# Patient Record
Sex: FEMALE | Race: WHITE | Employment: OTHER | URBAN - METROPOLITAN AREA
[De-identification: names, ages, dates, MRNs, and addresses within clinical notes are randomized per-mention and may not be internally consistent; named-entity substitution may affect disease eponyms.]

---

## 2017-11-16 ENCOUNTER — ANESTHESIA EVENT (OUTPATIENT)
Dept: SURGERY | Facility: CLINIC | Age: 82
End: 2017-11-16
Payer: MEDICARE

## 2017-11-16 ENCOUNTER — HOSPITAL ENCOUNTER (OUTPATIENT)
Facility: CLINIC | Age: 82
Discharge: HOME | End: 2017-11-16
Attending: OTOLARYNGOLOGY | Admitting: OTOLARYNGOLOGY
Payer: MEDICARE

## 2017-11-16 ENCOUNTER — ANESTHESIA (OUTPATIENT)
Dept: SURGERY | Facility: CLINIC | Age: 82
End: 2017-11-16
Payer: MEDICARE

## 2017-11-16 VITALS
BODY MASS INDEX: 20.4 KG/M2 | OXYGEN SATURATION: 95 % | DIASTOLIC BLOOD PRESSURE: 78 MMHG | WEIGHT: 130 LBS | TEMPERATURE: 98 F | RESPIRATION RATE: 16 BRPM | SYSTOLIC BLOOD PRESSURE: 128 MMHG | HEIGHT: 67 IN

## 2017-11-16 DIAGNOSIS — H72.01 TYMPANIC MEMBRANE CENTRAL PERFORATION, RIGHT: Primary | ICD-10-CM

## 2017-11-16 PROCEDURE — 27810325 ZZHC OR IMPLANT OTHER OPNP: Performed by: OTOLARYNGOLOGY

## 2017-11-16 PROCEDURE — 25000128 H RX IP 250 OP 636: Performed by: NURSE ANESTHETIST, CERTIFIED REGISTERED

## 2017-11-16 PROCEDURE — A9270 NON-COVERED ITEM OR SERVICE: HCPCS | Performed by: OTOLARYNGOLOGY

## 2017-11-16 PROCEDURE — 88304 TISSUE EXAM BY PATHOLOGIST: CPT | Performed by: OTOLARYNGOLOGY

## 2017-11-16 PROCEDURE — 27210794 ZZH OR GENERAL SUPPLY STERILE: Performed by: OTOLARYNGOLOGY

## 2017-11-16 PROCEDURE — 25000125 ZZHC RX 250: Performed by: OTOLARYNGOLOGY

## 2017-11-16 PROCEDURE — 25000128 H RX IP 250 OP 636: Performed by: OTOLARYNGOLOGY

## 2017-11-16 PROCEDURE — 37000008 ZZH ANESTHESIA TECHNICAL FEE, 1ST 30 MIN: Performed by: OTOLARYNGOLOGY

## 2017-11-16 PROCEDURE — 25000125 ZZHC RX 250: Performed by: NURSE ANESTHETIST, CERTIFIED REGISTERED

## 2017-11-16 PROCEDURE — 71000013 ZZH RECOVERY PHASE 1 LEVEL 1 EA ADDTL HR: Performed by: OTOLARYNGOLOGY

## 2017-11-16 PROCEDURE — 40000170 ZZH STATISTIC PRE-PROCEDURE ASSESSMENT II: Performed by: OTOLARYNGOLOGY

## 2017-11-16 PROCEDURE — 37000009 ZZH ANESTHESIA TECHNICAL FEE, EACH ADDTL 15 MIN: Performed by: OTOLARYNGOLOGY

## 2017-11-16 PROCEDURE — 25000566 ZZH SEVOFLURANE, EA 15 MIN: Performed by: OTOLARYNGOLOGY

## 2017-11-16 PROCEDURE — 71000027 ZZH RECOVERY PHASE 2 EACH 15 MINS: Performed by: OTOLARYNGOLOGY

## 2017-11-16 PROCEDURE — 88304 TISSUE EXAM BY PATHOLOGIST: CPT | Mod: 26 | Performed by: OTOLARYNGOLOGY

## 2017-11-16 PROCEDURE — 71000012 ZZH RECOVERY PHASE 1 LEVEL 1 FIRST HR: Performed by: OTOLARYNGOLOGY

## 2017-11-16 PROCEDURE — 25000128 H RX IP 250 OP 636: Performed by: ANESTHESIOLOGY

## 2017-11-16 PROCEDURE — 36000093 ZZH SURGERY LEVEL 4 1ST 30 MIN: Performed by: OTOLARYNGOLOGY

## 2017-11-16 PROCEDURE — 36000063 ZZH SURGERY LEVEL 4 EA 15 ADDTL MIN: Performed by: OTOLARYNGOLOGY

## 2017-11-16 RX ORDER — BACITRACIN ZINC 500 [USP'U]/G
OINTMENT TOPICAL PRN
Status: DISCONTINUED | OUTPATIENT
Start: 2017-11-16 | End: 2017-11-16 | Stop reason: HOSPADM

## 2017-11-16 RX ORDER — PROPOFOL 10 MG/ML
INJECTION, EMULSION INTRAVENOUS CONTINUOUS PRN
Status: DISCONTINUED | OUTPATIENT
Start: 2017-11-16 | End: 2017-11-16

## 2017-11-16 RX ORDER — HYDROMORPHONE HYDROCHLORIDE 1 MG/ML
.3-.5 INJECTION, SOLUTION INTRAMUSCULAR; INTRAVENOUS; SUBCUTANEOUS EVERY 10 MIN PRN
Status: DISCONTINUED | OUTPATIENT
Start: 2017-11-16 | End: 2017-11-16 | Stop reason: HOSPADM

## 2017-11-16 RX ORDER — NALOXONE HYDROCHLORIDE 0.4 MG/ML
.1-.4 INJECTION, SOLUTION INTRAMUSCULAR; INTRAVENOUS; SUBCUTANEOUS
Status: DISCONTINUED | OUTPATIENT
Start: 2017-11-16 | End: 2017-11-16 | Stop reason: HOSPADM

## 2017-11-16 RX ORDER — FENTANYL CITRATE 50 UG/ML
25-50 INJECTION, SOLUTION INTRAMUSCULAR; INTRAVENOUS
Status: DISCONTINUED | OUTPATIENT
Start: 2017-11-16 | End: 2017-11-16 | Stop reason: HOSPADM

## 2017-11-16 RX ORDER — OFLOXACIN 3 MG/ML
SOLUTION AURICULAR (OTIC) PRN
Status: DISCONTINUED | OUTPATIENT
Start: 2017-11-16 | End: 2017-11-16 | Stop reason: HOSPADM

## 2017-11-16 RX ORDER — CEPHALEXIN 500 MG/1
500 CAPSULE ORAL 2 TIMES DAILY
Qty: 10 CAPSULE | Refills: 0 | Status: SHIPPED | OUTPATIENT
Start: 2017-11-16 | End: 2017-11-21

## 2017-11-16 RX ORDER — HYDROCODONE BITARTRATE AND ACETAMINOPHEN 5; 325 MG/1; MG/1
.5-2 TABLET ORAL EVERY 4 HOURS PRN
Qty: 6 TABLET | Refills: 0 | Status: SHIPPED | OUTPATIENT
Start: 2017-11-16 | End: 2019-01-14

## 2017-11-16 RX ORDER — DEXAMETHASONE SODIUM PHOSPHATE 4 MG/ML
10 INJECTION, SOLUTION INTRA-ARTICULAR; INTRALESIONAL; INTRAMUSCULAR; INTRAVENOUS; SOFT TISSUE ONCE
Status: COMPLETED | OUTPATIENT
Start: 2017-11-16 | End: 2017-11-16

## 2017-11-16 RX ORDER — LIDOCAINE HYDROCHLORIDE AND EPINEPHRINE 10; 10 MG/ML; UG/ML
INJECTION, SOLUTION INFILTRATION; PERINEURAL
Status: DISCONTINUED
Start: 2017-11-16 | End: 2017-11-16 | Stop reason: HOSPADM

## 2017-11-16 RX ORDER — EPINEPHRINE NASAL SOLUTION 1 MG/ML
SOLUTION NASAL
Status: DISCONTINUED
Start: 2017-11-16 | End: 2017-11-16 | Stop reason: HOSPADM

## 2017-11-16 RX ORDER — LIDOCAINE HYDROCHLORIDE 20 MG/ML
INJECTION, SOLUTION INFILTRATION; PERINEURAL PRN
Status: DISCONTINUED | OUTPATIENT
Start: 2017-11-16 | End: 2017-11-16

## 2017-11-16 RX ORDER — CEFAZOLIN SODIUM 1 G/3ML
1 INJECTION, POWDER, FOR SOLUTION INTRAMUSCULAR; INTRAVENOUS SEE ADMIN INSTRUCTIONS
Status: DISCONTINUED | OUTPATIENT
Start: 2017-11-16 | End: 2017-11-16 | Stop reason: HOSPADM

## 2017-11-16 RX ORDER — ONDANSETRON 4 MG/1
4 TABLET, ORALLY DISINTEGRATING ORAL EVERY 30 MIN PRN
Status: DISCONTINUED | OUTPATIENT
Start: 2017-11-16 | End: 2017-11-16 | Stop reason: HOSPADM

## 2017-11-16 RX ORDER — LIDOCAINE HYDROCHLORIDE AND EPINEPHRINE 10; 10 MG/ML; UG/ML
INJECTION, SOLUTION INFILTRATION; PERINEURAL PRN
Status: DISCONTINUED | OUTPATIENT
Start: 2017-11-16 | End: 2017-11-16 | Stop reason: HOSPADM

## 2017-11-16 RX ORDER — PROPOFOL 10 MG/ML
INJECTION, EMULSION INTRAVENOUS PRN
Status: DISCONTINUED | OUTPATIENT
Start: 2017-11-16 | End: 2017-11-16

## 2017-11-16 RX ORDER — MEPERIDINE HYDROCHLORIDE 25 MG/ML
12.5 INJECTION INTRAMUSCULAR; INTRAVENOUS; SUBCUTANEOUS
Status: DISCONTINUED | OUTPATIENT
Start: 2017-11-16 | End: 2017-11-16 | Stop reason: HOSPADM

## 2017-11-16 RX ORDER — FENTANYL CITRATE 50 UG/ML
25-50 INJECTION, SOLUTION INTRAMUSCULAR; INTRAVENOUS EVERY 5 MIN PRN
Status: DISCONTINUED | OUTPATIENT
Start: 2017-11-16 | End: 2017-11-16 | Stop reason: HOSPADM

## 2017-11-16 RX ORDER — SODIUM CHLORIDE, SODIUM LACTATE, POTASSIUM CHLORIDE, CALCIUM CHLORIDE 600; 310; 30; 20 MG/100ML; MG/100ML; MG/100ML; MG/100ML
INJECTION, SOLUTION INTRAVENOUS CONTINUOUS PRN
Status: DISCONTINUED | OUTPATIENT
Start: 2017-11-16 | End: 2017-11-16

## 2017-11-16 RX ORDER — EPINEPHRINE NASAL SOLUTION 1 MG/ML
SOLUTION NASAL PRN
Status: DISCONTINUED | OUTPATIENT
Start: 2017-11-16 | End: 2017-11-16 | Stop reason: HOSPADM

## 2017-11-16 RX ORDER — GINSENG 100 MG
CAPSULE ORAL
Status: DISCONTINUED
Start: 2017-11-16 | End: 2017-11-16 | Stop reason: HOSPADM

## 2017-11-16 RX ORDER — HYDROCODONE BITARTRATE AND ACETAMINOPHEN 5; 325 MG/1; MG/1
1 TABLET ORAL
Status: DISCONTINUED | OUTPATIENT
Start: 2017-11-16 | End: 2017-11-16 | Stop reason: HOSPADM

## 2017-11-16 RX ORDER — FENTANYL CITRATE 50 UG/ML
INJECTION, SOLUTION INTRAMUSCULAR; INTRAVENOUS PRN
Status: DISCONTINUED | OUTPATIENT
Start: 2017-11-16 | End: 2017-11-16

## 2017-11-16 RX ORDER — ONDANSETRON 2 MG/ML
4 INJECTION INTRAMUSCULAR; INTRAVENOUS EVERY 30 MIN PRN
Status: DISCONTINUED | OUTPATIENT
Start: 2017-11-16 | End: 2017-11-16 | Stop reason: HOSPADM

## 2017-11-16 RX ORDER — EPHEDRINE SULFATE 50 MG/ML
INJECTION, SOLUTION INTRAMUSCULAR; INTRAVENOUS; SUBCUTANEOUS PRN
Status: DISCONTINUED | OUTPATIENT
Start: 2017-11-16 | End: 2017-11-16

## 2017-11-16 RX ORDER — PHYSOSTIGMINE SALICYLATE 1 MG/ML
1.2 INJECTION INTRAVENOUS
Status: DISCONTINUED | OUTPATIENT
Start: 2017-11-16 | End: 2017-11-16 | Stop reason: HOSPADM

## 2017-11-16 RX ORDER — SODIUM CHLORIDE, SODIUM LACTATE, POTASSIUM CHLORIDE, CALCIUM CHLORIDE 600; 310; 30; 20 MG/100ML; MG/100ML; MG/100ML; MG/100ML
INJECTION, SOLUTION INTRAVENOUS CONTINUOUS
Status: DISCONTINUED | OUTPATIENT
Start: 2017-11-16 | End: 2017-11-16 | Stop reason: HOSPADM

## 2017-11-16 RX ORDER — ONDANSETRON 2 MG/ML
INJECTION INTRAMUSCULAR; INTRAVENOUS PRN
Status: DISCONTINUED | OUTPATIENT
Start: 2017-11-16 | End: 2017-11-16

## 2017-11-16 RX ORDER — CEFAZOLIN SODIUM 2 G/100ML
2 INJECTION, SOLUTION INTRAVENOUS
Status: COMPLETED | OUTPATIENT
Start: 2017-11-16 | End: 2017-11-16

## 2017-11-16 RX ORDER — ACETAMINOPHEN 325 MG/1
650 TABLET ORAL
Status: DISCONTINUED | OUTPATIENT
Start: 2017-11-16 | End: 2017-11-16 | Stop reason: HOSPADM

## 2017-11-16 RX ORDER — MULTIPLE VITAMINS W/ MINERALS TAB 9MG-400MCG
1 TAB ORAL DAILY
Status: ON HOLD | COMMUNITY
End: 2020-01-01

## 2017-11-16 RX ADMIN — FENTANYL CITRATE 50 MCG: 50 INJECTION, SOLUTION INTRAMUSCULAR; INTRAVENOUS at 09:15

## 2017-11-16 RX ADMIN — Medication 5 MG: at 09:30

## 2017-11-16 RX ADMIN — SODIUM CHLORIDE, POTASSIUM CHLORIDE, SODIUM LACTATE AND CALCIUM CHLORIDE: 600; 310; 30; 20 INJECTION, SOLUTION INTRAVENOUS at 09:22

## 2017-11-16 RX ADMIN — Medication 5 MG: at 09:45

## 2017-11-16 RX ADMIN — Medication 10 MG: at 10:00

## 2017-11-16 RX ADMIN — PHENYLEPHRINE HYDROCHLORIDE 100 MCG: 10 INJECTION INTRAVENOUS at 10:30

## 2017-11-16 RX ADMIN — LIDOCAINE HYDROCHLORIDE 40 MG: 20 INJECTION, SOLUTION INFILTRATION; PERINEURAL at 09:15

## 2017-11-16 RX ADMIN — PROPOFOL 150 MG: 10 INJECTION, EMULSION INTRAVENOUS at 09:15

## 2017-11-16 RX ADMIN — PROPOFOL 50 MG: 10 INJECTION, EMULSION INTRAVENOUS at 09:45

## 2017-11-16 RX ADMIN — PHENYLEPHRINE HYDROCHLORIDE 50 MCG: 10 INJECTION INTRAVENOUS at 10:45

## 2017-11-16 RX ADMIN — PHENYLEPHRINE HYDROCHLORIDE 100 MCG: 10 INJECTION INTRAVENOUS at 11:00

## 2017-11-16 RX ADMIN — ONDANSETRON 4 MG: 2 INJECTION INTRAMUSCULAR; INTRAVENOUS at 11:15

## 2017-11-16 RX ADMIN — PROPOFOL 50 MG: 10 INJECTION, EMULSION INTRAVENOUS at 10:15

## 2017-11-16 RX ADMIN — FENTANYL CITRATE 50 MCG: 50 INJECTION, SOLUTION INTRAMUSCULAR; INTRAVENOUS at 09:45

## 2017-11-16 RX ADMIN — SODIUM CHLORIDE, POTASSIUM CHLORIDE, SODIUM LACTATE AND CALCIUM CHLORIDE: 600; 310; 30; 20 INJECTION, SOLUTION INTRAVENOUS at 12:37

## 2017-11-16 RX ADMIN — SODIUM CHLORIDE, POTASSIUM CHLORIDE, SODIUM LACTATE AND CALCIUM CHLORIDE: 600; 310; 30; 20 INJECTION, SOLUTION INTRAVENOUS at 10:00

## 2017-11-16 RX ADMIN — DEXAMETHASONE SODIUM PHOSPHATE 10 MG: 4 INJECTION, SOLUTION INTRA-ARTICULAR; INTRALESIONAL; INTRAMUSCULAR; INTRAVENOUS; SOFT TISSUE at 09:40

## 2017-11-16 RX ADMIN — PHENYLEPHRINE HYDROCHLORIDE 50 MCG: 10 INJECTION INTRAVENOUS at 09:45

## 2017-11-16 RX ADMIN — CEFAZOLIN SODIUM 2 G: 2 INJECTION, SOLUTION INTRAVENOUS at 09:32

## 2017-11-16 RX ADMIN — PHENYLEPHRINE HYDROCHLORIDE 50 MCG: 10 INJECTION INTRAVENOUS at 11:15

## 2017-11-16 RX ADMIN — PROPOFOL 100 MCG/KG/MIN: 10 INJECTION, EMULSION INTRAVENOUS at 09:15

## 2017-11-16 ASSESSMENT — LIFESTYLE VARIABLES: TOBACCO_USE: 1

## 2017-11-16 NOTE — IP AVS SNAPSHOT
[facility] Same Day Surgery    [address]    [place] [address]    Phone:  [phone]    Fax:  [phone]                                       After Visit Summary   11/16/2017    Lisa Damon    MRN: [id number]           After Visit Summary Signature Page     I have received my discharge instructions, and my questions have been answered. I have discussed any challenges I see with this plan with the nurse or doctor.    ..........................................................................................................................................  Patient/Patient Representative Signature      ..........................................................................................................................................  Patient Representative Print Name and Relationship to Patient    ..................................................               ................................................  Date                                            Time    ..........................................................................................................................................  Reviewed by Signature/Title    ...................................................              ..............................................  Date                                                            Time

## 2017-11-16 NOTE — IP AVS SNAPSHOT
MRN:[id number]                      After Visit Summary   11/16/2017    Lisa Damon    MRN: [id number]           Thank you!     Thank you for choosing [facility] for your care. Our goal is always to provide you with excellent care. Hearing back from our patients is one way we can continue to improve our services. Please take a few minutes to complete the written survey that you may receive in the mail after you visit with us. Thank you!        Patient Information     Date Of Birth          1935        About your hospital stay     You were admitted on:  November 16, 2017 You last received care in the:  [facility] Same Day Surgery    You were discharged on:  November 16, 2017       Who to Call     For medical emergencies, please call 911.  For non-urgent questions about your medical care, please call your primary care provider or clinic, [phone]  For questions related to your surgery, please call your surgery clinic        Attending Provider     Provider Specialty    Wan Suh MD Otolaryngology       Primary Care Provider Office Phone # Fax #    Pb Andrews [phone]      After Care Instructions     Diet Instructions       Resume pre procedure diet                  Further instructions from your care team       **If you have questions or concerns about your procedure,   call Dr. Suh at [phone]**            Same Day Surgery Discharge Instructions for  Sedation and General Anesthesia       It's not unusual to feel dizzy, light-headed or faint for up to 24 hours after surgery or while taking pain medication.  If you have these symptoms: sit for a few minutes before standing and have someone assist you when you get up to walk or use the bathroom.      You should rest and relax for the next 24 hours. We recommend you make arrangements to have an adult stay with you for at least 24 hours after your discharge.  Avoid hazardous and strenuous  activity.      DO NOT DRIVE any vehicle or operate mechanical equipment for 24 hours following the end of your surgery.  Even though you may feel normal, your reactions may be affected by the medication you have received.      Do not drink alcoholic beverages for 24 hours following surgery.       Slowly progress to your regular diet as you feel able. It's not unusual to feel nauseated and/or vomit after receiving anesthesia.  If you develop these symptoms, drink clear liquids (apple juice, ginger ale, broth, 7-up, etc. ) until you feel better.  If your nausea and vomiting persists for 24 hours, please notify your surgeon.        All narcotic pain medications, along with inactivity and anesthesia, can cause constipation. Drinking plenty of liquids and increasing fiber intake will help.      For any questions of a medical nature, call your surgeon.      Do not make important decisions for 24 hours.      If you had general anesthesia, you may have a sore throat for a couple of days related to the breathing tube used during surgery.  You may use Cepacol lozenges to help with this discomfort.  If it worsens or if you develop a fever, contact your surgeon.       If you feel your pain is not well managed with the pain medications prescribed by your surgeon, please contact your surgeon's office to let them know so they can address your concerns.       [facility] [place] [facility]  Ear Surgery Discharge Instructions  LY Shafer, EVELYN Summers, BEAU Melendrez    There are many different ear operations that are done for various conditions, however, the care after surgery is similar in almost all cases.    There is often some bloody drainage from the ear and this gradually decreases over a period of several days. A piece of cotton should be worn on the ear to soak up this drainage and the cotton changed as necessary. When there is no longer any drainage from the ear, it is not necessary to wear the  cotton.  There is sometimes a small gauze ribbon of packing in the ear canal and this will be removed in the office a week or two after surgery. You should be careful when changing the cotton in the ear so that the ribbon of packing in the ear does not stick to the cotton and inadvertently get pulled out when changing the cotton. If the packing starts to come out of the ear, simply cut off the piece that is hanging out.    With some ear operations there is an incision in the scalp over the ear with stitches. A couple of days after surgery it is permissible to get this area wet when showering or washing the hair since this will not hurt the incision or the stitches, but water should be kept out of the ear canal. This is best done by coating a piece of cotton with Vaseline and gently placing it into the opening of the ear canal.    It is wise to avoid airplane travel for a month after surgery and it is important not to blow your nose forcefully since both the altitude changes in airplanes and blowing the nose may force air up into the ear and move things in the area of the eardrum. Motion in this area may dislocate structures in the ear and result in hearing that is less than satisfactory. The ear may pop and crackle when you chew and swallow  and this is normal. It is just forcefully popping the ear that we try to avoid.    Following surgery, hearing usually is temporarily worse than it was prior to surgery. As the hearing starts to improve, it will frequently fluctuate from day to day. These fluctuations are to be expected and don't indicate anything is wrong with the ear.    There may be some dizziness associated with ear surgery and this is usually the kind of dizziness that is aggravated by moving the head. If the head is held still, the dizziness is usually minimized. When you go to sit in a chair or rise from a chair after sitting, try to keep the eyes on the level and the head fairly steady. If you need to  "pick something up from the floor, bend at the knees as though you had a stiff back and again try to keep the head level. Tipping the head to look up or down usually aggravates the dizziness.    You have been advised when you need to be seen next in the office. At your earliest opportunity, call the office for an appointment.    [facility]  [phone]                                   Pending Results     Date and Time Order Name Status Description    2017 1021 Surgical pathology exam In process             Admission Information     Date & Time Provider Department Dept. Phone    2017 Wan Suh MD [facility] Same Day Surgery [phone]      Your Vitals Were     Blood Pressure Temperature Respirations Height Weight Pulse Oximetry    130/65 98  F (36.7  C) (Temporal) 15 1.702 m (5' 7\") 59 kg (130 lb) 95%    BMI (Body Mass Index)                   20.36 kg/m2           MyChart Information     6connect lets you send messages to your doctor, view your test results, renew your prescriptions, schedule appointments and more. To sign up, go to [email or web address] . Click on \"Log in\" on the left side of the screen, which will take you to the Welcome page. Then click on \"Sign up Now\" on the right side of the page.     You will be asked to enter the access code listed below, as well as some personal information. Please follow the directions to create your username and password.     Your access code is: [email or web address]  Expires: 2018 12:57 PM     Your access code will  in 90 days. If you need help or a new code, please call your [facility] clinic or [phone].        Care EveryWhere ID     This is your Care EveryWhere ID. This could be used by other organizations to access your [facility] medical records  [id number]        Equal Access to Services     SHANIA BARRETO: David Sequeira, bianca dela cruz, marie urbina, hardik vergara " shanna ibarraaan ah. So [facility] [phone].    ATENCIÓN: Si osiella getachew, tiene a bedoya disposición servicios gratuitos de asistencia lingüística. Tom al [phone].    We comply with applicable federal civil rights laws and [place] laws. We do not discriminate on the basis of race, color, national origin, age, disability, sex, sexual orientation, or gender identity.               Review of your medicines      UNREVIEWED medicines. Ask your doctor about these medicines        Dose / Directions    ALEVE PO        Dose:  220 mg   Take 220 mg by mouth 2 times daily (with meals)   Refills:  0       calcium-vitamin D 500-125 MG-UNIT Tabs        Dose:  1 tablet   Take 1 tablet by mouth daily   Refills:  0       LEVOTHYROXINE SODIUM PO        Dose:  100 mcg   Take 100 mcg by mouth daily   Refills:  0       Multi-vitamin Tabs tablet        Dose:  1 tablet   Take 1 tablet by mouth daily   Refills:  0         START taking        Dose / Directions    cephALEXin 500 MG capsule   Commonly known as:  KEFLEX   Used for:  Tympanic membrane central perforation, right        Dose:  500 mg   Take 1 capsule (500 mg) by mouth 2 times daily for 5 days   Quantity:  10 capsule   Refills:  0       HYDROcodone-acetaminophen 5-325 MG per tablet   Commonly known as:  NORCO   Used for:  Tympanic membrane central perforation, right        Dose:  0.5-2 tablet   Take 0.5-2 tablets by mouth every 4 hours as needed for other (Moderate to Severe Pain)   Quantity:  6 tablet   Refills:  0            Where to get your medicines      These medications were sent to [facility] [place], [place] - [address]  [address] [facility] [address] [address]     Phone:  [phone]     cephALEXin 500 MG capsule         Some of these will need a paper prescription and others can be bought over the counter. Ask your nurse if you have questions.     Bring a paper prescription for each of these medications     HYDROcodone-acetaminophen 5-325 MG per  tablet               ANTIBIOTIC INSTRUCTION     You've Been Prescribed an Antibiotic - Now What?  Your healthcare team thinks that you or your loved one might have an infection. Some infections can be treated with antibiotics, which are powerful, life-saving drugs. Like all medications, antibiotics have side effects and should only be used when necessary. There are some important things you should know about your antibiotic treatment.      Your healthcare team may run tests before you start taking an antibiotic.    Your team may take samples (e.g., from your blood, urine or other areas) to run tests to look for bacteria. These test can be important to determine if you need an antibiotic at all and, if you do, which antibiotic will work best.      Within a few days, your healthcare team might change or even stop your antibiotic.    Your team may start you on an antibiotic while they are working to find out what is making you sick.    Your team might change your antibiotic because test results show that a different antibiotic would be better to treat your infection.    In some cases, once your team has more information, they learn that you do not need an antibiotic at all. They may find out that you don't have an infection, or that the antibiotic you're taking won't work against your infection. For example, an infection caused by a virus can't be treated with antibiotics. Staying on an antibiotic when you don't need it is more likely to be harmful than helpful.      You may experience side effects from your antibiotic.    Like all medications, antibiotics have side effects. Some of these can be serious.    Let you healthcare team know if you have any known allergies when you are admitted to the hospital.    One significant side effect of nearly all antibiotics is the risk of severe and sometimes deadly diarrhea caused by Clostridium difficile (C. Difficile). This occurs when a person takes antibiotics because some  good germs are destroyed. Antibiotic use allows C. diificile to take over, putting patients at high risk for this serious infection.    As a patient or caregiver, it is important to understand your or your loved one's antibiotic treatment. It is especially important for caregivers to speak up when patients can't speak for themselves. Here are some important questions to ask your healthcare team.    What infection is this antibiotic treating and how do you know I have that infection?    What side effects might occur from this antibiotic?    How long will I need to take this antibiotic?    Is it safe to take this antibiotic with other medications or supplements (e.g., vitamins) that I am taking?     Are there any special directions I need to know about taking this antibiotic? For example, should I take it with food?    How will I be monitored to know whether my infection is responding to the antibiotic?    What tests may help to make sure the right antibiotic is prescribed for me?      Information provided by:  www.cdc.gov/getsmart  U.S. Department of Health and Human Services  Centers for disease Control and Prevention  National Center for Emerging and Zoonotic Infectious Diseases  Division of Healthcare Quality Promotion         Protect others around you: Learn how to safely use, store and throw away your medicines at www.disposemymeds.org.             Medication List: This is a list of all your medications and when to take them. Check marks below indicate your daily home schedule. Keep this list as a reference.      Medications           Morning Afternoon Evening Bedtime As Needed    ALEVE PO   Take 220 mg by mouth 2 times daily (with meals)                                calcium-vitamin D 500-125 MG-UNIT Tabs   Take 1 tablet by mouth daily                                cephALEXin 500 MG capsule   Commonly known as:  KEFLEX   Take 1 capsule (500 mg) by mouth 2 times daily for 5 days                                 HYDROcodone-acetaminophen 5-325 MG per tablet   Commonly known as:  NORCO   Take 0.5-2 tablets by mouth every 4 hours as needed for other (Moderate to Severe Pain)                                LEVOTHYROXINE SODIUM PO   Take 100 mcg by mouth daily                                Multi-vitamin Tabs tablet   Take 1 tablet by mouth daily

## 2017-11-16 NOTE — DISCHARGE INSTRUCTIONS
**If you have questions or concerns about your procedure,   call Dr. Suh at [phone]**            Same Day Surgery Discharge Instructions for  Sedation and General Anesthesia       It's not unusual to feel dizzy, light-headed or faint for up to 24 hours after surgery or while taking pain medication.  If you have these symptoms: sit for a few minutes before standing and have someone assist you when you get up to walk or use the bathroom.      You should rest and relax for the next 24 hours. We recommend you make arrangements to have an adult stay with you for at least 24 hours after your discharge.  Avoid hazardous and strenuous activity.      DO NOT DRIVE any vehicle or operate mechanical equipment for 24 hours following the end of your surgery.  Even though you may feel normal, your reactions may be affected by the medication you have received.      Do not drink alcoholic beverages for 24 hours following surgery.       Slowly progress to your regular diet as you feel able. It's not unusual to feel nauseated and/or vomit after receiving anesthesia.  If you develop these symptoms, drink clear liquids (apple juice, ginger ale, broth, 7-up, etc. ) until you feel better.  If your nausea and vomiting persists for 24 hours, please notify your surgeon.        All narcotic pain medications, along with inactivity and anesthesia, can cause constipation. Drinking plenty of liquids and increasing fiber intake will help.      For any questions of a medical nature, call your surgeon.      Do not make important decisions for 24 hours.      If you had general anesthesia, you may have a sore throat for a couple of days related to the breathing tube used during surgery.  You may use Cepacol lozenges to help with this discomfort.  If it worsens or if you develop a fever, contact your surgeon.       If you feel your pain is not well managed with the pain medications prescribed by your surgeon, please contact your surgeon's  office to let them know so they can address your concerns.       [facility]  Ear Surgery Discharge Instructions  LY Shafer, EVELYN Summers, BEAU Melendrez    There are many different ear operations that are done for various conditions, however, the care after surgery is similar in almost all cases.    There is often some bloody drainage from the ear and this gradually decreases over a period of several days. A piece of cotton should be worn on the ear to soak up this drainage and the cotton changed as necessary. When there is no longer any drainage from the ear, it is not necessary to wear the cotton.  There is sometimes a small gauze ribbon of packing in the ear canal and this will be removed in the office a week or two after surgery. You should be careful when changing the cotton in the ear so that the ribbon of packing in the ear does not stick to the cotton and inadvertently get pulled out when changing the cotton. If the packing starts to come out of the ear, simply cut off the piece that is hanging out.    With some ear operations there is an incision in the scalp over the ear with stitches. A couple of days after surgery it is permissible to get this area wet when showering or washing the hair since this will not hurt the incision or the stitches, but water should be kept out of the ear canal. This is best done by coating a piece of cotton with Vaseline and gently placing it into the opening of the ear canal.    It is wise to avoid airplane travel for a month after surgery and it is important not to blow your nose forcefully since both the altitude changes in airplanes and blowing the nose may force air up into the ear and move things in the area of the eardrum. Motion in this area may dislocate structures in the ear and result in hearing that is less than satisfactory. The ear may pop and crackle when you chew and swallow  and this is normal. It is just forcefully  popping the ear that we try to avoid.    Following surgery, hearing usually is temporarily worse than it was prior to surgery. As the hearing starts to improve, it will frequently fluctuate from day to day. These fluctuations are to be expected and don't indicate anything is wrong with the ear.    There may be some dizziness associated with ear surgery and this is usually the kind of dizziness that is aggravated by moving the head. If the head is held still, the dizziness is usually minimized. When you go to sit in a chair or rise from a chair after sitting, try to keep the eyes on the level and the head fairly steady. If you need to pick something up from the floor, bend at the knees as though you had a stiff back and again try to keep the head level. Tipping the head to look up or down usually aggravates the dizziness.    You have been advised when you need to be seen next in the office. At your earliest opportunity, call the office for an appointment.    [facility] [phone]

## 2017-11-16 NOTE — ANESTHESIA POSTPROCEDURE EVALUATION
Patient: Lisa Damon    Procedure(s):  RIGHT TYMPANOPLASTY, REVISION PORP - Wound Class: II-Clean Contaminated    Diagnosis:RIGHT TYMPANIC MEMBRANE PERFORATION  Diagnosis Additional Information: No value filed.    Anesthesia Type:  General    Note:  Anesthesia Post Evaluation    Patient location during evaluation: PACU  Patient participation: Able to fully participate in evaluation  Level of consciousness: awake  Pain management: adequate  Airway patency: patent  Cardiovascular status: acceptable  Respiratory status: acceptable  Hydration status: acceptable  PONV: controlled     Anesthetic complications: None          Last vitals:  Vitals:    11/16/17 0745   BP: 143/60   Resp: 20   Temp: 36.4  C (97.6  F)   SpO2: 96%         Electronically Signed By: Trey Plummer MD  November 16, 2017  11:43 AM

## 2017-11-16 NOTE — OR NURSING
Ear dressing remains clean dry and intact.  PNDS met, po per I&O sheet. Pt dressed, up in recliner and transported to Phase 2.

## 2017-11-16 NOTE — ANESTHESIA CARE TRANSFER NOTE
Patient: Lisa Damon    Procedure(s):  RIGHT TYMPANOPLASTY, REVISION PORP - Wound Class: II-Clean Contaminated    Diagnosis: RIGHT TYMPANIC MEMBRANE PERFORATION  Diagnosis Additional Information: No value filed.    Anesthesia Type:   General     Note:  Airway :Face Mask  Patient transferred to:PACU  Comments: Transferred to [facility]  PACU  arousable breathing adequately by self  Report to Linda ALONSOHandoff Report: Identifed the Patient, Identified the Reponsible Provider, Reviewed the pertinent medical history, Discussed the surgical course, Reviewed Intra-OP anesthesia mangement and issues during anesthesia, Set expectations for post-procedure period and Allowed opportunity for questions and acknowledgement of understanding      Vitals: (Last set prior to Anesthesia Care Transfer)    CRNA VITALS  11/16/2017 1103 - 11/16/2017 1140      11/16/2017             Resp Rate (set): 10                Electronically Signed By: IVON Dahl CRNA  November 16, 2017  11:40 AM

## 2017-11-16 NOTE — ANESTHESIA PREPROCEDURE EVALUATION
Anesthesia Evaluation     . Pt has had prior anesthetic. Type: General           ROS/MED HX    ENT/Pulmonary:     (+)tobacco use, Past use , . .    Neurologic:       Cardiovascular:         METS/Exercise Tolerance:     Hematologic:         Musculoskeletal:   (+) arthritis, , , -       GI/Hepatic:         Renal/Genitourinary:         Endo:     (+) thyroid problem hypothyroidism, .      Psychiatric:         Infectious Disease:         Malignancy:         Other:                                    Anesthesia Plan      History & Physical Review  History and physical reviewed and following examination; no interval change.    ASA Status:  2 .        Plan for General with Intravenous induction. Maintenance will be TIVA.    PONV prophylaxis:  Ondansetron (or other 5HT-3) and Dexamethasone or Solumedrol  Propofol, Zofran, and decadron      Postoperative Care  Postoperative pain management:  IV analgesics and Oral pain medications.      Consents  Anesthetic plan, risks, benefits and alternatives discussed with:  Patient..                          .

## 2017-11-16 NOTE — BRIEF OP NOTE
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

## 2017-11-17 LAB — COPATH REPORT: NORMAL

## 2017-11-17 NOTE — OP NOTE
DATE OF SERVICE:  11/16/2017      PREOPERATIVE DIAGNOSES:   1.  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   2.  Conductive hearing loss.      POSTOPERATIVE DIAGNOSES:   1.  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   2.  Conductive hearing loss.      PROCEDURE:  Tympanoplasty without mastoidectomy, with ossicular chain reconstruction.      SURGEON:  Wan Suh MD      ANESTHESIA:  General.      ESTIMATED BLOOD LOSS:  3 mL.      SPECIMENS:  Right middle ear contents.      COMPLICATIONS:  None.      FINDINGS:   1.  Central perforation with associated cholesteatomatous debris at the perforation site.   2.  PORP extruding through perforation.   3.  Otherwise healthy-appearing middle ear contents without further evidence of more extensive cholesteatoma.      OPERATIVE INDICATIONS:  Lisa Damon is an 82-year-old female who in 2013 underwent surgery for a cholesteatoma, and had a partial ossicular reconstruction prosthesis placed at the time.  She had done well; however, in the past six months she has had recurrent plugging and developed a perforation, and suspicion for cholesteatoma recurrence was noted upon evaluation, and the decision was made to proceed with the above procedure.      OPERATIVE PROCEDURE:  The patient was met in preinduction with her daughters.  The risks, benefits and limitations were reviewed at length, including but not limited to bleeding, infection, recurrent perforation, further hearing loss, dizziness or tinnitus.  We discussed the anticipated postsurgical course and her questions were answered.  Consent was obtained.      From there, the patient was taken to the operating room.  She was placed in supine position.  General anesthetic was initiated.  The patient was endotracheally intubated and appropriate ventilation established.  The patient was prepped and draped for the above procedure.  1% lidocaine with 1:100,000 epinephrine was  infiltrated into the postauricular sulcus as well as into the tragus for planned graft.  Additional injection into the posterior external auditory canal skin was performed.  First, the external canal was inspected, and a canal incision was created, and the tympanomeatal flap was elevated anteriorly.  Portions of the tympanic membrane with perforation and abnormal epithelial debris were excised using a Robert needle, cupped forceps, and micro scissor.  A portion of this did appear cholesteatomatous in nature.  This was all removed and passed to the back table for histopathologic evaluation.  Next, the old PORP was identified and was removed.  The capitulum of the stapes appeared healthy and intact, and gentle palpation revealed good movement of the oval window.  Next, attention was turned to the postauricular sulcus where a postauricular incision was created.  Dissection was taken down to the level of the loose areolar temporalis fascia, and a graft was obtained.  It was pressed on the back table for later use.  Next, a small incision was made in the medial aspect of the skin of the tragus, and a portion of tragal cartilage was excised for backing the planned titanium prosthesis.  This was prepared by transecting the cartilage to the desired thickness and leaving perichondrium on one side.  The tragal cartilage site and the postauricular sites were closed with a resorbable suture.  Next, after complete inspection of the middle ear space did not suggest any further evidence of cholesteatoma, Gelfoam soaked in Floxin was placed anteriorly to support the drum.  The 2 mm sizing PORP was first used and noted to be somewhat on the longer side.  A decision to use a 1.5 mm centered titanium PORP was made.  This is what was used.  The 1.5 mm centered PORP was positioned over the capitulum of the stapes and supported with a minimal amount of Gelfoam.  The previously obtained cartilage graft was placed over the prosthesis to  back the titanium.  The fascia graft was positioned medial to the tympanic membrane and draped over the cartilage graft and onto the bony posterior external auditory canal.  The native tympanic membrane was placed medially over the tympanomeatal graft, and the external canal was then filled with bacitracin ointment.  Bacitracin was placed at the postauricular and tragal incision sites, and a Ping dressing was then positioned.  The procedure was then concluded.  There were no intraoperative complications.  The patient was then returned to the care of the Anesthesia Service.         MARCIN HARRELL MD             D: 2017 08:13   T: 2017 09:08   MT: CLEMENCIA#[id number]      Name:     CUONG GARCIA   MRN:      [id number]        Account:        [id number]   :      1935           Procedure Date: 2017      Document: [id number]       cc: Pb Andrews MD

## 2019-01-01 ENCOUNTER — DOCUMENTATION ONLY (OUTPATIENT)
Dept: CARDIOLOGY | Facility: CLINIC | Age: 84
End: 2019-01-01

## 2019-01-01 ENCOUNTER — OFFICE VISIT (OUTPATIENT)
Dept: CARDIOLOGY | Facility: CLINIC | Age: 84
End: 2019-01-01
Attending: INTERNAL MEDICINE
Payer: COMMERCIAL

## 2019-01-01 ENCOUNTER — TRANSFERRED RECORDS (OUTPATIENT)
Dept: HEALTH INFORMATION MANAGEMENT | Facility: CLINIC | Age: 84
End: 2019-01-01

## 2019-01-01 ENCOUNTER — OFFICE VISIT (OUTPATIENT)
Dept: CARDIOLOGY | Facility: CLINIC | Age: 84
End: 2019-01-01
Payer: COMMERCIAL

## 2019-01-01 VITALS
BODY MASS INDEX: 18.16 KG/M2 | SYSTOLIC BLOOD PRESSURE: 136 MMHG | HEIGHT: 68 IN | WEIGHT: 119.8 LBS | HEART RATE: 71 BPM | DIASTOLIC BLOOD PRESSURE: 76 MMHG

## 2019-01-01 VITALS
BODY MASS INDEX: 18.44 KG/M2 | SYSTOLIC BLOOD PRESSURE: 130 MMHG | HEIGHT: 68 IN | WEIGHT: 121.7 LBS | OXYGEN SATURATION: 99 % | DIASTOLIC BLOOD PRESSURE: 64 MMHG | HEART RATE: 80 BPM

## 2019-01-01 DIAGNOSIS — I47.29 NON-SUSTAINED VENTRICULAR TACHYCARDIA (H): Primary | ICD-10-CM

## 2019-01-01 DIAGNOSIS — E03.9 ACQUIRED HYPOTHYROIDISM: ICD-10-CM

## 2019-01-01 DIAGNOSIS — I34.1 MITRAL VALVE PROLAPSE: ICD-10-CM

## 2019-01-01 DIAGNOSIS — G45.9 TIA (TRANSIENT ISCHEMIC ATTACK): ICD-10-CM

## 2019-01-01 PROCEDURE — 93000 ELECTROCARDIOGRAM COMPLETE: CPT | Performed by: INTERNAL MEDICINE

## 2019-01-01 PROCEDURE — 99214 OFFICE O/P EST MOD 30 MIN: CPT | Performed by: INTERNAL MEDICINE

## 2019-01-01 PROCEDURE — 99203 OFFICE O/P NEW LOW 30 MIN: CPT | Performed by: INTERNAL MEDICINE

## 2019-01-01 RX ORDER — LEVOTHYROXINE SODIUM 75 UG/1
75 TABLET ORAL DAILY
Status: ON HOLD | COMMUNITY
End: 2020-01-01

## 2019-01-01 ASSESSMENT — MIFFLIN-ST. JEOR
SCORE: 1051.91
SCORE: 1060.53

## 2019-01-14 ENCOUNTER — APPOINTMENT (OUTPATIENT)
Dept: MRI IMAGING | Facility: CLINIC | Age: 84
End: 2019-01-14
Payer: COMMERCIAL

## 2019-01-14 ENCOUNTER — HOSPITAL ENCOUNTER (OUTPATIENT)
Facility: CLINIC | Age: 84
Setting detail: OBSERVATION
Discharge: HOME | End: 2019-01-15
Attending: EMERGENCY MEDICINE | Admitting: HOSPITALIST
Payer: COMMERCIAL

## 2019-01-14 ENCOUNTER — APPOINTMENT (OUTPATIENT)
Dept: CT IMAGING | Facility: CLINIC | Age: 84
End: 2019-01-14
Payer: COMMERCIAL

## 2019-01-14 DIAGNOSIS — G45.9 TIA (TRANSIENT ISCHEMIC ATTACK): Primary | ICD-10-CM

## 2019-01-14 DIAGNOSIS — H53.9 VISUAL DISTURBANCE: ICD-10-CM

## 2019-01-14 DIAGNOSIS — E86.0 DEHYDRATION: ICD-10-CM

## 2019-01-14 DIAGNOSIS — R41.82 ALTERED MENTAL STATUS, UNSPECIFIED ALTERED MENTAL STATUS TYPE: ICD-10-CM

## 2019-01-14 DIAGNOSIS — E03.9 HYPOTHYROIDISM, UNSPECIFIED TYPE: ICD-10-CM

## 2019-01-14 LAB
ALBUMIN SERPL-MCNC: 3.4 G/DL (ref 3.4–5)
ALBUMIN SERPL-MCNC: 3.5 G/DL (ref 3.4–5)
ALBUMIN UR-MCNC: NEGATIVE MG/DL
ALP SERPL-CCNC: 72 U/L (ref 40–150)
ALP SERPL-CCNC: 77 U/L (ref 40–150)
ALT SERPL W P-5'-P-CCNC: 17 U/L (ref 0–50)
ALT SERPL W P-5'-P-CCNC: 18 U/L (ref 0–50)
ANION GAP SERPL CALCULATED.3IONS-SCNC: 8 MMOL/L (ref 3–14)
ANION GAP SERPL CALCULATED.3IONS-SCNC: 9 MMOL/L (ref 3–14)
APPEARANCE UR: CLEAR
AST SERPL W P-5'-P-CCNC: 17 U/L (ref 0–45)
AST SERPL W P-5'-P-CCNC: 19 U/L (ref 0–45)
BASOPHILS # BLD AUTO: 0 10E9/L (ref 0–0.2)
BASOPHILS NFR BLD AUTO: 0.5 %
BILIRUB DIRECT SERPL-MCNC: 0.1 MG/DL (ref 0–0.2)
BILIRUB SERPL-MCNC: 0.4 MG/DL (ref 0.2–1.3)
BILIRUB SERPL-MCNC: 0.4 MG/DL (ref 0.2–1.3)
BILIRUB UR QL STRIP: NEGATIVE
BUN SERPL-MCNC: 15 MG/DL (ref 7–30)
BUN SERPL-MCNC: 19 MG/DL (ref 7–30)
CALCIUM SERPL-MCNC: 8.9 MG/DL (ref 8.5–10.1)
CALCIUM SERPL-MCNC: 9.2 MG/DL (ref 8.5–10.1)
CHLORIDE SERPL-SCNC: 102 MMOL/L (ref 94–109)
CHLORIDE SERPL-SCNC: 103 MMOL/L (ref 94–109)
CHOLEST SERPL-MCNC: 195 MG/DL
CO2 SERPL-SCNC: 24 MMOL/L (ref 20–32)
CO2 SERPL-SCNC: 27 MMOL/L (ref 20–32)
COLOR UR AUTO: NORMAL
CREAT SERPL-MCNC: 0.79 MG/DL (ref 0.52–1.04)
CREAT SERPL-MCNC: 0.8 MG/DL (ref 0.52–1.04)
CRP SERPL-MCNC: <2.9 MG/L (ref 0–8)
DEPRECATED S PYO AG THROAT QL EIA: NORMAL
DIFFERENTIAL METHOD BLD: NORMAL
EOSINOPHIL # BLD AUTO: 0.1 10E9/L (ref 0–0.7)
EOSINOPHIL NFR BLD AUTO: 1.1 %
ERYTHROCYTE [DISTWIDTH] IN BLOOD BY AUTOMATED COUNT: 12.9 % (ref 10–15)
ERYTHROCYTE [DISTWIDTH] IN BLOOD BY AUTOMATED COUNT: 13 % (ref 10–15)
ERYTHROCYTE [SEDIMENTATION RATE] IN BLOOD BY WESTERGREN METHOD: 16 MM/H (ref 0–30)
GFR SERPL CREATININE-BSD FRML MDRD: 69 ML/MIN/{1.73_M2}
GFR SERPL CREATININE-BSD FRML MDRD: 69 ML/MIN/{1.73_M2}
GLUCOSE SERPL-MCNC: 114 MG/DL (ref 70–99)
GLUCOSE SERPL-MCNC: 85 MG/DL (ref 70–99)
GLUCOSE UR STRIP-MCNC: NEGATIVE MG/DL
HBA1C MFR BLD: 5.4 % (ref 0–5.6)
HCT VFR BLD AUTO: 34.3 % (ref 35–47)
HCT VFR BLD AUTO: 35.1 % (ref 35–47)
HDLC SERPL-MCNC: 104 MG/DL
HGB BLD-MCNC: 11.8 G/DL (ref 11.7–15.7)
HGB BLD-MCNC: 12 G/DL (ref 11.7–15.7)
HGB UR QL STRIP: NEGATIVE
IMM GRANULOCYTES # BLD: 0 10E9/L (ref 0–0.4)
IMM GRANULOCYTES NFR BLD: 0.3 %
INTERPRETATION ECG - MUSE: NORMAL
KETONES UR STRIP-MCNC: NEGATIVE MG/DL
LDLC SERPL CALC-MCNC: 79 MG/DL
LEUKOCYTE ESTERASE UR QL STRIP: NEGATIVE
LYMPHOCYTES # BLD AUTO: 1.2 10E9/L (ref 0.8–5.3)
LYMPHOCYTES NFR BLD AUTO: 19.5 %
MAGNESIUM SERPL-MCNC: 2.1 MG/DL (ref 1.6–2.3)
MCH RBC QN AUTO: 30.5 PG (ref 26.5–33)
MCH RBC QN AUTO: 30.9 PG (ref 26.5–33)
MCHC RBC AUTO-ENTMCNC: 34.2 G/DL (ref 31.5–36.5)
MCHC RBC AUTO-ENTMCNC: 34.4 G/DL (ref 31.5–36.5)
MCV RBC AUTO: 89 FL (ref 78–100)
MCV RBC AUTO: 90 FL (ref 78–100)
MONOCYTES # BLD AUTO: 0.5 10E9/L (ref 0–1.3)
MONOCYTES NFR BLD AUTO: 7.9 %
NEUTROPHILS # BLD AUTO: 4.5 10E9/L (ref 1.6–8.3)
NEUTROPHILS NFR BLD AUTO: 70.7 %
NITRATE UR QL: NEGATIVE
NONHDLC SERPL-MCNC: 91 MG/DL
NRBC # BLD AUTO: 0 10*3/UL
NRBC BLD AUTO-RTO: 0 /100
PH UR STRIP: 7 PH (ref 5–7)
PHOSPHATE SERPL-MCNC: 3.3 MG/DL (ref 2.5–4.5)
PLATELET # BLD AUTO: 324 10E9/L (ref 150–450)
PLATELET # BLD AUTO: 335 10E9/L (ref 150–450)
POTASSIUM SERPL-SCNC: 4.3 MMOL/L (ref 3.4–5.3)
POTASSIUM SERPL-SCNC: 4.3 MMOL/L (ref 3.4–5.3)
PROT SERPL-MCNC: 6.4 G/DL (ref 6.8–8.8)
PROT SERPL-MCNC: 6.8 G/DL (ref 6.8–8.8)
RBC # BLD AUTO: 3.82 10E12/L (ref 3.8–5.2)
RBC # BLD AUTO: 3.93 10E12/L (ref 3.8–5.2)
SODIUM SERPL-SCNC: 136 MMOL/L (ref 133–144)
SODIUM SERPL-SCNC: 137 MMOL/L (ref 133–144)
SOURCE: NORMAL
SP GR UR STRIP: 1.01 (ref 1–1.03)
SPECIMEN SOURCE: NORMAL
T4 FREE SERPL-MCNC: 1.72 NG/DL (ref 0.76–1.46)
TRIGL SERPL-MCNC: 59 MG/DL
TROPONIN I SERPL-MCNC: <0.015 UG/L (ref 0–0.04)
TROPONIN I SERPL-MCNC: <0.015 UG/L (ref 0–0.04)
TSH SERPL DL<=0.005 MIU/L-ACNC: 0.18 MU/L (ref 0.4–4)
UROBILINOGEN UR STRIP-MCNC: NORMAL MG/DL (ref 0–2)
WBC # BLD AUTO: 6.4 10E9/L (ref 4–11)
WBC # BLD AUTO: 8.1 10E9/L (ref 4–11)

## 2019-01-14 PROCEDURE — 85027 COMPLETE CBC AUTOMATED: CPT | Mod: 59 | Performed by: PHYSICIAN ASSISTANT

## 2019-01-14 PROCEDURE — 70450 CT HEAD/BRAIN W/O DYE: CPT

## 2019-01-14 PROCEDURE — 86140 C-REACTIVE PROTEIN: CPT | Performed by: PHYSICIAN ASSISTANT

## 2019-01-14 PROCEDURE — 87880 STREP A ASSAY W/OPTIC: CPT | Performed by: EMERGENCY MEDICINE

## 2019-01-14 PROCEDURE — G0378 HOSPITAL OBSERVATION PER HR: HCPCS

## 2019-01-14 PROCEDURE — 25000128 H RX IP 250 OP 636: Performed by: EMERGENCY MEDICINE

## 2019-01-14 PROCEDURE — 70553 MRI BRAIN STEM W/O & W/DYE: CPT

## 2019-01-14 PROCEDURE — 99220 ZZC INITIAL OBSERVATION CARE,LEVL III: CPT | Performed by: PHYSICIAN ASSISTANT

## 2019-01-14 PROCEDURE — 84484 ASSAY OF TROPONIN QUANT: CPT | Performed by: EMERGENCY MEDICINE

## 2019-01-14 PROCEDURE — 84100 ASSAY OF PHOSPHORUS: CPT | Performed by: EMERGENCY MEDICINE

## 2019-01-14 PROCEDURE — 96360 HYDRATION IV INFUSION INIT: CPT

## 2019-01-14 PROCEDURE — 25500064 ZZH RX 255 OP 636: Performed by: HOSPITALIST

## 2019-01-14 PROCEDURE — A9585 GADOBUTROL INJECTION: HCPCS | Performed by: HOSPITALIST

## 2019-01-14 PROCEDURE — 81003 URINALYSIS AUTO W/O SCOPE: CPT | Performed by: EMERGENCY MEDICINE

## 2019-01-14 PROCEDURE — 25000128 H RX IP 250 OP 636: Performed by: PHYSICIAN ASSISTANT

## 2019-01-14 PROCEDURE — 25800025 ZZH RX 258: Performed by: HOSPITALIST

## 2019-01-14 PROCEDURE — 84443 ASSAY THYROID STIM HORMONE: CPT | Performed by: EMERGENCY MEDICINE

## 2019-01-14 PROCEDURE — 70544 MR ANGIOGRAPHY HEAD W/O DYE: CPT | Mod: XS

## 2019-01-14 PROCEDURE — 96361 HYDRATE IV INFUSION ADD-ON: CPT

## 2019-01-14 PROCEDURE — 36415 COLL VENOUS BLD VENIPUNCTURE: CPT | Performed by: PHYSICIAN ASSISTANT

## 2019-01-14 PROCEDURE — 99214 OFFICE O/P EST MOD 30 MIN: CPT | Performed by: PSYCHIATRY & NEUROLOGY

## 2019-01-14 PROCEDURE — 25000132 ZZH RX MED GY IP 250 OP 250 PS 637: Performed by: PHYSICIAN ASSISTANT

## 2019-01-14 PROCEDURE — 80061 LIPID PANEL: CPT | Performed by: PHYSICIAN ASSISTANT

## 2019-01-14 PROCEDURE — 84484 ASSAY OF TROPONIN QUANT: CPT | Performed by: PHYSICIAN ASSISTANT

## 2019-01-14 PROCEDURE — 80053 COMPREHEN METABOLIC PANEL: CPT | Performed by: EMERGENCY MEDICINE

## 2019-01-14 PROCEDURE — 84439 ASSAY OF FREE THYROXINE: CPT | Performed by: EMERGENCY MEDICINE

## 2019-01-14 PROCEDURE — 87081 CULTURE SCREEN ONLY: CPT | Performed by: EMERGENCY MEDICINE

## 2019-01-14 PROCEDURE — 85025 COMPLETE CBC W/AUTO DIFF WBC: CPT | Performed by: EMERGENCY MEDICINE

## 2019-01-14 PROCEDURE — 93005 ELECTROCARDIOGRAM TRACING: CPT

## 2019-01-14 PROCEDURE — 70547 MR ANGIOGRAPHY NECK W/O DYE: CPT

## 2019-01-14 PROCEDURE — 25000128 H RX IP 250 OP 636

## 2019-01-14 PROCEDURE — 80048 BASIC METABOLIC PNL TOTAL CA: CPT | Performed by: PHYSICIAN ASSISTANT

## 2019-01-14 PROCEDURE — 85652 RBC SED RATE AUTOMATED: CPT | Performed by: PHYSICIAN ASSISTANT

## 2019-01-14 PROCEDURE — 83036 HEMOGLOBIN GLYCOSYLATED A1C: CPT | Performed by: PHYSICIAN ASSISTANT

## 2019-01-14 PROCEDURE — 99285 EMERGENCY DEPT VISIT HI MDM: CPT | Mod: 25

## 2019-01-14 PROCEDURE — 80076 HEPATIC FUNCTION PANEL: CPT | Performed by: PHYSICIAN ASSISTANT

## 2019-01-14 PROCEDURE — 83735 ASSAY OF MAGNESIUM: CPT | Performed by: EMERGENCY MEDICINE

## 2019-01-14 RX ORDER — PROCHLORPERAZINE 25 MG
12.5 SUPPOSITORY, RECTAL RECTAL EVERY 12 HOURS PRN
Status: DISCONTINUED | OUTPATIENT
Start: 2019-01-14 | End: 2019-01-15 | Stop reason: HOSPADM

## 2019-01-14 RX ORDER — ONDANSETRON 4 MG/1
4 TABLET, ORALLY DISINTEGRATING ORAL EVERY 6 HOURS PRN
Status: DISCONTINUED | OUTPATIENT
Start: 2019-01-14 | End: 2019-01-15 | Stop reason: HOSPADM

## 2019-01-14 RX ORDER — AMOXICILLIN 250 MG
2 CAPSULE ORAL 2 TIMES DAILY PRN
Status: DISCONTINUED | OUTPATIENT
Start: 2019-01-14 | End: 2019-01-15 | Stop reason: HOSPADM

## 2019-01-14 RX ORDER — ATORVASTATIN CALCIUM 40 MG/1
40 TABLET, FILM COATED ORAL
Status: DISCONTINUED | OUTPATIENT
Start: 2019-01-15 | End: 2019-01-15

## 2019-01-14 RX ORDER — SODIUM CHLORIDE 9 MG/ML
INJECTION, SOLUTION INTRAVENOUS CONTINUOUS
Status: DISCONTINUED | OUTPATIENT
Start: 2019-01-14 | End: 2019-01-15 | Stop reason: HOSPADM

## 2019-01-14 RX ORDER — ONDANSETRON 2 MG/ML
4 INJECTION INTRAMUSCULAR; INTRAVENOUS EVERY 6 HOURS PRN
Status: DISCONTINUED | OUTPATIENT
Start: 2019-01-14 | End: 2019-01-15 | Stop reason: HOSPADM

## 2019-01-14 RX ORDER — BISACODYL 10 MG
10 SUPPOSITORY, RECTAL RECTAL DAILY PRN
Status: DISCONTINUED | OUTPATIENT
Start: 2019-01-14 | End: 2019-01-15 | Stop reason: HOSPADM

## 2019-01-14 RX ORDER — ASPIRIN 300 MG/1
300 SUPPOSITORY RECTAL DAILY
Status: DISCONTINUED | OUTPATIENT
Start: 2019-01-14 | End: 2019-01-15

## 2019-01-14 RX ORDER — ACYCLOVIR 200 MG/1
60 CAPSULE ORAL ONCE
Status: COMPLETED | OUTPATIENT
Start: 2019-01-14 | End: 2019-01-14

## 2019-01-14 RX ORDER — NALOXONE HYDROCHLORIDE 0.4 MG/ML
.1-.4 INJECTION, SOLUTION INTRAMUSCULAR; INTRAVENOUS; SUBCUTANEOUS
Status: DISCONTINUED | OUTPATIENT
Start: 2019-01-14 | End: 2019-01-15 | Stop reason: HOSPADM

## 2019-01-14 RX ORDER — AMOXICILLIN 250 MG
1 CAPSULE ORAL 2 TIMES DAILY PRN
Status: DISCONTINUED | OUTPATIENT
Start: 2019-01-14 | End: 2019-01-15 | Stop reason: HOSPADM

## 2019-01-14 RX ORDER — PROCHLORPERAZINE MALEATE 5 MG
5 TABLET ORAL EVERY 6 HOURS PRN
Status: DISCONTINUED | OUTPATIENT
Start: 2019-01-14 | End: 2019-01-15 | Stop reason: HOSPADM

## 2019-01-14 RX ORDER — POLYETHYLENE GLYCOL 3350 17 G/17G
17 POWDER, FOR SOLUTION ORAL DAILY PRN
Status: DISCONTINUED | OUTPATIENT
Start: 2019-01-14 | End: 2019-01-15 | Stop reason: HOSPADM

## 2019-01-14 RX ORDER — LIDOCAINE 40 MG/G
CREAM TOPICAL
Status: DISCONTINUED | OUTPATIENT
Start: 2019-01-14 | End: 2019-01-15 | Stop reason: HOSPADM

## 2019-01-14 RX ORDER — ACETAMINOPHEN 650 MG/1
650 SUPPOSITORY RECTAL EVERY 4 HOURS PRN
Status: DISCONTINUED | OUTPATIENT
Start: 2019-01-14 | End: 2019-01-15 | Stop reason: HOSPADM

## 2019-01-14 RX ORDER — LEVOTHYROXINE SODIUM 100 UG/1
100 TABLET ORAL DAILY
Status: ON HOLD | COMMUNITY
End: 2019-01-15

## 2019-01-14 RX ORDER — GADOBUTROL 604.72 MG/ML
10 INJECTION INTRAVENOUS ONCE
Status: COMPLETED | OUTPATIENT
Start: 2019-01-14 | End: 2019-01-14

## 2019-01-14 RX ORDER — ACETAMINOPHEN 325 MG/1
650 TABLET ORAL EVERY 4 HOURS PRN
Status: DISCONTINUED | OUTPATIENT
Start: 2019-01-14 | End: 2019-01-15 | Stop reason: HOSPADM

## 2019-01-14 RX ORDER — ONDANSETRON 2 MG/ML
INJECTION INTRAMUSCULAR; INTRAVENOUS
Status: COMPLETED
Start: 2019-01-14 | End: 2019-01-14

## 2019-01-14 RX ADMIN — ASPIRIN 325 MG: 325 TABLET, DELAYED RELEASE ORAL at 16:12

## 2019-01-14 RX ADMIN — GADOBUTROL 10 ML: 604.72 INJECTION INTRAVENOUS at 21:25

## 2019-01-14 RX ADMIN — SODIUM CHLORIDE, POTASSIUM CHLORIDE, SODIUM LACTATE AND CALCIUM CHLORIDE 1000 ML: 600; 310; 30; 20 INJECTION, SOLUTION INTRAVENOUS at 08:41

## 2019-01-14 RX ADMIN — SODIUM CHLORIDE 30 ML: 9 INJECTION, SOLUTION INTRAMUSCULAR; INTRAVENOUS; SUBCUTANEOUS at 21:25

## 2019-01-14 RX ADMIN — ONDANSETRON 4 MG: 2 INJECTION INTRAMUSCULAR; INTRAVENOUS at 09:42

## 2019-01-14 RX ADMIN — SODIUM CHLORIDE: 9 INJECTION, SOLUTION INTRAVENOUS at 16:04

## 2019-01-14 ASSESSMENT — ENCOUNTER SYMPTOMS
HEADACHES: 1
SORE THROAT: 1
SHORTNESS OF BREATH: 0
CONFUSION: 1
ABDOMINAL PAIN: 0
WEAKNESS: 0
DIZZINESS: 1
FEVER: 0

## 2019-01-14 ASSESSMENT — MIFFLIN-ST. JEOR: SCORE: 1175.29

## 2019-01-14 NOTE — PHARMACY-ADMISSION MEDICATION HISTORY
Admission medication history interview status for the 1/14/2019  admission is complete. See EPIC admission navigator for prior to admission medications     Medication history source reliability:Good- patient, family, [employer]    Actions taken by pharmacist (provider contacted, etc): Called [facility] pharmacy to verify levothyroxine strength     Additional medication history information not noted on PTA med list :  -She reports taking Aleve pretty regularly but not every day    Medication reconciliation/reorder completed by provider prior to medication history? Yes    Time spent in this activity: 10 min    Prior to Admission medications    Medication Sig Last Dose Taking? Auth Provider   calcium-vitamin D 500-125 MG-UNIT TABS Take 1 tablet by mouth daily  Past Week at Unknown time Yes Reported, Patient   levothyroxine (SYNTHROID/LEVOTHROID) 100 MCG tablet Take 100 mcg by mouth daily Past Week at Unknown time Yes Unknown, Entered By History   multivitamin, therapeutic with minerals (MULTI-VITAMIN) TABS tablet Take 1 tablet by mouth daily Past Week at Unknown time Yes Reported, Patient   Naproxen Sodium (ALEVE PO) Take 1 tablet by mouth 2 times daily as needed  Past Week at Unknown time Yes Reported, Patient

## 2019-01-14 NOTE — PROGRESS NOTES
RECEIVING UNIT ED HANDOFF REVIEW    ED Nurse Handoff Report was reviewed by: Juju Brandon on January 14, 2019 at 2:43 PM

## 2019-01-14 NOTE — PROGRESS NOTES
PRIMARY DIAGNOSIS: TIA  OUTPATIENT/OBSERVATION GOALS TO BE MET BEFORE DISCHARGE:  1. Orthostatic performed: N/A    2. Diagnostic testing complete & at baseline neurologic testing: No    3. Cleared by consultants (if involved): No    4. Interpretation of cardiac rhythm per telemetry tech: NSR    5. Tolerating adequate PO diet and medications: Yes    6. Return to near baseline physical activity or neurologic status: No    Discharge Planner Nurse   Safe discharge environment identified: No  Barriers to discharge: Yes       Entered by: Nicole Potter 01/14/2019 5:26 PM     Please review provider order for any additional goals.   Nurse to notify provider when observation goals have been met and patient is ready for discharge.

## 2019-01-14 NOTE — CONSULTS
[facility]    Vascular Neurology Consult    Name: Lisa Damon  YOB: 1935  MRN: [id number]  Today's date: 1/14/2019  Source of information: Patient and chart review    Reason for consult:  TIA    Chief Complaint:  Bilateral blurry vision, confusion and left sided weakness    History of Present Illness:    Lisa Damon is a 83 year old female who lives at home by herself, independent with all ADLs, ambulates without assistance and drives with a PMH significant for hypothyroidism, peripheral neuropathy, raynauds syndrome, scoliosis and cholesteatoma excision who was brought to [facility] for further evaluation of episode of confusion blurred vision and headache.      History is obtained by patient and patient's daughters at bedside.  She spoke to 1 of her daughters yesterday and was in her usual state of health.  When she woke up this morning, she was unable to get out of bed, she had increased pressure in both her eyes with blurry vision and was dizzy.  She tried to call her daughter through her cell phone and had difficulty dialing the number and was eventually able to call by looking at the recent call list.  When her daughter came to visit her, she was noted to be more confused and had weakness in her left upper extremity but was able to maintain antigravity strength.  She was then brought to the ER for further evaluation and her symptoms are largely resolved by this time.    In the ER, initial labs are grossly unremarkable.  CT head without contrast did not show any acute intracranial abnormality, EKG showed a sinus rhythm.  She was then admitted for further observation and workup of TIA.  Patient does not take aspirin at home, denies any prior history of stroke or TIAs.  No recent fevers chills no chest pain or shortness of breath, no jaw claudication, no vision loss or visual field cut, no gait abnormalities.  Patient states that  this episode of left-sided weakness last a couple hours and completely resolved.    The patient's medical, surgical, social, and family history were personally reviewed with the patient.  Past Medical History:   Diagnosis Date     Arthritis      Cholesteatoma      Peripheral neuropathy      Raynaud's syndrome      Scoliosis (and kyphoscoliosis), idiopathic      Unspecified hypothyroidism       Past Surgical History:   Procedure Laterality Date     BACK SURGERY      L4-5 FUSION     COLONOSCOPY N/A 10/6/2016    Procedure: COLONOSCOPY;  Surgeon: Cal Timmons MD;  Location:  GI     EXAM UNDER ANESTHESIA EAR(S) Right 2015    Procedure: EXAM UNDER ANESTHESIA EAR(S);  Surgeon: Wan Suh MD;  Location: [facility]     GYN SURGERY      HYSTERECTOMY     L4-5      DISKECTOMY     ORTHOPEDIC SURGERY      BILATERAL SHOULDER ARTHOPLASTY     TONSILLECTOMY       TYMPANOPLASTY Right 2015    Procedure: TYMPANOPLASTY;  Surgeon: Wan Suh MD;  Location: [facility]     TYMPANOPLASTY Right 2017    Procedure: TYMPANOPLASTY;  RIGHT TYMPANOPLASTY, REVISION PORP;  Surgeon: Wan Suh MD;  Location: [facility]     Social History     Socioeconomic History     Marital status: Single     Spouse name: Not on file     Number of children: Not on file     Years of education: Not on file     Highest education level: Not on file   Social Needs     Financial resource strain: Not on file     Food insecurity - worry: Not on file     Food insecurity - inability: Not on file     Transportation needs - medical: Not on file     Transportation needs - non-medical: Not on file   Occupational History     Not on file   Tobacco Use     Smoking status: Former Smoker     Types: Cigarettes     Last attempt to quit: 1965     Years since quittin.0     Smokeless tobacco: Never Used   Substance and Sexual Activity     Alcohol use: No     Drug use: No     Sexual activity: Not on file   Other Topics Concern     Parent/sibling w/  "CABG, MI or angioplasty before 65F 55M? Not Asked   Social History Narrative     Not on file     No family history on file.  Current Facility-Administered Medications   Medication     acetaminophen (TYLENOL) Suppository 650 mg     acetaminophen (TYLENOL) tablet 650 mg     aspirin (ASA) EC tablet 325 mg    Or     aspirin (ASA) Suppository 300 mg     [START ON 1/15/2019] atorvastatin (LIPITOR) tablet 40 mg     bisacodyl (DULCOLAX) Suppository 10 mg     lidocaine (LMX4) cream     lidocaine 1 % 1 mL     Medication Instruction     melatonin tablet 1 mg     naloxone (NARCAN) injection 0.1-0.4 mg     ondansetron (ZOFRAN-ODT) ODT tab 4 mg    Or     ondansetron (ZOFRAN) injection 4 mg     polyethylene glycol (MIRALAX/GLYCOLAX) Packet 17 g     prochlorperazine (COMPAZINE) injection 5 mg    Or     prochlorperazine (COMPAZINE) tablet 5 mg    Or     prochlorperazine (COMPAZINE) Suppository 12.5 mg     senna-docusate (SENOKOT-S/PERICOLACE) 8.6-50 MG per tablet 1 tablet    Or     senna-docusate (SENOKOT-S/PERICOLACE) 8.6-50 MG per tablet 2 tablet     sodium chloride (PF) 0.9% PF flush 3 mL     sodium chloride (PF) 0.9% PF flush 3 mL     sodium chloride 0.9% infusion     No Known Allergies    Review of Systems:  14-point review of systems was completed. The pertinent positives and negatives are in the HPI, and the remainder was negative unless otherwise mentioned.    Physical Exam:  /62 (BP Location: Right arm)   Pulse 62   Temp 99.8  F (37.7  C) (Oral)   Resp 16   Ht 1.778 m (5' 10\")   Wt 63.5 kg (140 lb)   SpO2 94%   BMI 20.09 kg/m     General: Pleasant. Dressed appropriately for season. Resting comfortably in bed.  Head: Normocephalic, atraumatic.  No temporal tenderness bilaterally  Eyes: No conjunctival injection, no scleral icterus.  Mouth: No oral lesions, no erythema or exudate in the oropharynx  Respiratory: Non-labored breathing on room air. No accessory muscle use.  Cardiovascular: Peripheral pulses intact. " No carotid bruits.  Abdomen: Soft   Extremities:  Warm, dry without peripheral edema  Neurologic:    Mental Status Exam:  Alert, awake. Fully oriented. Provides a thorough history. Speech of normal fluency.  Cranial Nerves:  EOMs intact, no nystagmus, facial movements symmetric, facial sensation intact to light touch, hearing intact to conversation, palate and uvula rise symmetrically, no deviation in uvula or tongue, symmetric shoulder shrug, tongue midline and fully mobile.   Motor:  Normal tone in all four extremities, no atrophy or fasciculations were seen. 5/5 strength bilaterally in shoulder abduction, elbow extensors and flexors, wrist extension, finger abduction, hip flexors, knee extensors and flexors. No tremors.  Sensory: Decreased sensation to light touch bilateral lower extremities which appears to be chronic from her neuropathy.  Coordination: Finger-nose-finger without dysmetria bilaterally. Finger tapping regular and rhythmic bilaterally.   Reflexes: Not tested.   Gait: Deferred    National [employer] Stroke Scale  Exam Interval: Baseline   Score    Level of consciousness: (0)   Alert, keenly responsive    LOC questions: (0)   Answers both questions correctly    LOC commands: (0)   Performs both tasks correctly    Best gaze: (0)   Normal    Visual: (0)   No visual loss    Facial palsy: (0)   Normal symmetrical movements    Motor arm (left): (0)   No drift    Motor arm (right): (0)   No drift    Motor leg (left): (0)   No drift    Motor leg (right): (0)   No drift    Limb ataxia: (0)   Absent    Sensory: (0)   Normal- no sensory loss    Best language: (0)   Normal- no aphasia    Dysarthria: (0)   Normal    Extinction and inattention: (0)   No abnormality        Total Score:  0     Laboratory:  Lab Results   Component Value Date    WBC 8.1 01/14/2019    HGB 11.8 01/14/2019    HCT 34.3 (L) 01/14/2019     01/14/2019     01/14/2019    POTASSIUM 4.3 01/14/2019    CHLORIDE 102  01/14/2019    CO2 27 01/14/2019    BUN 19 01/14/2019    CR 0.79 01/14/2019     (H) 01/14/2019    TROPI <0.015 01/14/2019    AST 19 01/14/2019    ALT 18 01/14/2019    ALKPHOS 77 01/14/2019    BILITOTAL 0.4 01/14/2019    INR 0.98 08/19/2010     Imaging:  CTH: Diffuse cerebral volume loss, no evidence for acute intracranial pathology  CTA head and neck: Not performed  MRI/ MRA: Pending    Assessment/Recommendations:  Lisa Damon is a 83 year old female with past medical history of Raynaud's syndrome, idiopathic peripheral neuropathy, hypothyroidism who presented to the ED with an episode of confusion, bilateral ocular pressure and left-sided weakness which has resolved.  Her night stroke scale on arrival was 0, CT head without contrast in the ED did not show any acute intracranial abnormality.  Likely etiology is TIA.    #TIA  - ABCD2 Score: 6  - Neurochecks Q 4  - Euthermia, Euglycemia  - Daily aspirin for secondary stroke prevention  - Statin  - MRI/MRA Stroke Protocol  - TTE with Bubble Study  - 24-hour Telemetry  - Bedside Glucose Monitoring  - A1c, Lipid Panel  - PT/OT/SLP  - Stroke Education  - Depression Screen  - Apnea Screen  - ESR and CRP to evaluate for GCA    DVT ppx: SCDs  Diet: Regular diet  Code status: DNR/DNI  Disposition: Pending evaluation    Plan was discussed with patient and patient's daughters at bedside who expressed understanding we will continue to follow along closely, please call us with any questions or concerns, case was seen and discussed with Dr. Eusebio Nichole MD  Vascular Neurology fellow  Pager # [phone]

## 2019-01-14 NOTE — ED NOTES
"[facility]  ED Nurse Handoff Report    ED Chief complaint: Eye Problem (pt woke up with blurred vision and pressure around bpth eyes, not c/o ha )      ED Diagnosis:   Final diagnoses:   Altered mental status, unspecified altered mental status type   Visual disturbance   Dehydration       Code Status: Hospitalist to address    Allergies: No Known Allergies    Activity level - Baseline/Home:  Independent    Activity Level - Current:   Stand with Assist     Needed?: No    Isolation: No  Infection: Not Applicable  Bariatric?: No    Vital Signs:   Vitals:    01/14/19 0900 01/14/19 1000 01/14/19 1100 01/14/19 1200   BP: 141/79 139/84 154/64 142/69   Pulse: 70 83 72 71   Temp: 98.3  F (36.8  C)      TempSrc: Oral      SpO2:  95% 93% 95%   Weight:       Height:           Cardiac Rhythm: ,        Pain level:      Is this patient confused?: yes slow to respond and forgetful  Does this patient have a guardian?  No         If yes, is there guardianship documents in the Epic \"Code/ACP\" activity?           Guardian Notified?  daughters in room  [place] - Suicide Severity Rating Scale Completed?  yes  If yes, what color did the patient score?  White    Patient Report: Initial Complaint: 83 year old female who presents via EMS with altered mental status. Patient states that she woke up this morning with blurry vision and pressure around both her eyes/headache. She has a headache and some dizziness The patients daughter reports some confusion. The patient denies any chest pain, trouble breathing, abdominal pain, and extremity weakness. She denies any recent falls. She is not on blood thinning medications. The patients daughter states that yesterday when she talked to her the patient was complaining of a sore throat.        Focused Assessment: .Pt had delayed responses and forgetfulness. Pupils are equal, round, and reactive to light.   Lungs are clear and equal bilaterally.          Tests Performed: "   Results for orders placed or performed during the hospital encounter of 01/14/19   CT Head w/o Contrast    Narrative    CT OF THE HEAD WITHOUT CONTRAST January 14, 2019 9:35 AM     HISTORY: Altered level of consciousness (LOC), unexplained.    TECHNIQUE: 5 mm thick axial CT images of the head were acquired  without IV contrast material. Radiation dose for this scan was reduced  using automated exposure control, adjustment of the mA and/or kV  according to patient size, or iterative reconstruction technique.    COMPARISON: None.    FINDINGS: There is mild diffuse cerebral volume loss. There are subtle  patchy areas of decreased density in the cerebral white matter  bilaterally that are consistent with sequela of chronic small vessel  ischemic disease.     The ventricles and basal cisterns are within normal limits in  configuration given the degree of cerebral volume loss. There is no  midline shift. There are no extra-axial fluid collections.     No intracranial hemorrhage, mass or recent infarct.    The visualized paranasal sinuses are well-aerated. There is no  mastoiditis. There are no fractures of the visualized bones.       Impression    IMPRESSION: Diffuse cerebral volume loss and cerebral white matter  changes consistent with chronic small vessel ischemic disease. No  evidence for acute intracranial pathology.              CBC with platelets differential   Result Value Ref Range    WBC 6.4 4.0 - 11.0 10e9/L    RBC Count 3.93 3.8 - 5.2 10e12/L    Hemoglobin 12.0 11.7 - 15.7 g/dL    Hematocrit 35.1 35.0 - 47.0 %    MCV 89 78 - 100 fl    MCH 30.5 26.5 - 33.0 pg    MCHC 34.2 31.5 - 36.5 g/dL    RDW 12.9 10.0 - 15.0 %    Platelet Count 335 150 - 450 10e9/L    Diff Method Automated Method     % Neutrophils 70.7 %    % Lymphocytes 19.5 %    % Monocytes 7.9 %    % Eosinophils 1.1 %    % Basophils 0.5 %    % Immature Granulocytes 0.3 %    Nucleated RBCs 0 0 /100    Absolute Neutrophil 4.5 1.6 - 8.3 10e9/L    Absolute  Lymphocytes 1.2 0.8 - 5.3 10e9/L    Absolute Monocytes 0.5 0.0 - 1.3 10e9/L    Absolute Eosinophils 0.1 0.0 - 0.7 10e9/L    Absolute Basophils 0.0 0.0 - 0.2 10e9/L    Abs Immature Granulocytes 0.0 0 - 0.4 10e9/L    Absolute Nucleated RBC 0.0    Comprehensive metabolic panel   Result Value Ref Range    Sodium 137 133 - 144 mmol/L    Potassium 4.3 3.4 - 5.3 mmol/L    Chloride 102 94 - 109 mmol/L    Carbon Dioxide 27 20 - 32 mmol/L    Anion Gap 8 3 - 14 mmol/L    Glucose 114 (H) 70 - 99 mg/dL    Urea Nitrogen 19 7 - 30 mg/dL    Creatinine 0.79 0.52 - 1.04 mg/dL    GFR Estimate 69 >60 mL/min/[1.73_m2]    GFR Estimate If Black 80 >60 mL/min/[1.73_m2]    Calcium 9.2 8.5 - 10.1 mg/dL    Bilirubin Total 0.4 0.2 - 1.3 mg/dL    Albumin 3.5 3.4 - 5.0 g/dL    Protein Total 6.8 6.8 - 8.8 g/dL    Alkaline Phosphatase 77 40 - 150 U/L    ALT 18 0 - 50 U/L    AST 19 0 - 45 U/L   Magnesium   Result Value Ref Range    Magnesium 2.1 1.6 - 2.3 mg/dL   Phosphorus   Result Value Ref Range    Phosphorus 3.3 2.5 - 4.5 mg/dL   TSH with free T4 reflex   Result Value Ref Range    TSH 0.18 (L) 0.40 - 4.00 mU/L   UA reflex to Microscopic and Culture   Result Value Ref Range    Color Urine Light Yellow     Appearance Urine Clear     Glucose Urine Negative NEG^Negative mg/dL    Bilirubin Urine Negative NEG^Negative    Ketones Urine Negative NEG^Negative mg/dL    Specific Gravity Urine 1.008 1.003 - 1.035    Blood Urine Negative NEG^Negative    pH Urine 7.0 5.0 - 7.0 pH    Protein Albumin Urine Negative NEG^Negative mg/dL    Urobilinogen mg/dL Normal 0.0 - 2.0 mg/dL    Nitrite Urine Negative NEG^Negative    Leukocyte Esterase Urine Negative NEG^Negative    Source Catheterized Urine    Troponin I   Result Value Ref Range    Troponin I ES <0.015 0.000 - 0.045 ug/L   T4 free   Result Value Ref Range    T4 Free 1.72 (H) 0.76 - 1.46 ng/dL   EKG 12-lead, tracing only   Result Value Ref Range    Interpretation ECG Click View Image link to view waveform  and result        Abnormal Results: See above  Treatments provided: IVF    Family Comments: At bedside.     OBS brochure/video discussed/provided to patient/family: yes              Name of person given brochure if not patient: daughters             ED Medications:   Medications   lactated ringers BOLUS 1,000 mL (0 mLs Intravenous Stopped 1/14/19 1130)   ondansetron (ZOFRAN) 2 MG/ML injection (4 mg  Given 1/14/19 0942)       Drips infusing?:  No    For the majority of the shift this patient was Green.   Interventions performed were .    Severe Sepsis OR Septic Shock Diagnosis Present: No        ED NURSE PHONE NUMBER: [phone]

## 2019-01-14 NOTE — H&P
Admitted:     01/14/2019      PRIMARY CARE PHYSICIAN:  Pb Andrews MD.      CHIEF COMPLAINT:  Altered mental status with blurred vision.      HISTORY OF PRESENT ILLNESS:  Lisa Damon is an 83-year-old female with a past medical history significant for hypothyroidism, peripheral neuropathy, Raynaud's syndrome, scoliosis and a recent cholesteatoma excision who presented to [facility] Emergency Department due to altered mental status/confusion with blurred vision and headache.      The patient indicated that she awoke this morning with blurred vision and pressure around both eyes.  She felt slightly dizzy.  She called her daughters and fortunately they were present in the Emergency Department during my assessment, who indicate that she was in fact confused.  She also seemed to be having some weakness in her left upper extremity.  The patient's symptoms have since resolved.      The patient was evaluated in the Emergency Department by Dr. Antunez.  Evaluation has included a comprehensive metabolic panel revealing a creatinine of 0.79 with a GFR of 69, glucose of 114, otherwise within normal limits.  TSH with free T4 were performed and TSH was low at 0.18 and free T4 is elevated at 1.72.  Initial troponin was undetectable.  CBC with differential was unremarkable with a white count of 6.4, hemoglobin of 12, platelet count of 335, otherwise within normal limits.  Urinalysis was negative.  A head CT without contrast was performed which showed diffuse cerebral volume loss and cerebral white matter changes consistent with chronic small vessel ischemic disease, no evidence of acute intracranial pathology.  EKG was performed which showed sinus rhythm without ischemic changes.  A Rapid Strep was collected and currently in process.  The patient received a liter of IV fluids and 4 mg of IV Zofran and was then registered to observation under the Hospitalist Service for continued evaluation and management.      I  evaluated the patient in the Emergency Department with her daughter present at bedside.  Again, patient has had resolution of her confusion, blurred vision and weakness.  The patient's daughter indicated that she was not able to complete sentences, had difficulty finding words and could  anything with her left hand and seemed to be having some discoordination of the left arm.      Upon asking the patient, she indicates she is always cold.  She has had a sore throat for several days to a week.  She has had worsening fatigue and weakness and complains of dry skin in the winter.  She has had a headache for 1-2 days and was experiencing blurred vision earlier today.  She states she might be short of breath at times and has an ongoing dry cough.  This morning she felt nauseated and she is having issues with ongoing constipation.  She has significant peripheral neuropathy affecting all 4 extremities.      PAST MEDICAL HISTORY:   1.  Hypothyroidism.   2.  Peripheral neuropathy.   3.  Raynaud syndrome.   4.  Scoliosis.      PAST SURGICAL HISTORY:   1.  L4-L5 diskectomy with fusion.   2.  Hysterectomy.   3.  Bilateral total shoulder arthroplasty.   4.  Mass excised from the forearm.   5.  Tonsillectomy.   6.  Bilateral cataract and lens replacement.   7.  Cholesteatoma excision and tympanoplasty performed by Dr. Suh recently.      PRIOR TO ADMIT MEDICATIONS:    Prior to Admission Medications   Prescriptions Last Dose Informant Patient Reported? Taking?   Naproxen Sodium (ALEVE PO) Past Week at Unknown time  Yes Yes   Sig: Take 1 tablet by mouth 2 times daily as needed    calcium-vitamin D 500-125 MG-UNIT TABS Past Week at Unknown time  Yes Yes   Sig: Take 1 tablet by mouth daily    levothyroxine (SYNTHROID/LEVOTHROID) 100 MCG tablet Past Week at Unknown time  Yes Yes   Sig: Take 100 mcg by mouth daily   multivitamin, therapeutic with minerals (MULTI-VITAMIN) TABS tablet Past Week at Unknown time  Yes Yes   Sig: Take 1  tablet by mouth daily      Facility-Administered Medications: None        ALLERGIES:  NO KNOWN DRUG ALLERGIES, THOUGH PATIENT INDICATES GABAPENTIN CAUSES SEVERE DIARRHEA.      SOCIAL HISTORY:  The patient currently resides in a house in [facility].  She has a remote history of tobacco use, smoking cigarettes, though quit a 56 years ago.  She denies street or illicit drug use.  She denies consumption of alcohol and does not currently utilize a cane or a walker.      FAMILY HISTORY:   1.  Mother passed away at the age of 93, had rheumatoid arthritis and a CVA.   2.  Brother passed away due to heart disease.   3.  Sister passed away due to a CVA.      REVIEW OF SYSTEMS:  A 10-point review of systems was performed.  All pertinent positives are listed in the history of present illness, otherwise is negative.      PHYSICAL EXAMINATION:   VITAL SIGNS:  Temperature is 98.3 degrees Fahrenheit with a blood pressure of 142/69, heart rate of 71 beats per minute, respiratory rate of 95% on room air, patient is denying any pain.   GENERAL:  The patient is awake, alert, cooperative, in no apparent distress, alert and oriented x 3.   HEENT:  Normocephalic, atraumatic.  Moist mucous membranes present.  No exudates noted in the posterior pharynx.  Uvula is midline.  Eyes, pupils are equal, round, reactive to light.  Extraocular movements are intact.  Normal sclerae.   NECK:  Supple, normal range of motion, no tracheal deviation, no cervical lymphadenopathy present.    CARDIOVASCULAR:  regular rate and rhythm, no rubs, murmurs or gallops appreciated.   PULMONARY:  Lungs are clear to auscultation bilaterally, no wheezes, rhonchi or rales appreciated.   GASTROINTESTINAL:  Bowel sounds are present in all 4 quadrants, soft, nontender, nondistended.   NEUROLOGIC:  Cranial nerves II-XII are grossly intact.  The patient demonstrated equal sensation, coordination and strength in the upper and lower extremities bilaterally.  The  patient completed bilateral upper extremity finger-to-nose examination without difficulty, had a negative pronator drift in the upper extremities and also completed heel shin slide of the lower extremities bilaterally without difficulty.   EXTREMITIES:  No lower extremity edema noted bilaterally.  Calves are nontender to palpation.      ASSESSMENT AND PLAN:     Lisa Damon is an 83-year-old female with a past medical history significant for hypothyroidism, peripheral neuropathy, Raynaud syndrome, scoliosis who is being registered to observation due to a TIA.   1.  TIA:  The patient presented with confusion, blurred vision, headache and left upper extremity weakness.  All symptoms had resolved by the time I evaluated the patient in the Emergency Department.  A head CT was negative and initial troponin was undetectable.  Will consult Neurology Service.  Will monitor on continuous telemetry, order echo with bubble study, brain MRI and bilateral carotid ultrasound.  Will initiate aspirin therapy daily.  Will check a hemoglobin A1c, lipid panel, trend serial troponin.  The patient is currently n.p.o. and will undergo bedside dysphagia screen, if passes will advance diet.  The patient will be evaluated by PT, OT and Speech Language.  Neurochecks and vital signs every 4 hours.  Continue IVF at 100 ml/hr with NS.    2.  Hypothyroidism:  TSH is low at 0.18 and a free T4 of 1.72.  Discussed findings with patient and patient's daughter and recommend repeat TSH with free T4 in 6 weeks and at that point have an adjustment of levothyroxine if abnormal.  Will hold Levothyroxine as patient is observation status.    3.  Peripheral neuropathy:  The patient states that this is a longstanding issue, predominantly affects her lower extremities but seems to be in her hands as well.  Will have as needed Tylenol made available and will monitor.   4.  DVT prophylaxis:  Would encourage ambulation.      CODE STATUS:  Discussed with the  patient, she elected to be DNR/DNI.      DISPOSITION:  The patient is being registered under observation status.  The patient will begin workup for TIA.  If MRI confirms stroke, will likely transfer to inpatient status.  Otherwise, if workup is unremarkable, the patient will likely discharge home tomorrow.      The patient was discussed with Dr. Toro who agrees with the assessment and plan as stated above.  Dr. Toor will evaluate the patient independently.         ALLY TORO MD       As dictated by KELECHI RIZVI PA-C            D: 2019   T: 2019   MT: [id number]      Name:     CUONG GARCIA   MRN:      [id number]        Account:      [id number]   :      1935        Admitted:     2019                   Document: [id number]       cc: Pb Andrews MD

## 2019-01-14 NOTE — ED PROVIDER NOTES
History     Chief Complaint:  Altered mental status    The history is provided by the patient and a relative. The history is limited by the condition of the patient.      Lisa Damon is a 83 year old female who presents via EMS with altered mental status. Patient states that she woke up this morning with blurry vision and pressure around both her eyes/headache. She has a headache and some dizziness. Nursing staff here notes delayed responses and forgetfulness. The patients daughter reports some confusion. The patient denies any chest pain, trouble breathing, abdominal pain, and extremity weakness. She denies any recent falls. She is not on blood thinning medications. The patients daughter states that yesterday when she talked to her the patient was complaining of a sore throat.     Allergies:  No known drug allergies.    Medications:    Calcium-vitamin d 500-125 mg-unit tabs  Hydrocodone-acetaminophen (norco) 5-325 mg per tablet  Levothyroxine sodium po  Multivitamin, therapeutic with minerals (multi-vitamin) tabs tablet  Naproxen sodium (aleve po)    Past Medical History:    Arthritis  Cholesteatoma   Peripheral neuropathy  Raynaud's syndrome  Scoliosis  Hypothyroidism    Past Surgical History:    L4-5 fusion   Hysterectomy  Discectomy  Bilateral shoulder arthoplasty  Tonsillectomy  Tympanoplasty  Cataracts     Family History:    History reviewed. No pertinent family history.    Social History:  Patient is single  Tobacco Use: Former smoker  Alcohol Use: No  PCP: Pb Andrews     Review of Systems   Constitutional: Negative for fever.   HENT: Positive for sore throat.    Eyes: Positive for visual disturbance.   Respiratory: Negative for shortness of breath.    Cardiovascular: Negative for chest pain.   Gastrointestinal: Negative for abdominal pain.   Neurological: Positive for dizziness and headaches. Negative for weakness.   Psychiatric/Behavioral: Positive for confusion.   All other systems  "reviewed and are negative.    Physical Exam   First Vitals:  Patient Vitals for the past 24 hrs:   BP Temp Temp src Pulse SpO2 Height Weight   01/14/19 1200 142/69 -- -- 71 95 % -- --   01/14/19 1100 154/64 -- -- 72 93 % -- --   01/14/19 1000 139/84 -- -- 83 95 % -- --   01/14/19 0900 141/79 98.3  F (36.8  C) Oral 70 -- -- --   01/14/19 0831 151/82 -- -- 68 98 % 1.778 m (5' 10\") 63.5 kg (140 lb)   01/14/19 0830 151/82 -- -- 68 -- -- --     Physical Exam  General: Well appearing, nontoxic. Resting comfortably  Head:  Scalp, face, and head appear normal, atraumatic.  Eyes:  Pupils are equal, round, and reactive to light, EOMI, no nystagmus. Vision intact to   finger counting at 24\" with both binocular vision and monocular vision bilaterally. Vision   intact to finger movement peripherally.    Conjunctivae non-injected and sclerae white  ENT:    The external nose is normal    Pinnae are normal    The oropharynx is normal, mucous membranes dry    Uvula is in the midline  Neck:  Normal range of motion    There is no rigidity noted    Trachea is in the midline  CV:  Regular rate and rhythm     Normal S1/S2, no S3/S4    No murmur or rub. Radial pulses 2+ bilaterally. DP pulses 2+ and intact bilaterally.  Resp:  Lungs are clear and equal bilaterally    There is no tachypnea    No increased work of breathing    No rales, wheezing, or rhonchi  GI:  Abdomen is soft, no rigidity or guarding    No distension, or mass    No tenderness or rebound tenderness   MS:  Normal muscular tone    Symmetric motor strength    No lower extremity edema  Skin:  No rash or acute skin lesions noted  Neuro: A&Ox3, GCS 14 (pt opens eyes to voice). Follows commands however requires repetition and her responses are delayed.    CN II - XII intact    Speech slowed but otherwise clear, fluent, and normal    Strength 5/5 and symmetric in bilateral upper and lower extremities.    No pronator drift. No leg drift. Decreased sensation bilateral lower " extremities below the knees (chronic peripheral neuropathy)    no ankle clonus    FTN testing normal. No tremor.     No meningismus   Psych:  Normal affect.  Appropriate interactions.     Emergency Department Course   ECG:  @ 0919  Indication: Altered Mental Status   Vent. Rate 67 bpm. NY interval 150 ms. QRS duration 72 ms. QT/QTc 398/420 ms. P-R-T axis 70 75 69.   Normal sinus rhythm. Normal ECG.   Read @ 0924 by Dr. Antunez.    Imaging:  Radiographic findings were communicated with the patient and family who voiced understanding of the findings.  CT head w/o contrast:  Diffuse cerebral volume loss and cerebral white matter  changes consistent with chronic small vessel ischemic disease. No  evidence for acute intracranial pathology. Per radiology read.    Laboratory:  CBC:  WBC 6.4, HGB 12.0,   CMP: Glucose 114 high, o/w WNL. (Creatinine 0.79)  Magnesium: 2.1  Phosphorus: 3.3  TSH: 0.18 low  Troponin: <0.015  UA: WNL    Interventions:  0841: LR 1L IV  0942: Zofran 4mg IV    Emergency Department Course:  8:27 AM Nursing notes and vitals reviewed.  I performed an exam of the patient as documented above.   12:26 PM I rechecked on and update the patient and family.  Findings and plan explained to the patient who consents to admission.   1:02 PM I discussed the patient with Dr. Toro of the hospitalist service, who will admit the patient to an observational bed for further monitoring, evaluation, and treatment.    Impression & Plan      Medical Decision Making:  Lisa Damon is a 83 year old female who presents with altered mental status. On my evaluation the patient appeared somnolent and slow to respond but had no definite focal neurological deficits. She complained of pressure behind her eyes and vision changes however her vision was intact to finger counting and she did not appear to have any gross visual field cuts. The remainder of her neurologic exam is otherwise normal. She was afebrile without  evidence of sepsis. Broad differential diagnoses considered including TIA, stroke, dehydration, infection, metabolic disturbance, among other considered etiologies. Notably the patient recent had right middle ear surgery with ENT for treatment of cholesteatoma. CT scan of the head was obtained which shows chronic changes but no evidence of acute intercranial pathologies. Laboratory studies were otherwise fairly unremarkable. Urinalysis negative for infection. CBC is unremarkable. CMP again unremarkable. Troponin was negative. TSH and free T4 actually show hyperthyroidism with a suppressed TSH and slightly high T4. The patient maybe slightly over repleted on her synthroid but I do not feel that this represents acute thyrotoxicosis or other thyroid related issue that would explain her symptoms. The patient significantly improved after IV fluids. She did appear clinically dehydrated, however her family reported that she still was not quite at baseline and she was persistently still generally weak. I feel that the patient will likely require MRI to rule out potential ischemic event such as stroke, therefor she will be admitted to the hospitalist service for further monitoring, evaluation, and treatment. The case was discussed with the hospitalist and the patient will be admitted. The patient was admitted in stable and improving condition.     Diagnosis:    ICD-10-CM    1. Altered mental status, unspecified altered mental status type R41.82 Rapid strep screen     Beta strep group A culture     Beta strep group A culture   2. Visual disturbance H53.9    3. Dehydration E86.0        Disposition:  Admitted to the hospitalist    I, Bradley Aasen, am serving as a scribe on 1/14/2019 at 8:44 AM to personally document services performed by Kenneth Antunez MD based on my observations and the provider's statements to me.      Kenneth Antunez MD  01/14/19 2028

## 2019-01-14 NOTE — ED NOTES
Bed: ED26  Expected date:   Expected time:   Means of arrival:   Comments:  414 83 f HTN/vision loss  ETA 0833

## 2019-01-15 ENCOUNTER — APPOINTMENT (OUTPATIENT)
Dept: CARDIOLOGY | Facility: CLINIC | Age: 84
End: 2019-01-15
Payer: COMMERCIAL

## 2019-01-15 ENCOUNTER — APPOINTMENT (OUTPATIENT)
Dept: PHYSICAL THERAPY | Facility: CLINIC | Age: 84
End: 2019-01-15
Payer: COMMERCIAL

## 2019-01-15 VITALS
OXYGEN SATURATION: 94 % | DIASTOLIC BLOOD PRESSURE: 71 MMHG | SYSTOLIC BLOOD PRESSURE: 140 MMHG | RESPIRATION RATE: 16 BRPM | HEIGHT: 70 IN | BODY MASS INDEX: 20.04 KG/M2 | TEMPERATURE: 98.5 F | HEART RATE: 69 BPM | WEIGHT: 140 LBS

## 2019-01-15 LAB
GLUCOSE BLDC GLUCOMTR-MCNC: 80 MG/DL (ref 70–99)
TROPONIN I SERPL-MCNC: 0.02 UG/L (ref 0–0.04)

## 2019-01-15 PROCEDURE — 99217 ZZC OBSERVATION CARE DISCHARGE: CPT | Performed by: PHYSICIAN ASSISTANT

## 2019-01-15 PROCEDURE — 25000132 ZZH RX MED GY IP 250 OP 250 PS 637: Performed by: PHYSICIAN ASSISTANT

## 2019-01-15 PROCEDURE — G0378 HOSPITAL OBSERVATION PER HR: HCPCS

## 2019-01-15 PROCEDURE — 40000264 ECHOCARDIOGRAM COMPLETE

## 2019-01-15 PROCEDURE — 40000193 ZZH STATISTIC PT WARD VISIT

## 2019-01-15 PROCEDURE — 00000146 ZZHCL STATISTIC GLUCOSE BY METER IP

## 2019-01-15 PROCEDURE — 99213 OFFICE O/P EST LOW 20 MIN: CPT | Performed by: PSYCHIATRY & NEUROLOGY

## 2019-01-15 PROCEDURE — 40000895 ZZH STATISTIC SLP IP EVAL DEFER: Performed by: SPEECH-LANGUAGE PATHOLOGIST

## 2019-01-15 PROCEDURE — 93270 REMOTE 30 DAY ECG REV/REPORT: CPT

## 2019-01-15 PROCEDURE — 99207 ZZC CDG-CODE CATEGORY CHANGED: CPT | Performed by: PHYSICIAN ASSISTANT

## 2019-01-15 PROCEDURE — 25000132 ZZH RX MED GY IP 250 OP 250 PS 637: Performed by: PSYCHIATRY & NEUROLOGY

## 2019-01-15 PROCEDURE — 93306 TTE W/DOPPLER COMPLETE: CPT | Mod: 26 | Performed by: INTERNAL MEDICINE

## 2019-01-15 PROCEDURE — 93272 ECG/REVIEW INTERPRET ONLY: CPT | Performed by: INTERNAL MEDICINE

## 2019-01-15 PROCEDURE — 97116 GAIT TRAINING THERAPY: CPT | Mod: GP

## 2019-01-15 PROCEDURE — 25000128 H RX IP 250 OP 636: Performed by: PHYSICIAN ASSISTANT

## 2019-01-15 PROCEDURE — 97161 PT EVAL LOW COMPLEX 20 MIN: CPT | Mod: GP

## 2019-01-15 RX ORDER — ATORVASTATIN CALCIUM 10 MG/1
10 TABLET, FILM COATED ORAL
Status: DISCONTINUED | OUTPATIENT
Start: 2019-01-15 | End: 2019-01-15 | Stop reason: HOSPADM

## 2019-01-15 RX ORDER — ASPIRIN 81 MG/1
81 TABLET, CHEWABLE ORAL DAILY
Status: DISCONTINUED | OUTPATIENT
Start: 2019-01-16 | End: 2019-01-15 | Stop reason: HOSPADM

## 2019-01-15 RX ORDER — CLOPIDOGREL BISULFATE 75 MG/1
75 TABLET ORAL DAILY
Qty: 21 TABLET | Refills: 0 | Status: SHIPPED | OUTPATIENT
Start: 2019-01-16 | End: 2019-01-01

## 2019-01-15 RX ORDER — ATORVASTATIN CALCIUM 10 MG/1
10 TABLET, FILM COATED ORAL DAILY
Qty: 30 TABLET | Refills: 0 | Status: ON HOLD | OUTPATIENT
Start: 2019-01-15 | End: 2020-01-01

## 2019-01-15 RX ORDER — ASPIRIN 81 MG/1
81 TABLET, CHEWABLE ORAL DAILY
Qty: 30 TABLET | Refills: 0 | Status: SHIPPED | OUTPATIENT
Start: 2019-01-16 | End: 2019-01-01

## 2019-01-15 RX ORDER — ATORVASTATIN CALCIUM 10 MG/1
20 TABLET, FILM COATED ORAL
Status: DISCONTINUED | OUTPATIENT
Start: 2019-01-15 | End: 2019-01-15

## 2019-01-15 RX ORDER — CLOPIDOGREL BISULFATE 75 MG/1
300 TABLET ORAL ONCE
Status: COMPLETED | OUTPATIENT
Start: 2019-01-15 | End: 2019-01-15

## 2019-01-15 RX ORDER — CLOPIDOGREL BISULFATE 75 MG/1
75 TABLET ORAL DAILY
Status: DISCONTINUED | OUTPATIENT
Start: 2019-01-16 | End: 2019-01-15 | Stop reason: HOSPADM

## 2019-01-15 RX ORDER — LEVOTHYROXINE SODIUM 75 UG/1
75 TABLET ORAL DAILY
Qty: 30 TABLET | Refills: 0 | Status: SHIPPED | OUTPATIENT
Start: 2019-01-15 | End: 2019-01-01

## 2019-01-15 RX ADMIN — CLOPIDOGREL BISULFATE 300 MG: 75 TABLET, FILM COATED ORAL at 12:20

## 2019-01-15 RX ADMIN — SODIUM CHLORIDE: 9 INJECTION, SOLUTION INTRAVENOUS at 07:21

## 2019-01-15 RX ADMIN — ASPIRIN 325 MG: 325 TABLET, DELAYED RELEASE ORAL at 08:02

## 2019-01-15 NOTE — PLAN OF CARE
PRIMARY DIAGNOSIS: SYNCOPE/TIA  OUTPATIENT/OBSERVATION GOALS TO BE MET BEFORE DISCHARGE:  1. Orthostatic performed: N/A    2. Diagnostic testing complete & at baseline neurologic testing: Yes    3. Cleared by consultants (if involved): Yes    4. Interpretation of cardiac rhythm per telemetry tech: SR    5. Tolerating adequate PO diet and medications: Yes    6. Return to near baseline physical activity or neurologic status: Yes    Discharge Planner Nurse   Safe discharge environment identified: Yes  Barriers to discharge: No       Entered by: Ping Garrett 01/15/2019 11:15 AM     Please review provider order for any additional goals.   Nurse to notify provider when observation goals have been met and patient is ready for discharge.    A&Ox4. VSS. Neuros intact. Baseline neuropathy in BLE. Tolerating regular diet. Ambulating SBA + walker. Pt feels she is at baseline activity wise. Slight dizziness with movement. Echo complete and resulted. Denies pain.

## 2019-01-15 NOTE — PROGRESS NOTES
A&O, VSS on RA, /hr, tele NSR, uses bedpan due to patient worrying about getting dizzy w/ambulation, voiding adequately, neuro intact, echo bubble, PT/OT/speech consults, refused PCD, trops neg x2, numbness BLE baseline, DNR/DNI, continue to monitor

## 2019-01-15 NOTE — DISCHARGE INSTRUCTIONS
Your risk factors for stroke or TIA (transient ischemic attack):    Your Risk Factors Your Results Normal Ranges   High blood pressure BP Readings from Last 1 Encounters:   01/15/19 140/71    Less than 120/80   Cholesterol              Total Lab Results   Component Value Date    CHOL 195 01/14/2019      Less than 150    Triglycerides   Lab Results   Component Value Date    TRIG 59 01/14/2019    Less than 150   LDL Lab Results   Component Value Date    LDL 79 01/14/2019       Less than 70   HDL Lab Results   Component Value Date     01/14/2019            Greater than 40 (men)  Greater than 50 (women)   Diabetes Recent Labs   Lab 01/14/19  1650   GLC 85    Fasting blood glucose    Smoking/tobacco use  Quit smoking and tobacco   Overweight  Lose 1-2 pounds a week   Lack of exercise  30 minutes moderate activity each day   Other risk factors include carotid (neck) artery disease, atrial fibrillation and stress. You may be on new medicine to treat high blood pressure, cholesterol, diabetes or atrial fibrillation.    Understanding Stroke Booklet given to patient. Please refer to booklet for further information.    Stroke warning signs and symptoms - CALL 911 right away for:  - Sudden numbness or weakness in the face, arm or leg (often on one side of the body).  - Sudden confusion or trouble understanding what is going on.  - Sudden blurred or decreased vision in one or both eyes.  - Sudden trouble speaking, loss of balance, dizziness or problems with coordination.  - Sudden, severe headache for no reason.  - Fainting or seizures.  - Symptoms may go away then come back suddenly.

## 2019-01-15 NOTE — PROGRESS NOTES
"[facility]    Vascular Neurology Progress Note    Name: Lisa Damon  MRN: [id number]   Date of admission: 1/14/2019  Today's date: 01/15/2019    Subjective:    No acute events overnight  ESR and CRP were not elevated    Objective   /71 (BP Location: Right arm)   Pulse 69   Temp 98.5  F (36.9  C) (Oral)   Resp 16   Ht 1.778 m (5' 10\")   Wt 63.5 kg (140 lb)   SpO2 94%   BMI 20.09 kg/m    General: Adult, in NAD, cooperative  HEENT: NC/AT, no icterus,   Cardiac: RRR   Chest: Non labored breathing on room air, no wheezes or crackles  Abdomen: non-distended  Extremities: No LE swelling.  Skin: No rash or lesion noted  Psych: normal mood and affect  Neuro:  Mental status: Awake, alert, attentive. Speech is fluent, no aphasia. No dysarthria.  Cranial nerves: EOM intact, gaze conjugate and w/o nystagmus, pupils equal and reactive, visual fields full, face symmetric, facial sensation intact, shoulder shrug strong, palate rise symmetric, tongue/uvula midline, hearing intact to conversation.  Motor: Tone normal. 5/5 strength in all 4 extremities.  Reflexes: Not tested  Sensory: Intact to light touch on all extremities  Coordination: FNF without dysmetria, heel-shin normal  Gait: Not tested    Medications:    [START ON 1/16/2019] aspirin  81 mg Oral Daily     atorvastatin  20 mg Oral or NG Tube Daily at 8 pm     clopidogrel  300 mg Oral Once     [START ON 1/16/2019] clopidogrel  75 mg Oral Daily     sodium chloride (PF)  3 mL Intracatheter Q8H     Investigations    Lab Results   Component Value Date    WBC 8.1 01/14/2019    HGB 11.8 01/14/2019    HCT 34.3 (L) 01/14/2019     01/14/2019     01/14/2019    POTASSIUM 4.3 01/14/2019    CHLORIDE 103 01/14/2019    CO2 24 01/14/2019    BUN 15 01/14/2019    CR 0.80 01/14/2019    GLC 85 01/14/2019    SED 16 01/14/2019    TROPI 0.016 01/14/2019    AST 17 01/14/2019    ALT 17 01/14/2019    ALKPHOS 72 01/14/2019 "    BILITOTAL 0.4 01/14/2019    INR 0.98 08/19/2010     Stroke workup so far:  CTH: No acute intracranial abnormality  MRI: No evidence of acute infarct.  Nonspecific small vessel disease noted.  MRA head and neck: No significant stenosis or vascular occlusion  LDL: 79  A1c: 5.4  TTE: EF 60%, mild to moderate mitral regurgitation, normal left atrial size, no PFO    Assessment and Plan   Lisa Damon is a 83 year old year old female h/o idiopathic peripheral neuropathy, hypothyroidism who presented 1/14/2019 with episode of confusion, bilateral ocular pressure and left-sided weakness which had resolved.  NIH stroke scale on evaluation was 0.  Likely etiology is a TIA.    #TIA   - We will treat with dual antiplatelets for 3 weeks  - Load Plavix 300 mg x1 today  - Aspirin 81 mg + Plavix 75 mg daily for 3 weeks  - After 3 weeks, stop Plavix and continue aspirin 81 mg daily  - Lipitor 10 mg daily  - 30-day cardiac event monitor  - Stroke education    #Follow up  - PCP in 1-2 weeks  - Home clinic of Neurology in 4-6 weeks    DVT ppx: SCDs  Diet: Regular diet  Code status: DNR/DNI  Disposition: Home    Stroke team will sign off at this time.  Plan was discussed in detail with patient and patient's daughters at bedside who expressed understanding and all questions were answered.  Please call us with any questions or concerns, patient was seen and discussed with Dr. Eusebio Nichole MD  Vascular Neurology fellow  Pager # [phone]

## 2019-01-15 NOTE — PROGRESS NOTES
01/15/19 1020   Quick Adds   Type of Visit Initial PT Evaluation   Living Environment   Lives With alone   Living Arrangements house   Home Accessibility stairs to enter home;stairs within home  (10 stairs to basement, 2 steps to enter)   Self-Care   Usual Activity Tolerance good   Current Activity Tolerance moderate   Equipment Currently Used at Home none   Functional Level Prior   Ambulation 0-->independent   Transferring 0-->independent   Fall history within last six months no   Which of the above functional risks had a recent onset or change? none   General Information   Onset of Illness/Injury or Date of Surgery - Date 01/14/19   Referring Physician Fady Joseph PA-C   Patient/Family Goals Statement return home   Pertinent History of Current Problem (include personal factors and/or comorbidities that impact the POC) Admitted under observation status with AMS, blurry vision and dizziness. PMH: neuropathy, raynaud's, scoliosis, cholesteatoma excision.    Precautions/Limitations fall precautions   Weight-Bearing Status - LLE full weight-bearing   Weight-Bearing Status - RLE full weight-bearing   General Observations family present   Cognitive Status Examination   Orientation orientation to person, place and time   Level of Consciousness alert   Follows Commands and Answers Questions 100% of the time;able to follow multistep instructions   Personal Safety and Judgment intact   Pain Assessment   Patient Currently in Pain No   Posture    Posture Forward head position;Protracted shoulders;Kyphosis   Range of Motion (ROM)   ROM Quick Adds No deficits were identified   Strength   Strength Comments B hip flex 4+/5, knee ext 5/5, DF 5/5   Bed Mobility   Bed Mobility Comments Supine to sit independent   Transfer Skills   Transfer Comments Sit to stand with SBA   Gait   Gait Comments Pt amb 10 ft with IV pole and CGA   Balance   Balance Comments Balance mildly dec with IV pole, pt leaning heavily on it for support  "  Sensory Examination   Sensory Perception Comments Baseline peripheral neuropathy   General Therapy Interventions   Planned Therapy Interventions gait training;transfer training   Clinical Impression   Criteria for Skilled Therapeutic Intervention yes, treatment indicated   PT Diagnosis Difficulty ambulating   Influenced by the following impairments Dec balance, activity tolearnce   Functional limitations due to impairments Diffiuclty ambulating and transferring   Clinical Presentation Stable/Uncomplicated   Clinical Presentation Rationale medically stable   Clinical Decision Making (Complexity) Low complexity   Therapy Frequency` daily   Predicted Duration of Therapy Intervention (days/wks) 1 day   Anticipated Equipment Needs at Discharge walker   Anticipated Discharge Disposition Home with Home Therapy   Risk & Benefits of therapy have been explained Yes   Patient, Family & other staff in agreement with plan of care Yes   [facility] TM \"6 Clicks\"   2016, Trustees of [facility], under license to [employer].  All rights reserved.   6 Clicks Short Forms Basic Mobility Inpatient Short Form   [facility]  \"6 Clicks\" V.2 Basic Mobility Inpatient Short Form   1. Turning from your back to your side while in a flat bed without using bedrails? 4 - None   2. Moving from lying on your back to sitting on the side of a flat bed without using bedrails? 4 - None   3. Moving to and from a bed to a chair (including a wheelchair)? 3 - A Little   4. Standing up from a chair using your arms (e.g., wheelchair, or bedside chair)? 3 - A Little   5. To walk in hospital room? 3 - A Little   6. Climbing 3-5 steps with a railing? 3 - A Little   Basic Mobility Raw Score (Score out of 24.Lower scores equate to lower levels of function) 20   Total Evaluation Time   Total Evaluation Time (Minutes) 15     "

## 2019-01-15 NOTE — PLAN OF CARE
[id number]: Pt AO4.  Denies blurred vision.  Neuros intact besides baseline numbness in feet.  Regular diet tolerating well.  DNR/DNI.  Using bedpan throughout shift.

## 2019-01-15 NOTE — PROGRESS NOTES
-diagnostic tests and consults completed and resulted: NOT MET  -vital signs normal or at patient baseline: MET  -tolerating oral intake to maintain hydration: MET  -returns to baseline functional status: NOT MET, using bedpan  -safe disposition plan has been identified: NOT MET  Nurse to notify provider when observation goals have been met and patient is ready for discharge.

## 2019-01-15 NOTE — PROGRESS NOTES
"[facility], [place]    Vascular Neurology Progress Note    Name: Lisa Damon  MRN: [id number]   Date of admission: 1/14/2019  Today's date: 01/15/2019    Subjective:  83 year old female with PMH of hypothyroidism, peripheral neuropathy, and Reynauds syndrome, who presented after an episode of confusion, blurred vision, headache, and left upper extremity weakness yesterday morning concerning for TIA. Her symptoms completely resolved after a few hours and prior to admission. Today the patient reports no ongoing issues or concerns and feels as though she is back to normal. Her daughter also confirms this. She denies any visual deficits, headaches, weakness, fevers, muscle aches, or cognitive impairments.    Objective   /71 (BP Location: Right arm)   Pulse 69   Temp 98.5  F (36.9  C) (Oral)   Resp 16   Ht 1.778 m (5' 10\")   Wt 63.5 kg (140 lb)   SpO2 94%   BMI 20.09 kg/m    General: Adult, in NAD, cooperative  HEENT: NC/AT, no icterus, normocephalic  Cardiac: RRR, no m/r/g  Chest: Non labored breathing on room air, no wheezes or crackles  Abdomen: non-distended  Extremities: No LE swelling.  Skin: No rash or lesion noted  Psych: normal mood and affect  Neuro:  Mental status: Awake, alert, attentive. Speech is fluent, no aphasia. No dysarthria. Answers questions appropriately.  Cranial nerves: EOM intact, PERRL, gaze conjugate and w/o nystagmus, visual fields full, face symmetric, facial sensation intact, shoulder shrug strong, palate rise symmetric, tongue/uvula midline, hearing intact to conversation.  Motor: Tone normal. 5/5 strength in all 4 extremities. No atrophy or twitches. No tremor appreciated.   Reflexes: Deferred.  Sensory: Intact sensation in upper extremities, diminished but symmetric sensation in lower extremities d/t neuropathy.  Coordination: Finger-nose-finger performed without deficit or dysmetria, heel-shin normal.  Gait: Deferred.    Medications:    " aspirin  325 mg Oral Daily    Or     aspirin  300 mg Rectal Daily     atorvastatin  40 mg Oral or NG Tube Daily at 8 pm     sodium chloride (PF)  3 mL Intracatheter Q8H       Investigations    Lab Results   Component Value Date    WBC 8.1 01/14/2019    HGB 11.8 01/14/2019    HCT 34.3 (L) 01/14/2019     01/14/2019     01/14/2019    POTASSIUM 4.3 01/14/2019    CHLORIDE 103 01/14/2019    CO2 24 01/14/2019    BUN 15 01/14/2019    CR 0.80 01/14/2019    GLC 85 01/14/2019    SED 16 01/14/2019    TROPI 0.016 01/14/2019    AST 17 01/14/2019    ALT 17 01/14/2019    ALKPHOS 72 01/14/2019    BILITOTAL 0.4 01/14/2019    INR 0.98 08/19/2010       Stroke workup so far:  CTH: Diffuse cerebral volume loss and cerebral white matter  changes consistent with chronic small vessel ischemic disease. No  evidence for acute intracranial pathology.   MRI: No acute abnormality. No evidence of infarct.  Echo Bubble Study: Left ventricle is normal in structure, function and size. The visual  EF is estimated at 60%. Right ventricle is normal in structure, function and size. No atrial shunt seen. Bubble Study was negative for flow across the interatrial septum. Mild to moderate (1-2+) mitral regurgitation.  LDL: 79    Assessment and Plan   Lisa Damon is an 83 year old female with h/o hypothyroidism, peripheral neuropathy, and Reynauds syndrome who presented 1/14/2019 after an episode of confusion, LUE weakness, headache, and blurry vision concerning for TIA. Laboratory and imaging workup negative. The patient seems to be back at baseline today and has no significant findings on exam.    #TIA  -Lipitor  -ASA 81mg daily    #Follow up  - PCP in 1-2 weeks  - Neurology in 4-6 weeks    DVT ppx: SCDs  Diet: Regular diet  Code status: DNR/DNI  Disposition: Ok for discharge today per neurology.    Please call us with any questions or concerns, patient was seen and discussed with Dr. Kaplan.    Chance Oviedo, MS3

## 2019-01-15 NOTE — PLAN OF CARE
"Pt AO, VSS on RA. Denies pain ex intermittent R temporal headache rated at \"2.\" Tele: NSR. Neuros intact ex R field cut (baseline per pt) and baseline BLE numbness. IVF running. Down to MRI this adria. Neuro saw pt. Passed bedside swallow. States she feels dizzy getting up. A2, walker/GB. Regular diet. Continue to monitor.  "

## 2019-01-15 NOTE — PLAN OF CARE
Discharge Planner OT   Patient plan for discharge: see PT note  Current status: Orders rec'd and chart reviewed. Pt admitted under observation status with AMS, L sided weakness, blurry vision, thought to have had a TIA. Prior to admit pt was living alone in a house, uses no AD and is independent with mobility. Spoke with PT, pt at baseline with ADL's, recommending home PT. No OT needs warranted and will complete OT orders.   Barriers to return to prior living situation: per chart, falls risk  Recommendations for discharge: home, see PT note for recommendations.   Rationale for recommendations: see PT note       Entered by: Olya Sherman 01/15/2019 11:35 AM        No

## 2019-01-15 NOTE — PLAN OF CARE
Patient has been discharged January 15, 2019 1:12 PM. Discharge instructions and education reviewed, medication schedule and follow up appts discussed. Pt had no questions. All belongings sent with pt. Stroke education reviewed. Stroke booklet sent with patient. Pt and daughters verbalized understanding. Will  prescriptions from local pharmacy. Discharged with event monitor on.

## 2019-01-15 NOTE — PLAN OF CARE
Discharge Planner SLP   Patient plan for discharge: Unknown  Current status: Epic notes were reviewed, and RN was consulted this am.  Patient passed the nursing swallow screen with no concerns.  No current speech-language concerns are present.  Plan to defer SLP eval and Tx given no swallow or speech-language concerns.    Barriers to return to prior living situation: No SLP barriers  Recommendations for discharge: Home, no SLP needs currently  Rationale for recommendations: No swallow or speech-language concerns       Entered by: Kari Treadwell 01/15/2019 8:13 AM

## 2019-01-15 NOTE — PLAN OF CARE
Observation goals PRIOR TO DISCHARGE     Comments: -diagnostic tests and consults completed and resulted - not met  -vital signs normal or at patient baseline - met  -tolerating oral intake to maintain hydration - partially met, on IV fluids  -returns to baseline functional status - not met  -safe disposition plan has been identified - not met  Nurse to notify provider when observation goals have been met and patient is ready for discharge.

## 2019-01-15 NOTE — PLAN OF CARE
PT:  Discharge Planner PT   Patient plan for discharge: Return home  Current status: Orders received, eval completed, treatment initiated. Pt admitted under observation status with AMS, L sided weakness, blurry vision, thought to have had a TIA. Prior to admit pt was living alone in a house, uses no AD and is independent with mobility. Currently requires SBA for sit to/from stand, CGA for gait of 150 ft with IV pole with heavy reliance on it for support and was independent with bed mobility. Pt in agreement to use a walker and take one home, family in strong agreement. Pt refused trying one here as she was too tired and did not want to walk again. Pt demonstrates dec balance and activity tolerance and difficulty ambulating and transferring and would benefit from skilled PT services in order to improve this.  Barriers to return to prior living situation: Falls risk  Recommendations for discharge: Return home with use of walker and home PT for balance improvement  Rationale for recommendations: Pt would benefit from continued PT to improve balance, mobility to increase independence, reduce falls risk and increase safety at home.       Entered by: Joselyn Hugo 01/15/2019 10:47 AM

## 2019-01-16 LAB
BACTERIA SPEC CULT: NORMAL
Lab: NORMAL
SPECIMEN SOURCE: NORMAL

## 2019-01-16 NOTE — DISCHARGE SUMMARY
[facility]    Discharge Summary  Hospitalist    Date of Admission:  1/14/2019  Date of Discharge:  1/15/2019  1:22 PM  Discharging Provider: Anabelle Lucas PA-C    Discharge Diagnoses   Transient ischemic attack  Hypothyroidism  Peripheral neuropathy   Raynaud syndrome    History of Present Illness   Lisa Damon is an 83 year old female with a past medical history significant for hypothyroidism, peripheral neuropathy, Raynaud's syndrome, scoliosis and prior cholesteatoma excision who presented to [facility] Emergency Department due to altered mental status/confusion with blurred vision and headache. The patient reported she awoke the day of admission with blurred vision and pressure behind both eyes. She noted some associated dizziness and weakness of her RUE as well. She reports her vision was very limited but she was able to call one of her daughters who states she sounded confused over the phone. EMS was called and patient was brought to the Emergency Department for evaluation.     On day of discharge patient reports feeling fine. Patient and family members agree she is back to baseline. She denies any new weakness. Denies numbness or tingling abnormal from her typical neuropathy symptoms. Vision is back to baseline. She denies chest pain, shortness of breath, nausea, dizziness, dysarthria, or difficulty swallowing.     Hospital Course   Lisa Damon was admitted on 1/14/2019.  The following problems were addressed during her hospitalization:    Transient ischemic attack. Patient presents with confusion, blurred vision, headache, and LUE weakness. Symptoms resolved on admission with the exception of mild dizziness. Head CT, MRI, MRA brain negative for significant stenosis or acute pathology, some findings of small small vessel disease. Inflammatory markers WNL. She was started on aspirin and statin on admission. Telemetry evaluation showed sinus rhythm through admission.  ECHO with negative bubble study. She was evaluated by Neurology who recommended plavix load with 3 week course at discharge along with 81mg aspirin daily. After completion she will continue on baby aspirin alone. She was started on Atorvastatin 10mg daily. She will discharge with 30 day event monitor and Neurology follow up in 4-6 weeks. She was evaluated by PT prior to discharge who recommended she return home with use of a walker.      Hypothyroidism. TSH 0.18 with free T4 of 1.72. On levothyroxine 100mcg PTA; decrease to 75 mcg daily at discharge with recheck TSH in 6 weeks.     Idiopathic peripheral neuropathy. Tylenol prn    This patient was seen and examined with Dr. Kebede who agrees with the above plan.    Anabelle Lucas PA-C    Significant Results and Procedures   See below     Pending Results     Unresulted Labs Ordered in the Past 30 Days of this Admission     Date and Time Order Name Status Description    1/14/2019 0909 T4 free In process           Code Status   DNR / DNI       Primary Care Physician   Pb Andrews    Physical Exam                      Vitals:    01/14/19 0831   Weight: 63.5 kg (140 lb)     Vital Signs with Ranges     I/O last 3 completed shifts:  In: -   Out: 500 [Urine:500]    Constitutional: Alert and oriented, sitting up in bed. Appears comfortable and is pleasantly conversant   ENT: normal cephalic, moist mucous membranes  Respiratory: Lungs clear to auscultation bilaterally, no increased work of breathing or wheezing  Cardiovascular: Regular rate and rhythm, no murmur, no LE edema, distal pulses +2/4  GI:  active bowel sounds, abdomen soft, non-tender  Skin/Integumen: warm, dry  MSK:  Moves all four extremities. Strength +5/5 and equal bilaterally.   Neuro:  Cranial nerves 2-12 grossly intact. No focal deficits. Speech is clear. Baseline neuropathy       Discharge Disposition   Discharged to home  Condition at discharge: Stable    Consultations This Hospital Stay    NEUROLOGY IP CONSULT  PHYSICAL THERAPY ADULT IP CONSULT  OCCUPATIONAL THERAPY ADULT IP CONSULT  SPEECH LANGUAGE PATH ADULT IP CONSULT  SWALLOW EVAL SPEECH PATH AT BEDSIDE IP CONSULT  SMOKING CESSATION PROGRAM IP CONSULT  ADVANCE DIRECTIVE IP CONSULT    Time Spent on this Encounter   I, Anabelle Lucas, personally saw the patient today and spent greater than 30 minutes discharging this patient.    Discharge Orders      Reason for your hospital stay    You were here for evaluation of visual changes, altered mental status. We believe your symptoms may represent a transient ischemic attack or TIA     Follow-up and recommended labs and tests     Follow up with primary care provider in 1-2 weeks for hospital follow up. You will also need to recheck thyroid function tests in 6 weeks.     Follow up with Neurology in 4-6 weeks for hospital follow up. You can call [facility] at [phone] to schedule this. You can see any provider.     Activity    Your activity upon discharge: activity as tolerated     Discharge Instructions    Avoid NSAID medications while you are taking aspirin and plavix. These include naproxen, aleve, ibuprofen, advil. Tylenol is ok. Take aspirin with food.     We have changed the dose of your levothyroxine; you will need your thyroid labs rechecked in 6 weeks     DNR/DNI     Cardiac Event Monitor Adult Pediatric    Results should be sent to primary care provider     Diet    Follow this diet upon discharge: Orders Placed This Encounter      Regular Diet Adult     Discharge Medications   Discharge Medication List as of 1/15/2019 12:07 PM      START taking these medications    Details   aspirin (ASA) 81 MG chewable tablet Take 1 tablet (81 mg) by mouth daily, Disp-30 tablet, R-0, E-Prescribe      atorvastatin (LIPITOR) 10 MG tablet 1 tablet (10 mg) by Oral or NG Tube route daily, Disp-30 tablet, R-0, E-Prescribe      clopidogrel (PLAVIX) 75 MG tablet Take 1 tablet (75 mg) by  mouth daily for 21 days, Disp-21 tablet, R-0, E-Prescribe      order for DME Equipment being ordered: Walker Wheels () and Walker ()  Treatment Diagnosis: Difficulty ambulatingDisp-1 each, R-0, Local Print         CONTINUE these medications which have CHANGED    Details   levothyroxine (SYNTHROID/LEVOTHROID) 75 MCG tablet Take 1 tablet (75 mcg) by mouth daily, Disp-30 tablet, R-0, E-Prescribe         CONTINUE these medications which have NOT CHANGED    Details   calcium-vitamin D 500-125 MG-UNIT TABS Take 1 tablet by mouth daily , Historical      multivitamin, therapeutic with minerals (MULTI-VITAMIN) TABS tablet Take 1 tablet by mouth daily, Historical         STOP taking these medications       Naproxen Sodium (ALEVE PO) Comments:   Reason for Stopping:             Allergies   No Known Allergies  Data   Most Recent 3 CBC's:  Recent Labs   Lab Test 01/14/19  1650 01/14/19  0909   WBC 8.1 6.4   HGB 11.8 12.0   MCV 90 89    335      Most Recent 3 BMP's:  Recent Labs   Lab Test 01/14/19  1650 01/14/19  0909    137   POTASSIUM 4.3 4.3   CHLORIDE 103 102   CO2 24 27   BUN 15 19   CR 0.80 0.79   ANIONGAP 9 8   KIMBERLY 8.9 9.2   GLC 85 114*     Most Recent 2 LFT's:  Recent Labs   Lab Test 01/14/19  1650 01/14/19  0909   AST 17 19   ALT 17 18   ALKPHOS 72 77   BILITOTAL 0.4 0.4     Most Recent INR's and Anticoagulation Dosing History:  Anticoagulation Dose History     Recent Dosing and Labs Latest Ref Rng & Units 7/23/2009 8/19/2010    INR 0.86 - 1.14 1.01 0.98        Most Recent 3 Troponin's:  Recent Labs   Lab Test 01/14/19  2350 01/14/19  1650 01/14/19  0909   TROPI 0.016 <0.015 <0.015     Most Recent Cholesterol Panel:  Recent Labs   Lab Test 01/14/19  1650   CHOL 195   LDL 79      TRIG 59     Most Recent 6 Bacteria Isolates From Any Culture (See EPIC Reports for Culture Details):  Recent Labs   Lab Test 01/14/19  1230   CULT No Beta Streptococcus isolated     Most Recent TSH, T4 and A1c  Labs:  Recent Labs   Lab Test 01/14/19  1650 01/14/19  0909   TSH  --  0.18*   T4  --  1.72*   A1C 5.4  --      Results for orders placed or performed during the hospital encounter of 01/14/19   CT Head w/o Contrast    Narrative    CT OF THE HEAD WITHOUT CONTRAST January 14, 2019 9:35 AM     HISTORY: Altered level of consciousness (LOC), unexplained.    TECHNIQUE: 5 mm thick axial CT images of the head were acquired  without IV contrast material. Radiation dose for this scan was reduced  using automated exposure control, adjustment of the mA and/or kV  according to patient size, or iterative reconstruction technique.    COMPARISON: None.    FINDINGS: There is mild diffuse cerebral volume loss. There are subtle  patchy areas of decreased density in the cerebral white matter  bilaterally that are consistent with sequela of chronic small vessel  ischemic disease.     The ventricles and basal cisterns are within normal limits in  configuration given the degree of cerebral volume loss. There is no  midline shift. There are no extra-axial fluid collections.     No intracranial hemorrhage, mass or recent infarct.    The visualized paranasal sinuses are well-aerated. There is no  mastoiditis. There are no fractures of the visualized bones.       Impression    IMPRESSION: Diffuse cerebral volume loss and cerebral white matter  changes consistent with chronic small vessel ischemic disease. No  evidence for acute intracranial pathology.             JASE VEGA MD   MRI Brain w & w/o contrast    Narrative    MRI BRAIN WITHOUT AND WITH CONTRAST  1/14/2019 9:24 PM    HISTORY:  Transient ischemic attack. Left-sided weakness. Stroke  follow-up.     TECHNIQUE:  Multiplanar, multisequence MRI of the brain without and  with 10 mL Gadavist     COMPARISON: CT head today.    FINDINGS:  There is generalized atrophy of the brain. White matter T2  hyperintensities are seen in the cerebral hemispheres consistent with  sequelae of small  vessel ischemic disease.  There is no evidence of  hemorrhage, mass, acute infarct, or anomaly.  There are no gadolinium  enhancing lesions.    There are bilateral intraocular lens implants. The globes are somewhat  misshapen. The arteries at the base of the brain and the dural venous  sinuses appear patent.       Impression    IMPRESSION:  1. No acute abnormality. No evidence of infarct.  2. There is generalized atrophy of the brain.  White matter changes  are present in the cerebral hemispheres that are consistent with small  vessel ischemic disease in this age patient.      WANDA REVEES MD   MRA Brain ([place]) wo Contrast    Narrative    MR ANGIOGRAM OF THE HEAD WITHOUT CONTRAST   1/14/2019 9:26 PM     HISTORY: Stroke, follow up; TIA with left sided weakness    TECHNIQUE:  3D time-of-flight MR angiogram of the head without  contrast.    COMPARISON: None.    FINDINGS:  The visualized portions of the distal internal carotid and  vertebral arteries, the basilar artery, [place], and the  proximal anterior, middle and posterior cerebral arteries all appear  normal.  There is no evidence of aneurysm or vascular stenosis or  occlusion.      Impression    IMPRESSION:  Normal MR angiogram of the head.      WANDA REEVES MD   MRA Neck (Carotids) wo Contrast    Narrative    MRA NECK WITHOUT CONTRAST  1/14/2019 9:26 PM     HISTORY: Stroke, follow-up. Transient ischemic attack with left-sided  weakness.    TECHNIQUE: 2D time-of-flight MR angiogram of the neck without  contrast. Estimates of carotid stenoses are made relative to the  distal internal carotid artery diameters except as noted.    COMPARISON: None.    FINDINGS:    Right Carotid: Tortuous common and internal carotid arteries. No  stenosis.    Left Carotid:  Tortuous common and internal carotid arteries. No  stenosis.    Vertebral and Basilar:  Tortuous vessels, no stenosis.      Impression    IMPRESSION:  Tortuous vessels, no  stenosis.      WANDA REEVES MD

## 2019-02-15 ENCOUNTER — TRANSFERRED RECORDS (OUTPATIENT)
Dept: HEALTH INFORMATION MANAGEMENT | Facility: CLINIC | Age: 84
End: 2019-02-15

## 2019-02-26 ENCOUNTER — MEDICAL CORRESPONDENCE (OUTPATIENT)
Dept: HEALTH INFORMATION MANAGEMENT | Facility: CLINIC | Age: 84
End: 2019-02-26

## 2019-04-17 NOTE — LETTER
4/17/2019      Pb Andrews MD  [address]  [facility] [address]      RE: Lisa Damon       Dear Colleague,    I had the pleasure of seeing Lisa M Marisol in the [facility].    Service Date: 04/17/2019      LOCATION:  [facility], [place], [facility].      PRIMARY CARE AND REFERRING PROVIDER:  Dr. Pb Andrews, [place] [address].      REASON FOR VISIT:   1.  Abnormal event monitor results in a patient with a recent presumed transient ischemic attack.      HISTORY OF PRESENT ILLNESS:     Lisa is new to Cardiology.  She was accompanied by her daughter, Ashley, who works at [employer] in the operating room in the Purchasing Department.  Lisa is a tall and frail-appearing 83-year-old lady who appears older than her stated age.  At baseline she has been in generally stable health and is on treatment for treated hypothyroidism and low-dose statin for hyperlipidemia.  Her BMI is 18.5, and she is physically frail-appearing and kyphotic.  She is a former smoker who quit in 1965.  She is not known to have hypertension or diabetes.      The patient was hospitalized in mid January (01/14-01/15) with symptoms of a transient ischemic attack.  Typically Lisa is independent, self-caring and lives on her own.  One morning, she woke up and had significant blurred vision.  Her vision was so bad that she could barely see the numbers on the phone to dial 911.  She somehow got in touch with her daughters, but when they arrived, Ashley reports that her mother was disoriented and confused.  Her workup in the hospital showed negative blood work, her head CT, MRI and MRA were negative for stroke or significant carotid artery stenoses.  She had an echocardiogram, and her echocardiogram showed normal LVEF of 60%-65% and the bubble study was negative for interatrial shunt.  She was discharged on dual antiplatelet therapy with aspirin and clopidogrel and a 30-day event  monitor.  The only abnormality on her blood was her TSH was suppressed at 0.18 with a normal free T4 of 1.7, therefore her levothyroxine was decreased to 75 mcg.      Since being home, she reports being back to baseline.      However, her 30-day event monitor showed brief runs of a narrow complex supraventricular tachycardia with PAC at a rate of about 180 BPM and also a few runs of nonsustained ventricular tachycardia, monomorphic ventricular tachycardia with the longest being about 15 beats at 150 BPM (sinus rhythm triggered by a PVC).  These were all automatic triggers and there were no patient-reported events.      This led to this Cardiology consultation.  The patient has not had any known cardiac diagnoses in the past.      DIAGNOSTIC DATA:     Labs -- as above.  Hemoglobin 11.8, potassium 4.3, creatinine 0.8 with an estimated GFR of 70, satisfactory lipid panel with an LDL of 79 (on 10 mg of atorvastatin).      Event monitor rhythms reviewed.  As above.      ECG done today shows normal sinus rhythm of 72 BPM, normal cardiac intervals with a NY of 158 milliseconds and QTc of 405 milliseconds.      Transthoracic echocardiogram -- I personally reviewed the images.  Her LV is normal size with an EF of 60%-65%.  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  Normal right ventricular size and systolic function, negative bubble study for interatrial shunt.      PHYSICAL EXAMINATION:   VITAL SIGNS:  /64, pulse 80 per minute and regular, height 1.7 meters (5 feet 8 inches), weight 55.2 kg (121 pounds), BMI 18.5 kg/m2.   GENERAL:  This is a tall, frail-appearing elderly lady who is kyphotic but mentally alert and oriented with mild hearing impairment.   CARDIOVASCULAR:  Apical impulse is undisplaced.  Heart sounds are regular.  She has a very soft murmur of poor mild mitral regurgitation.  No  vascular bruit.   ABDOMEN:  Soft, nontender.   LUNGS:  Clear.   EXTREMITIES:  No lower extremity edema.      DIAGNOSES:   1.  Recent transient ischemic attack with negative imaging of brain arteries and brain.   2.  Asymptomatic nonsustained ventricular tachycardia on recent event monitor.   3.  Mitral valve prolapse with mild to moderate mitral regurgitation.   4.  Treated hypothyroidism.      ASSESSMENT AND PLAN:  The patient is essentially here after the finding of nonsustained VT on her event monitor, during which she was asymptomatic.  I am not sure whether this has any clinical significance in her.  She has never had any coronary disease, her LV biventricular systolic function is normal, and she has no symptoms related to any arrhythmias.  Her symptoms are consistent with a TIA, which would make the finding of atrial fibrillation more relevant, but that has not been borne out on her event monitor.      As I explained to the patient, I am not sure what to make of this nonsustained VT.  Therefore, I am going to elicit the help of my heart rhythm specialist colleagues.  Also, on her echocardiogram she has posterior mitral leaflet prolapse with some mild disjunction.  It is increasingly recognized that this variant of mitral valve prolapse with annular disjunction can be associated with frequent PVCs that can potentially become malignant.  Personally, I do not know enough on this topic to advise the patient one or the other and would be grateful for a subspecialty opinion.      It is pertinent to note that the patient herself is inclined to have as little done as possible unless it is going to significantly impact her lifestyle.  She is a very reasonable patient with good insight into her lifestyle and medical issues.      1.  Referred to Heart Rhythm services.   2.  I have not scheduled a regular followup with myself at this point.      It was my pleasure to visit with this nice lady.  I wish her all the best.          CONSUELO MEDELLIN MD             D: 2019   T: 2019   MT: BRITT      Name:     CUONG GARCIA   MRN:      [id number]        Account:      [id number]   :      1935           Service Date: 2019      Document: [id number]         Outpatient Encounter Medications as of 2019   Medication Sig Dispense Refill     calcium-vitamin D 500-125 MG-UNIT TABS Take 1 tablet by mouth daily        levothyroxine (SYNTHROID/LEVOTHROID) 75 MCG tablet Take 1 tablet (75 mcg) by mouth daily 30 tablet 0     multivitamin, therapeutic with minerals (MULTI-VITAMIN) TABS tablet Take 1 tablet by mouth daily       order for DME Equipment being ordered: Walker Wheels () and Walker ()  Treatment Diagnosis: Difficulty ambulating 1 each 0     atorvastatin (LIPITOR) 10 MG tablet 1 tablet (10 mg) by Oral or NG Tube route daily 30 tablet 0     [DISCONTINUED] aspirin (ASA) 81 MG chewable tablet Take 1 tablet (81 mg) by mouth daily 30 tablet 0     [DISCONTINUED] clopidogrel (PLAVIX) 75 MG tablet Take 1 tablet (75 mg) by mouth daily for 21 days 21 tablet 0     No facility-administered encounter medications on file as of 2019.        Again, thank you for allowing me to participate in the care of your patient.      Sincerely,    Consuelo Medellin MD     [facility]

## 2019-04-17 NOTE — PATIENT INSTRUCTIONS
1.  Visit with Dr. Mustafa and heart rhythm services, for a second opinion about your heart arrhythmia.    If you have any questions or concerns, please contact my nurses at [phone].

## 2019-04-17 NOTE — PROGRESS NOTES
Clinic visit note dictated. Dictation reference number - [id number]            REVIEW OF SYSTEMS:  A comprehensive 10-point review of systems was completed and the pertinent positives are documented in the history of present illness.    Skin:  Positive for bruising   Eyes:  Positive for glasses  ENT:  Negative    Respiratory:  Negative    Cardiovascular:  Negative    Gastroenterology: Negative    Genitourinary:  Negative    Musculoskeletal:  Positive for back pain  Neurologic:  Negative    Psychiatric:  Positive for anxiety;depression;sleep disturbances;excessive stress  Heme/Lymph/Imm:  Negative    Endocrine:  Positive for thyroid disorder    CURRENT MEDICATIONS:  Current Outpatient Medications   Medication Sig Dispense Refill     calcium-vitamin D 500-125 MG-UNIT TABS Take 1 tablet by mouth daily        levothyroxine (SYNTHROID/LEVOTHROID) 75 MCG tablet Take 1 tablet (75 mcg) by mouth daily 30 tablet 0     multivitamin, therapeutic with minerals (MULTI-VITAMIN) TABS tablet Take 1 tablet by mouth daily       order for DME Equipment being ordered: Walker Wheels () and Walker ()  Treatment Diagnosis: Difficulty ambulating 1 each 0     atorvastatin (LIPITOR) 10 MG tablet 1 tablet (10 mg) by Oral or NG Tube route daily 30 tablet 0         ALLERGIES:  No Known Allergies    PAST MEDICAL HISTORY:    Past Medical History:   Diagnosis Date     Arthritis      Cholesteatoma      Hypothyroidism 3/25/2019     Peripheral neuropathy      Peripheral neuropathy 3/25/2019     Raynaud's syndrome      Scoliosis 3/25/2019     Scoliosis (and kyphoscoliosis), idiopathic      Unspecified hypothyroidism        PAST SURGICAL HISTORY:    Past Surgical History:   Procedure Laterality Date     BACK SURGERY      L4-5 FUSION     COLONOSCOPY N/A 10/6/2016    Procedure: COLONOSCOPY;  Surgeon: Cal Timmons MD;  Location:  GI     EXAM UNDER ANESTHESIA EAR(S) Right 8/6/2015    Procedure: EXAM UNDER ANESTHESIA EAR(S);  Surgeon:  "Wan Suh MD;  Location: [facility]     GYN SURGERY      HYSTERECTOMY     L4-5      DISKECTOMY     ORTHOPEDIC SURGERY      BILATERAL SHOULDER ARTHOPLASTY     TONSILLECTOMY       TYMPANOPLASTY Right 2015    Procedure: TYMPANOPLASTY;  Surgeon: Wan Suh MD;  Location: [facility]     TYMPANOPLASTY Right 2017    Procedure: TYMPANOPLASTY;  RIGHT TYMPANOPLASTY, REVISION PORP;  Surgeon: Wan Suh MD;  Location: [facility]       FAMILY HISTORY:    History reviewed. No pertinent family history.    SOCIAL HISTORY:    Social History     Socioeconomic History     Marital status: Single     Spouse name: None     Number of children: None     Years of education: None     Highest education level: None   Occupational History     None   Social Needs     Financial resource strain: None     Food insecurity:     Worry: None     Inability: None     Transportation needs:     Medical: None     Non-medical: None   Tobacco Use     Smoking status: Former Smoker     Types: Cigarettes     Last attempt to quit: 1965     Years since quittin.3     Smokeless tobacco: Never Used   Substance and Sexual Activity     Alcohol use: No     Drug use: No     Sexual activity: None   Lifestyle     Physical activity:     Days per week: None     Minutes per session: None     Stress: None   Relationships     Social connections:     Talks on phone: None     Gets together: None     Attends Anglican service: None     Active member of club or organization: None     Attends meetings of clubs or organizations: None     Relationship status: None     Intimate partner violence:     Fear of current or ex partner: None     Emotionally abused: None     Physically abused: None     Forced sexual activity: None   Other Topics Concern     Parent/sibling w/ CABG, MI or angioplasty before 65F 55M? Not Asked   Social History Narrative     None         Vitals: /64   Pulse 80   Ht 1.727 m (5' 8\")   Wt 55.2 kg (121 lb 11.2 oz)   SpO2 99%   BMI " 18.50 kg/m    Wt Readings from Last 5 Encounters:   04/17/19 55.2 kg (121 lb 11.2 oz)   01/14/19 63.5 kg (140 lb)   11/16/17 59 kg (130 lb)   10/06/16 63.5 kg (140 lb)   08/06/15 73.2 kg (161 lb 4.8 oz)

## 2019-04-17 NOTE — LETTER
4/17/2019    Pb Andrews MD  [address] [facility] [address]  [facility] [address]    RE: Lisa Damon       Dear Colleague,    I had the pleasure of seeing Lisa Damon in the [facility].    Clinic visit note dictated. Dictation reference number - [id number]            REVIEW OF SYSTEMS:  A comprehensive 10-point review of systems was completed and the pertinent positives are documented in the history of present illness.    Skin:  Positive for bruising   Eyes:  Positive for glasses  ENT:  Negative    Respiratory:  Negative    Cardiovascular:  Negative    Gastroenterology: Negative    Genitourinary:  Negative    Musculoskeletal:  Positive for back pain  Neurologic:  Negative    Psychiatric:  Positive for anxiety;depression;sleep disturbances;excessive stress  Heme/Lymph/Imm:  Negative    Endocrine:  Positive for thyroid disorder    CURRENT MEDICATIONS:  Current Outpatient Medications   Medication Sig Dispense Refill     calcium-vitamin D 500-125 MG-UNIT TABS Take 1 tablet by mouth daily        levothyroxine (SYNTHROID/LEVOTHROID) 75 MCG tablet Take 1 tablet (75 mcg) by mouth daily 30 tablet 0     multivitamin, therapeutic with minerals (MULTI-VITAMIN) TABS tablet Take 1 tablet by mouth daily       order for DME Equipment being ordered: Walker Wheels () and Walker ()  Treatment Diagnosis: Difficulty ambulating 1 each 0     atorvastatin (LIPITOR) 10 MG tablet 1 tablet (10 mg) by Oral or NG Tube route daily 30 tablet 0         ALLERGIES:  No Known Allergies    PAST MEDICAL HISTORY:    Past Medical History:   Diagnosis Date     Arthritis      Cholesteatoma      Hypothyroidism 3/25/2019     Peripheral neuropathy      Peripheral neuropathy 3/25/2019     Raynaud's syndrome      Scoliosis 3/25/2019     Scoliosis (and kyphoscoliosis), idiopathic      Unspecified hypothyroidism        PAST SURGICAL HISTORY:    Past Surgical History:   Procedure Laterality Date     BACK SURGERY       L4-5 FUSION     COLONOSCOPY N/A 10/6/2016    Procedure: COLONOSCOPY;  Surgeon: Cal Timmnos MD;  Location:  GI     EXAM UNDER ANESTHESIA EAR(S) Right 2015    Procedure: EXAM UNDER ANESTHESIA EAR(S);  Surgeon: Wan Suh MD;  Location: [facility]     GYN SURGERY      HYSTERECTOMY     L4-5      DISKECTOMY     ORTHOPEDIC SURGERY      BILATERAL SHOULDER ARTHOPLASTY     TONSILLECTOMY       TYMPANOPLASTY Right 2015    Procedure: TYMPANOPLASTY;  Surgeon: Wan Suh MD;  Location: [facility]     TYMPANOPLASTY Right 2017    Procedure: TYMPANOPLASTY;  RIGHT TYMPANOPLASTY, REVISION PORP;  Surgeon: Wan Suh MD;  Location: [facility]       FAMILY HISTORY:    History reviewed. No pertinent family history.    SOCIAL HISTORY:    Social History     Socioeconomic History     Marital status: Single     Spouse name: None     Number of children: None     Years of education: None     Highest education level: None   Occupational History     None   Social Needs     Financial resource strain: None     Food insecurity:     Worry: None     Inability: None     Transportation needs:     Medical: None     Non-medical: None   Tobacco Use     Smoking status: Former Smoker     Types: Cigarettes     Last attempt to quit: 1965     Years since quittin.3     Smokeless tobacco: Never Used   Substance and Sexual Activity     Alcohol use: No     Drug use: No     Sexual activity: None   Lifestyle     Physical activity:     Days per week: None     Minutes per session: None     Stress: None   Relationships     Social connections:     Talks on phone: None     Gets together: None     Attends Buddhism service: None     Active member of club or organization: None     Attends meetings of clubs or organizations: None     Relationship status: None     Intimate partner violence:     Fear of current or ex partner: None     Emotionally abused: None     Physically abused: None     Forced sexual activity: None   Other Topics  "Concern     Parent/sibling w/ CABG, MI or angioplasty before 65F 55M? Not Asked   Social History Narrative     None         Vitals: /64   Pulse 80   Ht 1.727 m (5' 8\")   Wt 55.2 kg (121 lb 11.2 oz)   SpO2 99%   BMI 18.50 kg/m     Wt Readings from Last 5 Encounters:   04/17/19 55.2 kg (121 lb 11.2 oz)   01/14/19 63.5 kg (140 lb)   11/16/17 59 kg (130 lb)   10/06/16 63.5 kg (140 lb)   08/06/15 73.2 kg (161 lb 4.8 oz)       Thank you for allowing me to participate in the care of your patient.      Sincerely,     Consuelo Medellin MD     [facility]    cc:   Pb Andrews MD  [address] [facility] [address]  [place] [address]        "

## 2019-04-17 NOTE — PROGRESS NOTES
Service Date: 04/17/2019      LOCATION:  [facility], [facility].      PRIMARY CARE AND REFERRING PROVIDER:  Dr. Pb Andrews, [place] [address].      REASON FOR VISIT:   1.  Abnormal event monitor results in a patient with a recent presumed transient ischemic attack.      HISTORY OF PRESENT ILLNESS:     Lisa is new to Cardiology.  She was accompanied by her daughter, Ashley, who works at [employer] in the operating room in the Purchasing Department.  Lisa is a tall and frail-appearing 83-year-old lady who appears older than her stated age.  At baseline she has been in generally stable health and is on treatment for treated hypothyroidism and low-dose statin for hyperlipidemia.  Her BMI is 18.5, and she is physically frail-appearing and kyphotic.  She is a former smoker who quit in 1965.  She is not known to have hypertension or diabetes.      The patient was hospitalized in mid January (01/14-01/15) with symptoms of a transient ischemic attack.  Typically Lisa is independent, self-caring and lives on her own.  One morning, she woke up and had significant blurred vision.  Her vision was so bad that she could barely see the numbers on the phone to dial 911.  She somehow got in touch with her daughters, but when they arrived, Ashley reports that her mother was disoriented and confused.  Her workup in the hospital showed negative blood work, her head CT, MRI and MRA were negative for stroke or significant carotid artery stenoses.  She had an echocardiogram, and her echocardiogram showed normal LVEF of 60%-65% and the bubble study was negative for interatrial shunt.  She was discharged on dual antiplatelet therapy with aspirin and clopidogrel and a 30-day event monitor.  The only abnormality on her blood was her TSH was suppressed at 0.18 with a normal free T4 of 1.7, therefore her levothyroxine was decreased to 75 mcg.      Since being home, she reports being back to baseline.      However,  her 30-day event monitor showed brief runs of a narrow complex supraventricular tachycardia with PAC at a rate of about 180 BPM and also a few runs of nonsustained ventricular tachycardia, monomorphic ventricular tachycardia with the longest being about 15 beats at 150 BPM (sinus rhythm triggered by a PVC).  These were all automatic triggers and there were no patient-reported events. Leading to cardiology consult. She does not have any known cardiac diagnoses in the past.      DIAGNOSTIC DATA:     Labs -- as above.  Hemoglobin 11.8, potassium 4.3, creatinine 0.8 with an estimated GFR of 70, satisfactory lipid panel with an LDL of 79 (on 10 mg of atorvastatin).      Event monitor rhythms reviewed.  As above.      ECG done today shows normal sinus rhythm of 72 BPM, normal cardiac intervals with a AR of 158 milliseconds and QTc of 405 milliseconds.      Transthoracic echocardiogram -- I personally reviewed the images.  Her LV is normal size with an EF of 60%-65%.  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  Normal right ventricular size and systolic function, negative bubble study for interatrial shunt.      PHYSICAL EXAMINATION:   VITAL SIGNS:  /64, pulse 80 per minute and regular, height 1.7 meters (5 feet 8 inches), weight 55.2 kg (121 pounds), BMI 18.5 kg/m2.   GENERAL:  This is a tall, frail-appearing elderly lady who is kyphotic but mentally alert and oriented with mild hearing impairment.   CARDIOVASCULAR:  Apical impulse is undisplaced.  Heart sounds are regular.  She has a very soft murmur of poor mild mitral regurgitation.  No vascular bruit.   ABDOMEN:  Soft, nontender.   LUNGS:  Clear.   EXTREMITIES:  No lower extremity edema.      DIAGNOSES:   1.  Recent transient ischemic attack with negative imaging of brain arteries and brain.   2.  Asymptomatic nonsustained ventricular  tachycardia on recent event monitor.   3.  Mitral valve prolapse with mild to moderate mitral regurgitation.   4.  Treated hypothyroidism.      ASSESSMENT AND PLAN:    The patient is essentially here after the finding of nonsustained VT on her event monitor, during which she was asymptomatic.  I am not sure whether this has any clinical significance in her.  She has never had any coronary disease, her LV biventricular systolic function is normal, and she has no symptoms related to any arrhythmias.  Her symptoms are consistent with a TIA, which would make the finding of atrial fibrillation more relevant, but that has not been borne out on her event monitor.      As I explained to the patient, I am not sure what to make of this nonsustained VT.  Therefore, I am going to elicit the help of my heart rhythm specialist colleagues.  Also, on her echocardiogram she has posterior mitral leaflet prolapse with mild annular disjunction.  It is increasingly recognized that this variant of mitral valve prolapse with annular disjunction can be associated with frequent PVCs that can potentially become malignant.  Personally, I do not know enough on this topic to advise the patient and would be grateful for a subspecialty opinion.      It is pertinent to note that the patient herself is inclined to have as little done as possible unless it is going to significantly impact her lifestyle.  She is a very reasonable patient with good insight into her lifestyle and medical issues.      1.  Referred to Heart Rhythm services.   2.  I have not scheduled a regular followup with myself at this point.      It was my pleasure to visit with this nice lady.  I wish her all the best.         DELILAH WALLACE MD             D: 2019   T: 2019   MT: BRITT      Name:     CUONG GARCIA   MRN:      [id number]        Account:      [id number]   :      1935           Service Date: 2019      Document: [id number]

## 2019-05-31 NOTE — PROGRESS NOTES
Service Date: 05/31/2019        HISTORY OF PRESENT ILLNESS:    It is my pleasure to see Ms. Lisa Damon, an 83-year-old female who has been referred to EP by Dr. Medellin for evaluation of nonsustained VT.  Her history is well summarized in Julieta's note from 04/2019.  Ms. Damon was hospitalized in mid January with TIA symptoms.  Hospitalization and work-up did not reveal an obvious source.  She was sent home with a 30-day cardiac event monitor.  On the monitor, she had y63-ohso run of SVT and 2 runs of VT (a 4-beat run and a 15-beat run).  The patient was asymptomatic.      The patient was subsequently referred to Dr. Medellin for assessment for NSVT.  She tells me today that she has never fainted.  She is limited physically because of severe peripheral neuropathy that affects her balance and mobility.      She does not have chest pain, though she does not become vigorously active due to her moving limitations.  She is a nonsmoker, nondrinker.      PHYSICAL EXAMINATION:   VITAL SIGNS:  Blood pressure 136/76, pulse 71 and regular, weight 54 kilos, height 173 cm.     GENERAL:  Slender and frail-appearing woman who is alert and oriented.  She walks slowly in an ataxic manner.  It was a bit difficult for her to walk from the chair to the examining table.  Accompanied by her daughter, Ashley.   HEENT:  Head normocephalic.   NECK:  Supple without bruits.   LUNGS:  Clear.  Decreased breath sounds.   CARDIOVASCULAR:  Regular rhythm with occasional ectopic beats.  No gallop, murmur or rub.   ABDOMEN:  Soft, nontender.   EXTREMITIES:  No edema.   SKIN:  No rash.   BACK:  No CVA tenderness.   VASCULAR:  1+ radials bilaterally.        DIAGNOSTIC STUDIES:    Her 12-lead ECG on 04/17/2019 showed sinus rhythm and was within normal limits.    Her echocardiogram 01/2019 showed normal EF, 1+ to 2+ MR, otherwise unremarkable.        IMPRESSION:    1.  Asymptomatic nonsustained VT.  NSVT was an incidental finding during an event monitor  that was ordered to evaluate for atrial arrhythmia as a cause of TIA.  Etiology of VT is unclear in this patient.  She has never had syncope and has normal LV function.  The likelihood of a life-threatening arrhythmia appears to be very low.        I told Ms. Damon and her daughter that I am 90% certain that her NSVT is nothing to worry about.  Nonetheless, it would be reasonable to pursue a nuclear stress test to rule out significant underlying ischemic heart disease as ischemic evaluation has not been done yet.  It was explained that NSVT can sometimes be a manifestation of ischemic heart disease.  The patient politely declined.      RECOMMENDATIONS:  No further evaluation.      Thank you for the opportunity to be part of her care.       OLIVA DIAZ MD, FACC          cc:   Consuelo Medellin MD    [facility]    [address] [place] [id number]      [facility]    [address], [facility] [address]   [place] [address]             D: 2019   T: 2019   MT: MELE      Name:     CUONG DAMON   MRN:      [id number]        Account:      [id number]   :      1935           Service Date: 2019      Document: [id number]

## 2019-05-31 NOTE — LETTER
5/31/2019      Pb Andrews MD  [address]  [facility] [address]      RE: Lisa Damon       Dear Colleague,    I had the pleasure of seeing Lisa Damon in the [facility].    Service Date: 05/31/2019        HISTORY OF PRESENT ILLNESS:    It is my pleasure to see Ms. Lisa Damon, an 83-year-old female who has been referred to EP by Dr. Medellin for evaluation of nonsustained VT.  Her history is well summarized in Julieta's note from 04/2019.  Ms. Damon was hospitalized in mid January with TIA symptoms.  Hospitalization and work-up did not reveal an obvious source.  She was sent home with a 30-day cardiac event monitor.  On the monitor, she had p39-tsuj run of SVT and 2 runs of VT (a 4-beat run and a 15-beat run).  The patient was asymptomatic.      The patient was subsequently referred to Dr. Medellin for assessment for NSVT.  She tells me today that she has never fainted.  She is limited physically because of severe peripheral neuropathy that affects her balance and mobility.      She does not have chest pain, though she does not become vigorously active due to her moving limitations.  She is a nonsmoker, nondrinker.      PHYSICAL EXAMINATION:   VITAL SIGNS:  Blood pressure 136/76, pulse 71 and regular, weight 54 kilos, height 173 cm.     GENERAL:  Slender and frail-appearing woman who is alert and oriented.  She walks slowly in an ataxic manner.  It was a bit difficult for her to walk from the chair to the examining table.  Accompanied by her daughter, Ashley.   HEENT:  Head normocephalic.   NECK:  Supple without bruits.   LUNGS:  Clear.  Decreased breath sounds.   CARDIOVASCULAR:  Regular rhythm with occasional ectopic beats.  No gallop, murmur or rub.   ABDOMEN:  Soft, nontender.   EXTREMITIES:  No edema.   SKIN:  No rash.   BACK:  No CVA tenderness.   VASCULAR:  1+ radials bilaterally.        DIAGNOSTIC STUDIES:    Her 12-lead ECG on 04/17/2019 showed  sinus rhythm and was within normal limits.    Her echocardiogram 2019 showed normal EF, 1+ to 2+ MR, otherwise unremarkable.        IMPRESSION:    1.  Asymptomatic nonsustained VT.  NSVT was an incidental finding during an event monitor that was ordered to evaluate for atrial arrhythmia as a cause of TIA.  Etiology of VT is unclear in this patient.  She has never had syncope and has normal LV function.  The likelihood of a life-threatening arrhythmia appears to be very low.        I told Ms. Damon and her daughter that I am 90% certain that her NSVT is nothing to worry about.  Nonetheless, it would be reasonable to pursue a nuclear stress test to rule out significant underlying ischemic heart disease as ischemic evaluation has not been done yet.  It was explained that NSVT can sometimes be a manifestation of ischemic heart disease.  The patient politely declined.      RECOMMENDATIONS:  No further evaluation.      Thank you for the opportunity to be part of her care.       OLIVA DIAZ MD, FACC          cc:   Consuelo Medellin MD    [facility]    [address] [place] [address]      Pb Andrews MD    [facility]    [address], [address]   [place] [address]             D: 2019   T: 2019   MT: MELE      Name:     CUONG DAMON   MRN:      [id number]        Account:      [id number]   :      1935           Service Date: 2019      Document: [id number]           Outpatient Encounter Medications as of 2019   Medication Sig Dispense Refill     calcium-vitamin D 500-125 MG-UNIT TABS Take 1 tablet by mouth daily        levothyroxine (SYNTHROID/LEVOTHROID) 75 MCG tablet Take 75 mcg by mouth daily       multivitamin, therapeutic with minerals (MULTI-VITAMIN) TABS tablet Take 1 tablet by mouth daily       order for DME Equipment being ordered: Walker Wheels () and Walker ()  Treatment Diagnosis: Difficulty ambulating 1 each 0      atorvastatin (LIPITOR) 10 MG tablet 1 tablet (10 mg) by Oral or NG Tube route daily 30 tablet 0     [DISCONTINUED] levothyroxine (SYNTHROID/LEVOTHROID) 75 MCG tablet Take 1 tablet (75 mcg) by mouth daily 30 tablet 0     No facility-administered encounter medications on file as of 5/31/2019.        Again, thank you for allowing me to participate in the care of your patient.      Sincerely,    Azeem Mustafa MD     [facility]

## 2019-05-31 NOTE — LETTER
5/31/2019    Pb Andrews MD  [address] [facility] [address]  [facility] [address]    RE: Lisa Damon       Dear Colleague,    I had the pleasure of seeing Lisa Damon in the [facility].    HPI and Plan:   See dictation    No orders of the defined types were placed in this encounter.      Orders Placed This Encounter   Medications     levothyroxine (SYNTHROID/LEVOTHROID) 75 MCG tablet     Sig: Take 75 mcg by mouth daily       Medications Discontinued During This Encounter   Medication Reason     levothyroxine (SYNTHROID/LEVOTHROID) 75 MCG tablet Medication Reconciliation Clean Up         Encounter Diagnoses   Name Primary?     Non-sustained ventricular tachycardia (H)      TIA (transient ischemic attack)      Mitral valve prolapse      Acquired hypothyroidism        CURRENT MEDICATIONS:  Current Outpatient Medications   Medication Sig Dispense Refill     calcium-vitamin D 500-125 MG-UNIT TABS Take 1 tablet by mouth daily        levothyroxine (SYNTHROID/LEVOTHROID) 75 MCG tablet Take 75 mcg by mouth daily       multivitamin, therapeutic with minerals (MULTI-VITAMIN) TABS tablet Take 1 tablet by mouth daily       order for DME Equipment being ordered: Walker Wheels () and Walker ()  Treatment Diagnosis: Difficulty ambulating 1 each 0     atorvastatin (LIPITOR) 10 MG tablet 1 tablet (10 mg) by Oral or NG Tube route daily 30 tablet 0       ALLERGIES   No Known Allergies    PAST MEDICAL HISTORY:  Past Medical History:   Diagnosis Date     Arthritis      Cholesteatoma      Hypothyroidism 3/25/2019     Peripheral neuropathy      Peripheral neuropathy 3/25/2019     Raynaud's syndrome      Scoliosis 3/25/2019     Scoliosis (and kyphoscoliosis), idiopathic      Unspecified hypothyroidism        PAST SURGICAL HISTORY:  Past Surgical History:   Procedure Laterality Date     BACK SURGERY      L4-5 FUSION     COLONOSCOPY N/A 10/6/2016    Procedure: COLONOSCOPY;  Surgeon: Iain  Cal Maurice MD;  Location:  GI     EXAM UNDER ANESTHESIA EAR(S) Right 2015    Procedure: EXAM UNDER ANESTHESIA EAR(S);  Surgeon: Wan Suh MD;  Location: [facility]     GYN SURGERY      HYSTERECTOMY     L4-5      DISKECTOMY     ORTHOPEDIC SURGERY      BILATERAL SHOULDER ARTHOPLASTY     TONSILLECTOMY       TYMPANOPLASTY Right 2015    Procedure: TYMPANOPLASTY;  Surgeon: Wan Suh MD;  Location: [facility]     TYMPANOPLASTY Right 2017    Procedure: TYMPANOPLASTY;  RIGHT TYMPANOPLASTY, REVISION PORP;  Surgeon: Wan Suh MD;  Location: [facility]       FAMILY HISTORY:  History reviewed. No pertinent family history.    SOCIAL HISTORY:  Social History     Socioeconomic History     Marital status: Single     Spouse name: None     Number of children: None     Years of education: None     Highest education level: None   Occupational History     None   Social Needs     Financial resource strain: None     Food insecurity:     Worry: None     Inability: None     Transportation needs:     Medical: None     Non-medical: None   Tobacco Use     Smoking status: Former Smoker     Packs/day: 0.50     Years: 5.00     Pack years: 2.50     Types: Cigarettes     Start date:      Last attempt to quit: 1965     Years since quittin.4     Smokeless tobacco: Never Used   Substance and Sexual Activity     Alcohol use: No     Drug use: No     Sexual activity: None   Lifestyle     Physical activity:     Days per week: None     Minutes per session: None     Stress: None   Relationships     Social connections:     Talks on phone: None     Gets together: None     Attends Rastafari service: None     Active member of club or organization: None     Attends meetings of clubs or organizations: None     Relationship status: None     Intimate partner violence:     Fear of current or ex partner: None     Emotionally abused: None     Physically abused: None     Forced sexual activity: None   Other Topics Concern      Parent/sibling w/ CABG, MI or angioplasty before 65F 55M? Not Asked   Social History Narrative     None       Review of Systems:  Skin:  Positive for nail changes splitting   Eyes:  Positive for glasses    ENT:  Positive for earache;nasal congestion    Respiratory:  Positive for dyspnea on exertion     Cardiovascular:  Negative      Gastroenterology: Negative      Genitourinary:  Negative      Musculoskeletal:  Negative      Neurologic:  Positive for numbness or tingling of feet    Psychiatric:  Positive for sleep disturbances difficulty sleeping  Heme/Lymph/Imm:  Negative      Endocrine:  Positive for thyroid disorder      [id number]              Thank you for allowing me to participate in the care of your patient.      Sincerely,     Azeem Mustafa MD     [facility]    cc:   Consuelo Medellin MD  [address] [place] [id number]

## 2019-05-31 NOTE — PROGRESS NOTES
HPI and Plan:   See dictation    No orders of the defined types were placed in this encounter.      Orders Placed This Encounter   Medications     levothyroxine (SYNTHROID/LEVOTHROID) 75 MCG tablet     Sig: Take 75 mcg by mouth daily       Medications Discontinued During This Encounter   Medication Reason     levothyroxine (SYNTHROID/LEVOTHROID) 75 MCG tablet Medication Reconciliation Clean Up         Encounter Diagnoses   Name Primary?     Non-sustained ventricular tachycardia (H)      TIA (transient ischemic attack)      Mitral valve prolapse      Acquired hypothyroidism        CURRENT MEDICATIONS:  Current Outpatient Medications   Medication Sig Dispense Refill     calcium-vitamin D 500-125 MG-UNIT TABS Take 1 tablet by mouth daily        levothyroxine (SYNTHROID/LEVOTHROID) 75 MCG tablet Take 75 mcg by mouth daily       multivitamin, therapeutic with minerals (MULTI-VITAMIN) TABS tablet Take 1 tablet by mouth daily       order for DME Equipment being ordered: Walker Wheels () and Walker ()  Treatment Diagnosis: Difficulty ambulating 1 each 0     atorvastatin (LIPITOR) 10 MG tablet 1 tablet (10 mg) by Oral or NG Tube route daily 30 tablet 0       ALLERGIES   No Known Allergies    PAST MEDICAL HISTORY:  Past Medical History:   Diagnosis Date     Arthritis      Cholesteatoma      Hypothyroidism 3/25/2019     Peripheral neuropathy      Peripheral neuropathy 3/25/2019     Raynaud's syndrome      Scoliosis 3/25/2019     Scoliosis (and kyphoscoliosis), idiopathic      Unspecified hypothyroidism        PAST SURGICAL HISTORY:  Past Surgical History:   Procedure Laterality Date     BACK SURGERY      L4-5 FUSION     COLONOSCOPY N/A 10/6/2016    Procedure: COLONOSCOPY;  Surgeon: Cal Timmons MD;  Location:  GI     EXAM UNDER ANESTHESIA EAR(S) Right 8/6/2015    Procedure: EXAM UNDER ANESTHESIA EAR(S);  Surgeon: Wan Suh MD;  Location:  [facility]     GYN SURGERY      HYSTERECTOMY     L4-5       DISKECTOMY     ORTHOPEDIC SURGERY      BILATERAL SHOULDER ARTHOPLASTY     TONSILLECTOMY       TYMPANOPLASTY Right 2015    Procedure: TYMPANOPLASTY;  Surgeon: Wan Suh MD;  Location: [facility]     TYMPANOPLASTY Right 2017    Procedure: TYMPANOPLASTY;  RIGHT TYMPANOPLASTY, REVISION PORP;  Surgeon: Wan Suh MD;  Location: [facility]       FAMILY HISTORY:  History reviewed. No pertinent family history.    SOCIAL HISTORY:  Social History     Socioeconomic History     Marital status: Single     Spouse name: None     Number of children: None     Years of education: None     Highest education level: None   Occupational History     None   Social Needs     Financial resource strain: None     Food insecurity:     Worry: None     Inability: None     Transportation needs:     Medical: None     Non-medical: None   Tobacco Use     Smoking status: Former Smoker     Packs/day: 0.50     Years: 5.00     Pack years: 2.50     Types: Cigarettes     Start date:      Last attempt to quit: 1965     Years since quittin.4     Smokeless tobacco: Never Used   Substance and Sexual Activity     Alcohol use: No     Drug use: No     Sexual activity: None   Lifestyle     Physical activity:     Days per week: None     Minutes per session: None     Stress: None   Relationships     Social connections:     Talks on phone: None     Gets together: None     Attends Pentecostalism service: None     Active member of club or organization: None     Attends meetings of clubs or organizations: None     Relationship status: None     Intimate partner violence:     Fear of current or ex partner: None     Emotionally abused: None     Physically abused: None     Forced sexual activity: None   Other Topics Concern     Parent/sibling w/ CABG, MI or angioplasty before 65F 55M? Not Asked   Social History Narrative     None       Review of Systems:  Skin:  Positive for nail changes splitting   Eyes:  Positive for glasses    ENT:  Positive for  earache;nasal congestion    Respiratory:  Positive for dyspnea on exertion     Cardiovascular:  Negative      Gastroenterology: Negative      Genitourinary:  Negative      Musculoskeletal:  Negative      Neurologic:  Positive for numbness or tingling of feet    Psychiatric:  Positive for sleep disturbances difficulty sleeping  Heme/Lymph/Imm:  Negative      Endocrine:  Positive for thyroid disorder      [id number]

## 2020-01-01 ENCOUNTER — APPOINTMENT (OUTPATIENT)
Dept: CT IMAGING | Facility: CLINIC | Age: 85
DRG: 291 | End: 2020-01-01
Attending: INTERNAL MEDICINE
Payer: COMMERCIAL

## 2020-01-01 ENCOUNTER — APPOINTMENT (OUTPATIENT)
Dept: OCCUPATIONAL THERAPY | Facility: CLINIC | Age: 85
DRG: 291 | End: 2020-01-01
Attending: INTERNAL MEDICINE
Payer: COMMERCIAL

## 2020-01-01 ENCOUNTER — APPOINTMENT (OUTPATIENT)
Dept: CARDIOLOGY | Facility: CLINIC | Age: 85
DRG: 291 | End: 2020-01-01
Attending: INTERNAL MEDICINE
Payer: COMMERCIAL

## 2020-01-01 ENCOUNTER — HOSPITAL ENCOUNTER (INPATIENT)
Facility: CLINIC | Age: 85
LOS: 4 days | Discharge: HOSPICE | DRG: 291 | End: 2020-02-28
Attending: EMERGENCY MEDICINE | Admitting: INTERNAL MEDICINE
Payer: COMMERCIAL

## 2020-01-01 ENCOUNTER — APPOINTMENT (OUTPATIENT)
Dept: GENERAL RADIOLOGY | Facility: CLINIC | Age: 85
DRG: 291 | End: 2020-01-01
Attending: EMERGENCY MEDICINE
Payer: COMMERCIAL

## 2020-01-01 ENCOUNTER — APPOINTMENT (OUTPATIENT)
Dept: ULTRASOUND IMAGING | Facility: CLINIC | Age: 85
DRG: 291 | End: 2020-01-01
Attending: EMERGENCY MEDICINE
Payer: COMMERCIAL

## 2020-01-01 VITALS
SYSTOLIC BLOOD PRESSURE: 113 MMHG | WEIGHT: 103 LBS | DIASTOLIC BLOOD PRESSURE: 82 MMHG | RESPIRATION RATE: 22 BRPM | HEIGHT: 70 IN | OXYGEN SATURATION: 90 % | TEMPERATURE: 98.4 F | BODY MASS INDEX: 14.75 KG/M2 | HEART RATE: 109 BPM

## 2020-01-01 DIAGNOSIS — Z51.5 COMFORT MEASURES ONLY STATUS: Primary | ICD-10-CM

## 2020-01-01 DIAGNOSIS — J18.9 PNEUMONIA DUE TO INFECTIOUS ORGANISM, UNSPECIFIED LATERALITY, UNSPECIFIED PART OF LUNG: ICD-10-CM

## 2020-01-01 DIAGNOSIS — E03.9 HYPOTHYROIDISM, UNSPECIFIED TYPE: ICD-10-CM

## 2020-01-01 DIAGNOSIS — I48.91 ATRIAL FIBRILLATION WITH RVR (H): ICD-10-CM

## 2020-01-01 LAB
ALBUMIN SERPL-MCNC: 2.2 G/DL (ref 3.4–5)
ALBUMIN SERPL-MCNC: 3.4 G/DL (ref 3.4–5)
ALBUMIN UR-MCNC: NEGATIVE MG/DL
ALP SERPL-CCNC: 103 U/L (ref 40–150)
ALP SERPL-CCNC: 110 U/L (ref 40–150)
ALT SERPL W P-5'-P-CCNC: 150 U/L (ref 0–50)
ALT SERPL W P-5'-P-CCNC: 47 U/L (ref 0–50)
ANION GAP SERPL CALCULATED.3IONS-SCNC: 2 MMOL/L (ref 3–14)
ANION GAP SERPL CALCULATED.3IONS-SCNC: 4 MMOL/L (ref 3–14)
ANION GAP SERPL CALCULATED.3IONS-SCNC: 5 MMOL/L (ref 3–14)
ANION GAP SERPL CALCULATED.3IONS-SCNC: 7 MMOL/L (ref 3–14)
APPEARANCE UR: CLEAR
AST SERPL W P-5'-P-CCNC: 35 U/L (ref 0–45)
AST SERPL W P-5'-P-CCNC: 51 U/L (ref 0–45)
BACTERIA SPEC CULT: NO GROWTH
BACTERIA SPEC CULT: NO GROWTH
BASOPHILS # BLD AUTO: 0 10E9/L (ref 0–0.2)
BASOPHILS NFR BLD AUTO: 0.3 %
BILIRUB SERPL-MCNC: 0.5 MG/DL (ref 0.2–1.3)
BILIRUB SERPL-MCNC: 0.7 MG/DL (ref 0.2–1.3)
BILIRUB UR QL STRIP: NEGATIVE
BUN SERPL-MCNC: 25 MG/DL (ref 7–30)
BUN SERPL-MCNC: 29 MG/DL (ref 7–30)
BUN SERPL-MCNC: 31 MG/DL (ref 7–30)
BUN SERPL-MCNC: 43 MG/DL (ref 7–30)
CALCIUM SERPL-MCNC: 10.1 MG/DL (ref 8.5–10.1)
CALCIUM SERPL-MCNC: 9.1 MG/DL (ref 8.5–10.1)
CALCIUM SERPL-MCNC: 9.1 MG/DL (ref 8.5–10.1)
CALCIUM SERPL-MCNC: 9.2 MG/DL (ref 8.5–10.1)
CHLORIDE SERPL-SCNC: 100 MMOL/L (ref 94–109)
CHLORIDE SERPL-SCNC: 100 MMOL/L (ref 94–109)
CHLORIDE SERPL-SCNC: 104 MMOL/L (ref 94–109)
CHLORIDE SERPL-SCNC: 95 MMOL/L (ref 94–109)
CO2 SERPL-SCNC: 30 MMOL/L (ref 20–32)
CO2 SERPL-SCNC: 30 MMOL/L (ref 20–32)
CO2 SERPL-SCNC: 36 MMOL/L (ref 20–32)
CO2 SERPL-SCNC: 37 MMOL/L (ref 20–32)
COLOR UR AUTO: YELLOW
CREAT SERPL-MCNC: 0.74 MG/DL (ref 0.52–1.04)
CREAT SERPL-MCNC: 0.81 MG/DL (ref 0.52–1.04)
CREAT SERPL-MCNC: 0.86 MG/DL (ref 0.52–1.04)
CREAT SERPL-MCNC: 1.13 MG/DL (ref 0.52–1.04)
DIFFERENTIAL METHOD BLD: ABNORMAL
DIGOXIN SERPL-MCNC: 0.9 UG/L (ref 0.5–2)
EOSINOPHIL # BLD AUTO: 0.1 10E9/L (ref 0–0.7)
EOSINOPHIL NFR BLD AUTO: 0.6 %
ERYTHROCYTE [DISTWIDTH] IN BLOOD BY AUTOMATED COUNT: 13.9 % (ref 10–15)
ERYTHROCYTE [DISTWIDTH] IN BLOOD BY AUTOMATED COUNT: 14 % (ref 10–15)
ERYTHROCYTE [DISTWIDTH] IN BLOOD BY AUTOMATED COUNT: 14.1 % (ref 10–15)
ERYTHROCYTE [DISTWIDTH] IN BLOOD BY AUTOMATED COUNT: 14.1 % (ref 10–15)
ERYTHROCYTE [DISTWIDTH] IN BLOOD BY AUTOMATED COUNT: 14.2 % (ref 10–15)
FLUAV+FLUBV AG SPEC QL: NEGATIVE
FLUAV+FLUBV AG SPEC QL: NEGATIVE
GFR SERPL CREATININE-BSD FRML MDRD: 45 ML/MIN/{1.73_M2}
GFR SERPL CREATININE-BSD FRML MDRD: 63 ML/MIN/{1.73_M2}
GFR SERPL CREATININE-BSD FRML MDRD: 67 ML/MIN/{1.73_M2}
GFR SERPL CREATININE-BSD FRML MDRD: 75 ML/MIN/{1.73_M2}
GLUCOSE BLDC GLUCOMTR-MCNC: 109 MG/DL (ref 70–99)
GLUCOSE BLDC GLUCOMTR-MCNC: 55 MG/DL (ref 70–99)
GLUCOSE BLDC GLUCOMTR-MCNC: 55 MG/DL (ref 70–99)
GLUCOSE BLDC GLUCOMTR-MCNC: 94 MG/DL (ref 70–99)
GLUCOSE SERPL-MCNC: 104 MG/DL (ref 70–99)
GLUCOSE SERPL-MCNC: 109 MG/DL (ref 70–99)
GLUCOSE SERPL-MCNC: 66 MG/DL (ref 70–99)
GLUCOSE SERPL-MCNC: 91 MG/DL (ref 70–99)
GLUCOSE UR STRIP-MCNC: NEGATIVE MG/DL
HCT VFR BLD AUTO: 36.3 % (ref 35–47)
HCT VFR BLD AUTO: 38 % (ref 35–47)
HCT VFR BLD AUTO: 38 % (ref 35–47)
HCT VFR BLD AUTO: 38.4 % (ref 35–47)
HCT VFR BLD AUTO: 38.6 % (ref 35–47)
HGB BLD-MCNC: 11.7 G/DL (ref 11.7–15.7)
HGB BLD-MCNC: 12.1 G/DL (ref 11.7–15.7)
HGB BLD-MCNC: 12.3 G/DL (ref 11.7–15.7)
HGB BLD-MCNC: 12.5 G/DL (ref 11.7–15.7)
HGB BLD-MCNC: 12.9 G/DL (ref 11.7–15.7)
HGB UR QL STRIP: NEGATIVE
HYALINE CASTS #/AREA URNS LPF: 1 /LPF (ref 0–2)
IMM GRANULOCYTES # BLD: 0.1 10E9/L (ref 0–0.4)
IMM GRANULOCYTES NFR BLD: 0.8 %
INTERPRETATION ECG - MUSE: NORMAL
KETONES UR STRIP-MCNC: NEGATIVE MG/DL
LACTATE BLD-SCNC: 0.9 MMOL/L (ref 0.7–2)
LACTATE BLD-SCNC: 1.7 MMOL/L (ref 0.7–2)
LACTATE BLD-SCNC: 2 MMOL/L (ref 0.7–2)
LACTATE BLD-SCNC: 2.2 MMOL/L (ref 0.7–2)
LACTATE BLD-SCNC: 2.2 MMOL/L (ref 0.7–2)
LACTATE BLD-SCNC: 3.3 MMOL/L (ref 0.7–2)
LACTATE BLD-SCNC: 4.6 MMOL/L (ref 0.7–2)
LEUKOCYTE ESTERASE UR QL STRIP: NEGATIVE
LYMPHOCYTES # BLD AUTO: 1.1 10E9/L (ref 0.8–5.3)
LYMPHOCYTES NFR BLD AUTO: 8.6 %
MAGNESIUM SERPL-MCNC: 1.9 MG/DL (ref 1.6–2.3)
MAGNESIUM SERPL-MCNC: 2.1 MG/DL (ref 1.6–2.3)
MCH RBC QN AUTO: 29.7 PG (ref 26.5–33)
MCH RBC QN AUTO: 30 PG (ref 26.5–33)
MCH RBC QN AUTO: 30 PG (ref 26.5–33)
MCH RBC QN AUTO: 30.1 PG (ref 26.5–33)
MCH RBC QN AUTO: 30.9 PG (ref 26.5–33)
MCHC RBC AUTO-ENTMCNC: 31.8 G/DL (ref 31.5–36.5)
MCHC RBC AUTO-ENTMCNC: 32.2 G/DL (ref 31.5–36.5)
MCHC RBC AUTO-ENTMCNC: 32.4 G/DL (ref 31.5–36.5)
MCHC RBC AUTO-ENTMCNC: 32.4 G/DL (ref 31.5–36.5)
MCHC RBC AUTO-ENTMCNC: 33.6 G/DL (ref 31.5–36.5)
MCV RBC AUTO: 92 FL (ref 78–100)
MCV RBC AUTO: 92 FL (ref 78–100)
MCV RBC AUTO: 93 FL (ref 78–100)
MCV RBC AUTO: 93 FL (ref 78–100)
MCV RBC AUTO: 94 FL (ref 78–100)
MONOCYTES # BLD AUTO: 1.3 10E9/L (ref 0–1.3)
MONOCYTES NFR BLD AUTO: 9.7 %
NEUTROPHILS # BLD AUTO: 10.4 10E9/L (ref 1.6–8.3)
NEUTROPHILS NFR BLD AUTO: 80 %
NITRATE UR QL: NEGATIVE
NRBC # BLD AUTO: 0 10*3/UL
NRBC BLD AUTO-RTO: 0 /100
NT-PROBNP SERPL-MCNC: ABNORMAL PG/ML (ref 0–1800)
NT-PROBNP SERPL-MCNC: ABNORMAL PG/ML (ref 0–1800)
PH UR STRIP: 6 PH (ref 5–7)
PLATELET # BLD AUTO: 307 10E9/L (ref 150–450)
PLATELET # BLD AUTO: 331 10E9/L (ref 150–450)
PLATELET # BLD AUTO: 333 10E9/L (ref 150–450)
PLATELET # BLD AUTO: 335 10E9/L (ref 150–450)
PLATELET # BLD AUTO: 347 10E9/L (ref 150–450)
POTASSIUM SERPL-SCNC: 2.9 MMOL/L (ref 3.4–5.3)
POTASSIUM SERPL-SCNC: 3.5 MMOL/L (ref 3.4–5.3)
POTASSIUM SERPL-SCNC: 3.6 MMOL/L (ref 3.4–5.3)
POTASSIUM SERPL-SCNC: 3.8 MMOL/L (ref 3.4–5.3)
POTASSIUM SERPL-SCNC: 4.7 MMOL/L (ref 3.4–5.3)
PROCALCITONIN SERPL-MCNC: 0.07 NG/ML
PROT SERPL-MCNC: 5.7 G/DL (ref 6.8–8.8)
PROT SERPL-MCNC: 7.5 G/DL (ref 6.8–8.8)
RBC # BLD AUTO: 3.89 10E12/L (ref 3.8–5.2)
RBC # BLD AUTO: 4.03 10E12/L (ref 3.8–5.2)
RBC # BLD AUTO: 4.14 10E12/L (ref 3.8–5.2)
RBC # BLD AUTO: 4.16 10E12/L (ref 3.8–5.2)
RBC # BLD AUTO: 4.18 10E12/L (ref 3.8–5.2)
RBC #/AREA URNS AUTO: 1 /HPF (ref 0–2)
SODIUM SERPL-SCNC: 135 MMOL/L (ref 133–144)
SODIUM SERPL-SCNC: 136 MMOL/L (ref 133–144)
SODIUM SERPL-SCNC: 138 MMOL/L (ref 133–144)
SODIUM SERPL-SCNC: 141 MMOL/L (ref 133–144)
SOURCE: NORMAL
SP GR UR STRIP: 1.01 (ref 1–1.03)
SPECIMEN SOURCE: NORMAL
TROPONIN I SERPL-MCNC: 0.02 UG/L (ref 0–0.04)
TROPONIN I SERPL-MCNC: 0.04 UG/L (ref 0–0.04)
TROPONIN I SERPL-MCNC: <0.015 UG/L (ref 0–0.04)
TROPONIN I SERPL-MCNC: <0.015 UG/L (ref 0–0.04)
TSH SERPL DL<=0.005 MIU/L-ACNC: 1.79 MU/L (ref 0.4–4)
TSH SERPL DL<=0.005 MIU/L-ACNC: 2.49 MU/L (ref 0.4–4)
UROBILINOGEN UR STRIP-MCNC: NORMAL MG/DL (ref 0–2)
WBC # BLD AUTO: 12.5 10E9/L (ref 4–11)
WBC # BLD AUTO: 13 10E9/L (ref 4–11)
WBC # BLD AUTO: 13.1 10E9/L (ref 4–11)
WBC # BLD AUTO: 13.6 10E9/L (ref 4–11)
WBC # BLD AUTO: 14 10E9/L (ref 4–11)
WBC #/AREA URNS AUTO: <1 /HPF (ref 0–5)

## 2020-01-01 PROCEDURE — 94640 AIRWAY INHALATION TREATMENT: CPT | Mod: 76

## 2020-01-01 PROCEDURE — 84484 ASSAY OF TROPONIN QUANT: CPT | Performed by: EMERGENCY MEDICINE

## 2020-01-01 PROCEDURE — 25000128 H RX IP 250 OP 636: Performed by: INTERNAL MEDICINE

## 2020-01-01 PROCEDURE — 36415 COLL VENOUS BLD VENIPUNCTURE: CPT | Performed by: INTERNAL MEDICINE

## 2020-01-01 PROCEDURE — 36415 COLL VENOUS BLD VENIPUNCTURE: CPT | Performed by: HOSPITALIST

## 2020-01-01 PROCEDURE — 93970 EXTREMITY STUDY: CPT

## 2020-01-01 PROCEDURE — 36415 COLL VENOUS BLD VENIPUNCTURE: CPT | Performed by: PHYSICIAN ASSISTANT

## 2020-01-01 PROCEDURE — 97166 OT EVAL MOD COMPLEX 45 MIN: CPT | Mod: GO

## 2020-01-01 PROCEDURE — 81001 URINALYSIS AUTO W/SCOPE: CPT | Performed by: PHYSICIAN ASSISTANT

## 2020-01-01 PROCEDURE — 25000132 ZZH RX MED GY IP 250 OP 250 PS 637: Performed by: INTERNAL MEDICINE

## 2020-01-01 PROCEDURE — 85027 COMPLETE CBC AUTOMATED: CPT | Performed by: PHYSICIAN ASSISTANT

## 2020-01-01 PROCEDURE — 80162 ASSAY OF DIGOXIN TOTAL: CPT | Performed by: PHYSICIAN ASSISTANT

## 2020-01-01 PROCEDURE — 84484 ASSAY OF TROPONIN QUANT: CPT | Performed by: INTERNAL MEDICINE

## 2020-01-01 PROCEDURE — 25800030 ZZH RX IP 258 OP 636: Performed by: INTERNAL MEDICINE

## 2020-01-01 PROCEDURE — 83605 ASSAY OF LACTIC ACID: CPT | Performed by: INTERNAL MEDICINE

## 2020-01-01 PROCEDURE — 96361 HYDRATE IV INFUSION ADD-ON: CPT

## 2020-01-01 PROCEDURE — 25000125 ZZHC RX 250: Performed by: EMERGENCY MEDICINE

## 2020-01-01 PROCEDURE — 93005 ELECTROCARDIOGRAM TRACING: CPT

## 2020-01-01 PROCEDURE — 83605 ASSAY OF LACTIC ACID: CPT | Performed by: HOSPITALIST

## 2020-01-01 PROCEDURE — 87804 INFLUENZA ASSAY W/OPTIC: CPT | Performed by: EMERGENCY MEDICINE

## 2020-01-01 PROCEDURE — 99222 1ST HOSP IP/OBS MODERATE 55: CPT | Mod: 25 | Performed by: INTERNAL MEDICINE

## 2020-01-01 PROCEDURE — 83735 ASSAY OF MAGNESIUM: CPT | Performed by: INTERNAL MEDICINE

## 2020-01-01 PROCEDURE — 99232 SBSQ HOSP IP/OBS MODERATE 35: CPT | Performed by: PHYSICIAN ASSISTANT

## 2020-01-01 PROCEDURE — 84443 ASSAY THYROID STIM HORMONE: CPT | Performed by: EMERGENCY MEDICINE

## 2020-01-01 PROCEDURE — 25000125 ZZHC RX 250: Performed by: INTERNAL MEDICINE

## 2020-01-01 PROCEDURE — 83605 ASSAY OF LACTIC ACID: CPT | Performed by: NURSE PRACTITIONER

## 2020-01-01 PROCEDURE — 25000128 H RX IP 250 OP 636: Performed by: EMERGENCY MEDICINE

## 2020-01-01 PROCEDURE — 99232 SBSQ HOSP IP/OBS MODERATE 35: CPT | Performed by: INTERNAL MEDICINE

## 2020-01-01 PROCEDURE — 80048 BASIC METABOLIC PNL TOTAL CA: CPT | Performed by: INTERNAL MEDICINE

## 2020-01-01 PROCEDURE — 25500064 ZZH RX 255 OP 636: Performed by: INTERNAL MEDICINE

## 2020-01-01 PROCEDURE — 83605 ASSAY OF LACTIC ACID: CPT | Performed by: EMERGENCY MEDICINE

## 2020-01-01 PROCEDURE — 25000132 ZZH RX MED GY IP 250 OP 250 PS 637: Performed by: PHYSICIAN ASSISTANT

## 2020-01-01 PROCEDURE — 99207 ZZC CDG-MDM COMPONENT: MEETS MODERATE - UP CODED: CPT | Performed by: PHYSICIAN ASSISTANT

## 2020-01-01 PROCEDURE — 40000275 ZZH STATISTIC RCP TIME EA 10 MIN

## 2020-01-01 PROCEDURE — 25800025 ZZH RX 258: Performed by: PHYSICIAN ASSISTANT

## 2020-01-01 PROCEDURE — 71260 CT THORAX DX C+: CPT

## 2020-01-01 PROCEDURE — 21000001 ZZH R&B HEART CARE

## 2020-01-01 PROCEDURE — 96365 THER/PROPH/DIAG IV INF INIT: CPT

## 2020-01-01 PROCEDURE — 40000264 ECHOCARDIOGRAM COMPLETE

## 2020-01-01 PROCEDURE — 84145 PROCALCITONIN (PCT): CPT | Performed by: INTERNAL MEDICINE

## 2020-01-01 PROCEDURE — 96375 TX/PRO/DX INJ NEW DRUG ADDON: CPT

## 2020-01-01 PROCEDURE — 99223 1ST HOSP IP/OBS HIGH 75: CPT | Performed by: INTERNAL MEDICINE

## 2020-01-01 PROCEDURE — 94640 AIRWAY INHALATION TREATMENT: CPT

## 2020-01-01 PROCEDURE — 80053 COMPREHEN METABOLIC PANEL: CPT | Performed by: PHYSICIAN ASSISTANT

## 2020-01-01 PROCEDURE — 80053 COMPREHEN METABOLIC PANEL: CPT | Performed by: EMERGENCY MEDICINE

## 2020-01-01 PROCEDURE — 25800030 ZZH RX IP 258 OP 636: Performed by: EMERGENCY MEDICINE

## 2020-01-01 PROCEDURE — 83880 ASSAY OF NATRIURETIC PEPTIDE: CPT | Performed by: HOSPITALIST

## 2020-01-01 PROCEDURE — 84132 ASSAY OF SERUM POTASSIUM: CPT | Performed by: HOSPITALIST

## 2020-01-01 PROCEDURE — 96376 TX/PRO/DX INJ SAME DRUG ADON: CPT

## 2020-01-01 PROCEDURE — 99223 1ST HOSP IP/OBS HIGH 75: CPT | Mod: AI | Performed by: INTERNAL MEDICINE

## 2020-01-01 PROCEDURE — 80048 BASIC METABOLIC PNL TOTAL CA: CPT | Performed by: PHYSICIAN ASSISTANT

## 2020-01-01 PROCEDURE — 00000146 ZZHCL STATISTIC GLUCOSE BY METER IP

## 2020-01-01 PROCEDURE — 99239 HOSP IP/OBS DSCHRG MGMT >30: CPT | Performed by: PHYSICIAN ASSISTANT

## 2020-01-01 PROCEDURE — 83880 ASSAY OF NATRIURETIC PEPTIDE: CPT | Performed by: EMERGENCY MEDICINE

## 2020-01-01 PROCEDURE — 99207 ZZC CONSULT E&M CHANGED TO INITIAL LEVEL: CPT | Performed by: INTERNAL MEDICINE

## 2020-01-01 PROCEDURE — 85027 COMPLETE CBC AUTOMATED: CPT | Performed by: INTERNAL MEDICINE

## 2020-01-01 PROCEDURE — 93306 TTE W/DOPPLER COMPLETE: CPT | Mod: 26 | Performed by: INTERNAL MEDICINE

## 2020-01-01 PROCEDURE — 99233 SBSQ HOSP IP/OBS HIGH 50: CPT | Performed by: PHYSICIAN ASSISTANT

## 2020-01-01 PROCEDURE — 97535 SELF CARE MNGMENT TRAINING: CPT | Mod: GO

## 2020-01-01 PROCEDURE — 36415 COLL VENOUS BLD VENIPUNCTURE: CPT | Performed by: NURSE PRACTITIONER

## 2020-01-01 PROCEDURE — 71046 X-RAY EXAM CHEST 2 VIEWS: CPT

## 2020-01-01 PROCEDURE — 99285 EMERGENCY DEPT VISIT HI MDM: CPT | Mod: 25

## 2020-01-01 PROCEDURE — 84484 ASSAY OF TROPONIN QUANT: CPT | Performed by: HOSPITALIST

## 2020-01-01 PROCEDURE — 85025 COMPLETE CBC W/AUTO DIFF WBC: CPT | Performed by: EMERGENCY MEDICINE

## 2020-01-01 PROCEDURE — 87040 BLOOD CULTURE FOR BACTERIA: CPT | Performed by: NURSE PRACTITIONER

## 2020-01-01 PROCEDURE — 84443 ASSAY THYROID STIM HORMONE: CPT | Performed by: INTERNAL MEDICINE

## 2020-01-01 RX ORDER — METOPROLOL TARTRATE 1 MG/ML
5-15 INJECTION, SOLUTION INTRAVENOUS EVERY 6 HOURS PRN
Status: DISCONTINUED | OUTPATIENT
Start: 2020-01-01 | End: 2020-01-01

## 2020-01-01 RX ORDER — LEVOTHYROXINE SODIUM 88 UG/1
88 TABLET ORAL DAILY
Status: ON HOLD | COMMUNITY
End: 2020-01-01

## 2020-01-01 RX ORDER — AMOXICILLIN 250 MG
1 CAPSULE ORAL 2 TIMES DAILY
Status: DISCONTINUED | OUTPATIENT
Start: 2020-01-01 | End: 2020-01-01

## 2020-01-01 RX ORDER — HYDROMORPHONE HYDROCHLORIDE 1 MG/ML
0.2 INJECTION, SOLUTION INTRAMUSCULAR; INTRAVENOUS; SUBCUTANEOUS
Status: DISCONTINUED | OUTPATIENT
Start: 2020-01-01 | End: 2020-01-01 | Stop reason: HOSPADM

## 2020-01-01 RX ORDER — POTASSIUM CHLORIDE 29.8 MG/ML
20 INJECTION INTRAVENOUS
Status: DISCONTINUED | OUTPATIENT
Start: 2020-01-01 | End: 2020-01-01

## 2020-01-01 RX ORDER — BISACODYL 10 MG
10 SUPPOSITORY, RECTAL RECTAL DAILY PRN
Qty: 20 SUPPOSITORY | Refills: 1 | Status: SHIPPED | OUTPATIENT
Start: 2020-01-01

## 2020-01-01 RX ORDER — LANOLIN ALCOHOL/MO/W.PET/CERES
100 CREAM (GRAM) TOPICAL DAILY
Status: DISCONTINUED | OUTPATIENT
Start: 2020-01-01 | End: 2020-01-01

## 2020-01-01 RX ORDER — HALOPERIDOL 2 MG/ML
1 SOLUTION ORAL EVERY 6 HOURS PRN
Qty: 30 ML | Refills: 1 | Status: SHIPPED | OUTPATIENT
Start: 2020-01-01

## 2020-01-01 RX ORDER — TROLAMINE SALICYLATE 10 G/100G
CREAM TOPICAL PRN
Status: ON HOLD | COMMUNITY
End: 2020-01-01

## 2020-01-01 RX ORDER — MULTIPLE VITAMINS W/ MINERALS TAB 9MG-400MCG
1 TAB ORAL DAILY
Status: DISCONTINUED | OUTPATIENT
Start: 2020-01-01 | End: 2020-01-01

## 2020-01-01 RX ORDER — FUROSEMIDE 40 MG
40 TABLET ORAL DAILY
Status: DISCONTINUED | OUTPATIENT
Start: 2020-01-01 | End: 2020-01-01

## 2020-01-01 RX ORDER — IOPAMIDOL 755 MG/ML
80 INJECTION, SOLUTION INTRAVASCULAR ONCE
Status: COMPLETED | OUTPATIENT
Start: 2020-01-01 | End: 2020-01-01

## 2020-01-01 RX ORDER — LORAZEPAM 2 MG/ML
0.25 CONCENTRATE ORAL EVERY 4 HOURS PRN
Qty: 30 ML | Refills: 1 | Status: SHIPPED | OUTPATIENT
Start: 2020-01-01

## 2020-01-01 RX ORDER — FAMOTIDINE 20 MG/1
20 TABLET, FILM COATED ORAL 2 TIMES DAILY
Status: DISCONTINUED | OUTPATIENT
Start: 2020-01-01 | End: 2020-01-01

## 2020-01-01 RX ORDER — POTASSIUM CHLORIDE 7.45 MG/ML
10 INJECTION INTRAVENOUS
Status: DISCONTINUED | OUTPATIENT
Start: 2020-01-01 | End: 2020-01-01

## 2020-01-01 RX ORDER — AZITHROMYCIN 500 MG/1
500 INJECTION, POWDER, LYOPHILIZED, FOR SOLUTION INTRAVENOUS EVERY 24 HOURS
Status: COMPLETED | OUTPATIENT
Start: 2020-01-01 | End: 2020-01-01

## 2020-01-01 RX ORDER — METOPROLOL TARTRATE 1 MG/ML
5 INJECTION, SOLUTION INTRAVENOUS EVERY 6 HOURS
Status: DISCONTINUED | OUTPATIENT
Start: 2020-01-01 | End: 2020-01-01

## 2020-01-01 RX ORDER — DEXTROSE MONOHYDRATE 25 G/50ML
25-50 INJECTION, SOLUTION INTRAVENOUS
Status: DISCONTINUED | OUTPATIENT
Start: 2020-01-01 | End: 2020-01-01 | Stop reason: HOSPADM

## 2020-01-01 RX ORDER — LEVOTHYROXINE SODIUM 88 UG/1
88 TABLET ORAL DAILY
Qty: 30 TABLET | Refills: 1 | Status: SHIPPED | OUTPATIENT
Start: 2020-01-01

## 2020-01-01 RX ORDER — FUROSEMIDE 10 MG/ML
40 INJECTION INTRAMUSCULAR; INTRAVENOUS EVERY 12 HOURS
Status: DISCONTINUED | OUTPATIENT
Start: 2020-01-01 | End: 2020-01-01

## 2020-01-01 RX ORDER — POTASSIUM CL/LIDO/0.9 % NACL 10MEQ/0.1L
10 INTRAVENOUS SOLUTION, PIGGYBACK (ML) INTRAVENOUS
Status: DISCONTINUED | OUTPATIENT
Start: 2020-01-01 | End: 2020-01-01

## 2020-01-01 RX ORDER — CEFTRIAXONE 2 G/1
2 INJECTION, POWDER, FOR SOLUTION INTRAMUSCULAR; INTRAVENOUS ONCE
Status: COMPLETED | OUTPATIENT
Start: 2020-01-01 | End: 2020-01-01

## 2020-01-01 RX ORDER — POTASSIUM CHLORIDE 1.5 G/1.58G
20-40 POWDER, FOR SOLUTION ORAL
Status: DISCONTINUED | OUTPATIENT
Start: 2020-01-01 | End: 2020-01-01

## 2020-01-01 RX ORDER — LORAZEPAM 0.5 MG/1
.5-1 TABLET ORAL
Status: DISCONTINUED | OUTPATIENT
Start: 2020-01-01 | End: 2020-01-01 | Stop reason: HOSPADM

## 2020-01-01 RX ORDER — LEVOTHYROXINE SODIUM 88 UG/1
88 TABLET ORAL DAILY
Status: DISCONTINUED | OUTPATIENT
Start: 2020-01-01 | End: 2020-01-01 | Stop reason: HOSPADM

## 2020-01-01 RX ORDER — METOPROLOL TARTRATE 1 MG/ML
5 INJECTION, SOLUTION INTRAVENOUS
Status: ACTIVE | OUTPATIENT
Start: 2020-01-01 | End: 2020-01-01

## 2020-01-01 RX ORDER — ONDANSETRON 4 MG/1
4 TABLET, ORALLY DISINTEGRATING ORAL EVERY 6 HOURS PRN
Status: DISCONTINUED | OUTPATIENT
Start: 2020-01-01 | End: 2020-01-01 | Stop reason: HOSPADM

## 2020-01-01 RX ORDER — MAGNESIUM SULFATE HEPTAHYDRATE 40 MG/ML
4 INJECTION, SOLUTION INTRAVENOUS EVERY 4 HOURS PRN
Status: DISCONTINUED | OUTPATIENT
Start: 2020-01-01 | End: 2020-01-01

## 2020-01-01 RX ORDER — ALBUTEROL SULFATE 0.83 MG/ML
3 SOLUTION RESPIRATORY (INHALATION)
Status: DISCONTINUED | OUTPATIENT
Start: 2020-01-01 | End: 2020-01-01 | Stop reason: HOSPADM

## 2020-01-01 RX ORDER — HYDROMORPHONE HYDROCHLORIDE 1 MG/ML
1 SOLUTION ORAL
Qty: 30 ML | Refills: 0 | Status: SHIPPED | OUTPATIENT
Start: 2020-01-01

## 2020-01-01 RX ORDER — METOPROLOL TARTRATE 25 MG/1
25 TABLET, FILM COATED ORAL 2 TIMES DAILY
Status: DISCONTINUED | OUTPATIENT
Start: 2020-01-01 | End: 2020-01-01

## 2020-01-01 RX ORDER — ATROPINE SULFATE 10 MG/ML
4 SOLUTION/ DROPS OPHTHALMIC
Qty: 1 BOTTLE | Refills: 1 | Status: SHIPPED | OUTPATIENT
Start: 2020-01-01

## 2020-01-01 RX ORDER — POTASSIUM CHLORIDE 1500 MG/1
20-40 TABLET, EXTENDED RELEASE ORAL
Status: DISCONTINUED | OUTPATIENT
Start: 2020-01-01 | End: 2020-01-01

## 2020-01-01 RX ORDER — HEPARIN SODIUM 10000 [USP'U]/100ML
600 INJECTION, SOLUTION INTRAVENOUS CONTINUOUS
Status: DISCONTINUED | OUTPATIENT
Start: 2020-01-01 | End: 2020-01-01

## 2020-01-01 RX ORDER — LEVOTHYROXINE SODIUM 88 UG/1
88 TABLET ORAL DAILY
COMMUNITY
Start: 2020-01-01 | End: 2020-01-01

## 2020-01-01 RX ORDER — NALOXONE HYDROCHLORIDE 0.4 MG/ML
.1-.4 INJECTION, SOLUTION INTRAMUSCULAR; INTRAVENOUS; SUBCUTANEOUS
Status: DISCONTINUED | OUTPATIENT
Start: 2020-01-01 | End: 2020-01-01 | Stop reason: HOSPADM

## 2020-01-01 RX ORDER — METOPROLOL SUCCINATE 25 MG/1
25 TABLET, EXTENDED RELEASE ORAL 3 TIMES DAILY
Status: DISCONTINUED | OUTPATIENT
Start: 2020-01-01 | End: 2020-01-01

## 2020-01-01 RX ORDER — IPRATROPIUM BROMIDE AND ALBUTEROL SULFATE 2.5; .5 MG/3ML; MG/3ML
3 SOLUTION RESPIRATORY (INHALATION) 4 TIMES DAILY
Status: DISCONTINUED | OUTPATIENT
Start: 2020-01-01 | End: 2020-01-01

## 2020-01-01 RX ORDER — POTASSIUM CHLORIDE 1500 MG/1
20 TABLET, EXTENDED RELEASE ORAL DAILY
Status: DISCONTINUED | OUTPATIENT
Start: 2020-01-01 | End: 2020-01-01

## 2020-01-01 RX ORDER — METOPROLOL TARTRATE 1 MG/ML
5 INJECTION, SOLUTION INTRAVENOUS ONCE
Status: COMPLETED | OUTPATIENT
Start: 2020-01-01 | End: 2020-01-01

## 2020-01-01 RX ORDER — LIDOCAINE 40 MG/G
CREAM TOPICAL
Status: DISCONTINUED | OUTPATIENT
Start: 2020-01-01 | End: 2020-01-01 | Stop reason: HOSPADM

## 2020-01-01 RX ORDER — POTASSIUM CHLORIDE 750 MG/1
20 TABLET, EXTENDED RELEASE ORAL DAILY
Status: DISCONTINUED | OUTPATIENT
Start: 2020-01-01 | End: 2020-01-01

## 2020-01-01 RX ORDER — ACETAMINOPHEN 325 MG/1
650 TABLET ORAL EVERY 4 HOURS PRN
Qty: 30 TABLET | Refills: 1 | Status: SHIPPED | OUTPATIENT
Start: 2020-01-01

## 2020-01-01 RX ORDER — MORPHINE SULFATE 2 MG/ML
1 INJECTION, SOLUTION INTRAMUSCULAR; INTRAVENOUS
Status: DISCONTINUED | OUTPATIENT
Start: 2020-01-01 | End: 2020-01-01 | Stop reason: ALTCHOICE

## 2020-01-01 RX ORDER — ATROPINE SULFATE 10 MG/ML
1-2 SOLUTION/ DROPS OPHTHALMIC
Status: DISCONTINUED | OUTPATIENT
Start: 2020-01-01 | End: 2020-01-01 | Stop reason: HOSPADM

## 2020-01-01 RX ORDER — ONDANSETRON 2 MG/ML
4 INJECTION INTRAMUSCULAR; INTRAVENOUS EVERY 6 HOURS PRN
Status: DISCONTINUED | OUTPATIENT
Start: 2020-01-01 | End: 2020-01-01 | Stop reason: HOSPADM

## 2020-01-01 RX ORDER — ACETAMINOPHEN 325 MG/1
650 TABLET ORAL EVERY 4 HOURS PRN
Status: DISCONTINUED | OUTPATIENT
Start: 2020-01-01 | End: 2020-01-01 | Stop reason: HOSPADM

## 2020-01-01 RX ORDER — FAMOTIDINE 20 MG/1
20 TABLET, FILM COATED ORAL DAILY
Qty: 30 TABLET | Refills: 1 | Status: SHIPPED | OUTPATIENT
Start: 2020-01-01

## 2020-01-01 RX ORDER — GLIPIZIDE 10 MG/1
1-2 TABLET ORAL EVERY 8 HOURS PRN
Status: DISCONTINUED | OUTPATIENT
Start: 2020-01-01 | End: 2020-01-01 | Stop reason: HOSPADM

## 2020-01-01 RX ORDER — DIGOXIN 125 MCG
125 TABLET ORAL DAILY
Status: DISCONTINUED | OUTPATIENT
Start: 2020-01-01 | End: 2020-01-01

## 2020-01-01 RX ORDER — BISACODYL 10 MG
10 SUPPOSITORY, RECTAL RECTAL DAILY PRN
Status: DISCONTINUED | OUTPATIENT
Start: 2020-01-01 | End: 2020-01-01 | Stop reason: HOSPADM

## 2020-01-01 RX ORDER — ACETAMINOPHEN 650 MG/1
650 SUPPOSITORY RECTAL EVERY 4 HOURS PRN
Qty: 30 SUPPOSITORY | Refills: 1 | Status: SHIPPED | OUTPATIENT
Start: 2020-01-01

## 2020-01-01 RX ORDER — NICOTINE POLACRILEX 4 MG
15-30 LOZENGE BUCCAL
Status: DISCONTINUED | OUTPATIENT
Start: 2020-01-01 | End: 2020-01-01 | Stop reason: HOSPADM

## 2020-01-01 RX ORDER — LEVOTHYROXINE SODIUM 75 UG/1
75 TABLET ORAL DAILY
Status: DISCONTINUED | OUTPATIENT
Start: 2020-01-01 | End: 2020-01-01

## 2020-01-01 RX ORDER — AMOXICILLIN 250 MG
2 CAPSULE ORAL 2 TIMES DAILY
Status: DISCONTINUED | OUTPATIENT
Start: 2020-01-01 | End: 2020-01-01

## 2020-01-01 RX ORDER — MINERAL OIL/HYDROPHIL PETROLAT
OINTMENT (GRAM) TOPICAL EVERY 8 HOURS PRN
Status: DISCONTINUED | OUTPATIENT
Start: 2020-01-01 | End: 2020-01-01 | Stop reason: HOSPADM

## 2020-01-01 RX ORDER — HYDROCODONE BITARTRATE AND ACETAMINOPHEN 5; 325 MG/1; MG/1
1-2 TABLET ORAL EVERY 4 HOURS PRN
Status: DISCONTINUED | OUTPATIENT
Start: 2020-01-01 | End: 2020-01-01 | Stop reason: ALTCHOICE

## 2020-01-01 RX ORDER — FAMOTIDINE 20 MG/1
20 TABLET, FILM COATED ORAL DAILY
Status: DISCONTINUED | OUTPATIENT
Start: 2020-01-01 | End: 2020-01-01 | Stop reason: HOSPADM

## 2020-01-01 RX ORDER — CEFTRIAXONE 2 G/1
2 INJECTION, POWDER, FOR SOLUTION INTRAMUSCULAR; INTRAVENOUS EVERY 24 HOURS
Status: DISCONTINUED | OUTPATIENT
Start: 2020-01-01 | End: 2020-01-01

## 2020-01-01 RX ORDER — LORAZEPAM 2 MG/ML
.5-1 INJECTION INTRAMUSCULAR
Status: DISCONTINUED | OUTPATIENT
Start: 2020-01-01 | End: 2020-01-01 | Stop reason: HOSPADM

## 2020-01-01 RX ORDER — OXYCODONE HCL 20 MG/ML
5-7.5 CONCENTRATE, ORAL ORAL
Status: DISCONTINUED | OUTPATIENT
Start: 2020-01-01 | End: 2020-01-01 | Stop reason: HOSPADM

## 2020-01-01 RX ADMIN — DIGOXIN 125 MCG: 125 TABLET ORAL at 12:50

## 2020-01-01 RX ADMIN — MULTIPLE VITAMINS W/ MINERALS TAB 1 TABLET: TAB at 16:22

## 2020-01-01 RX ADMIN — FAMOTIDINE 20 MG: 20 TABLET, FILM COATED ORAL at 08:35

## 2020-01-01 RX ADMIN — CEFTRIAXONE 2 G: 2 INJECTION, POWDER, FOR SOLUTION INTRAMUSCULAR; INTRAVENOUS at 20:22

## 2020-01-01 RX ADMIN — SENNOSIDES AND DOCUSATE SODIUM 2 TABLET: 8.6; 5 TABLET ORAL at 01:38

## 2020-01-01 RX ADMIN — METOPROLOL TARTRATE 25 MG: 25 TABLET ORAL at 01:38

## 2020-01-01 RX ADMIN — FUROSEMIDE 40 MG: 10 INJECTION, SOLUTION INTRAVENOUS at 01:38

## 2020-01-01 RX ADMIN — FUROSEMIDE 40 MG: 10 INJECTION, SOLUTION INTRAVENOUS at 00:53

## 2020-01-01 RX ADMIN — METOPROLOL SUCCINATE 25 MG: 25 TABLET, EXTENDED RELEASE ORAL at 09:02

## 2020-01-01 RX ADMIN — CEFTRIAXONE 2 G: 2 INJECTION, POWDER, FOR SOLUTION INTRAMUSCULAR; INTRAVENOUS at 21:17

## 2020-01-01 RX ADMIN — IOPAMIDOL 80 ML: 755 INJECTION, SOLUTION INTRAVENOUS at 09:34

## 2020-01-01 RX ADMIN — HUMAN ALBUMIN MICROSPHERES AND PERFLUTREN 9 ML: 10; .22 INJECTION, SOLUTION INTRAVENOUS at 15:05

## 2020-01-01 RX ADMIN — IPRATROPIUM BROMIDE AND ALBUTEROL SULFATE 3 ML: .5; 3 SOLUTION RESPIRATORY (INHALATION) at 11:41

## 2020-01-01 RX ADMIN — MULTIPLE VITAMINS W/ MINERALS TAB 1 TABLET: TAB at 17:02

## 2020-01-01 RX ADMIN — DEXTROSE MONOHYDRATE 25 ML: 25 INJECTION, SOLUTION INTRAVENOUS at 08:33

## 2020-01-01 RX ADMIN — SENNOSIDES AND DOCUSATE SODIUM 1 TABLET: 8.6; 5 TABLET ORAL at 22:53

## 2020-01-01 RX ADMIN — LEVOTHYROXINE SODIUM 75 MCG: 75 TABLET ORAL at 09:58

## 2020-01-01 RX ADMIN — SENNOSIDES AND DOCUSATE SODIUM 1 TABLET: 8.6; 5 TABLET ORAL at 12:49

## 2020-01-01 RX ADMIN — DIGOXIN 125 MCG: 125 TABLET ORAL at 11:34

## 2020-01-01 RX ADMIN — Medication 10 MEQ: at 20:03

## 2020-01-01 RX ADMIN — APIXABAN 2.5 MG: 2.5 TABLET, FILM COATED ORAL at 20:22

## 2020-01-01 RX ADMIN — APIXABAN 2.5 MG: 2.5 TABLET, FILM COATED ORAL at 21:11

## 2020-01-01 RX ADMIN — POTASSIUM CHLORIDE 10 MEQ: 1500 TABLET, EXTENDED RELEASE ORAL at 09:02

## 2020-01-01 RX ADMIN — METOPROLOL TARTRATE 12.5 MG: 25 TABLET, FILM COATED ORAL at 12:47

## 2020-01-01 RX ADMIN — IPRATROPIUM BROMIDE AND ALBUTEROL SULFATE 3 ML: .5; 3 SOLUTION RESPIRATORY (INHALATION) at 07:26

## 2020-01-01 RX ADMIN — IPRATROPIUM BROMIDE AND ALBUTEROL SULFATE 3 ML: .5; 3 SOLUTION RESPIRATORY (INHALATION) at 11:17

## 2020-01-01 RX ADMIN — APIXABAN 2.5 MG: 2.5 TABLET, FILM COATED ORAL at 09:01

## 2020-01-01 RX ADMIN — METOPROLOL TARTRATE 25 MG: 25 TABLET ORAL at 10:00

## 2020-01-01 RX ADMIN — SODIUM CHLORIDE 1000 ML: 9 INJECTION, SOLUTION INTRAVENOUS at 22:26

## 2020-01-01 RX ADMIN — IPRATROPIUM BROMIDE AND ALBUTEROL SULFATE 3 ML: .5; 3 SOLUTION RESPIRATORY (INHALATION) at 15:31

## 2020-01-01 RX ADMIN — FUROSEMIDE 40 MG: 10 INJECTION, SOLUTION INTRAVENOUS at 12:47

## 2020-01-01 RX ADMIN — METOPROLOL TARTRATE 25 MG: 25 TABLET ORAL at 21:11

## 2020-01-01 RX ADMIN — THIAMINE HCL TAB 100 MG 100 MG: 100 TAB at 12:49

## 2020-01-01 RX ADMIN — LEVOTHYROXINE SODIUM 88 MCG: 88 TABLET ORAL at 09:02

## 2020-01-01 RX ADMIN — THIAMINE HCL TAB 100 MG 100 MG: 100 TAB at 12:46

## 2020-01-01 RX ADMIN — METOPROLOL TARTRATE 5 MG: 5 INJECTION INTRAVENOUS at 22:29

## 2020-01-01 RX ADMIN — METOPROLOL TARTRATE 5 MG: 5 INJECTION INTRAVENOUS at 12:48

## 2020-01-01 RX ADMIN — APIXABAN 2.5 MG: 2.5 TABLET, FILM COATED ORAL at 12:46

## 2020-01-01 RX ADMIN — APIXABAN 2.5 MG: 2.5 TABLET, FILM COATED ORAL at 12:38

## 2020-01-01 RX ADMIN — METOPROLOL TARTRATE 5 MG: 5 INJECTION INTRAVENOUS at 01:23

## 2020-01-01 RX ADMIN — SENNOSIDES AND DOCUSATE SODIUM 1 TABLET: 8.6; 5 TABLET ORAL at 20:22

## 2020-01-01 RX ADMIN — SENNOSIDES AND DOCUSATE SODIUM 1 TABLET: 8.6; 5 TABLET ORAL at 09:02

## 2020-01-01 RX ADMIN — DIGOXIN 125 MCG: 125 TABLET ORAL at 12:38

## 2020-01-01 RX ADMIN — POTASSIUM CHLORIDE 10 MEQ: 10 TABLET, EXTENDED RELEASE ORAL at 11:34

## 2020-01-01 RX ADMIN — METOPROLOL SUCCINATE 25 MG: 25 TABLET, EXTENDED RELEASE ORAL at 12:49

## 2020-01-01 RX ADMIN — METOPROLOL TARTRATE 5 MG: 5 INJECTION INTRAVENOUS at 04:13

## 2020-01-01 RX ADMIN — CEFTRIAXONE 2 G: 2 INJECTION, POWDER, FOR SOLUTION INTRAMUSCULAR; INTRAVENOUS at 22:53

## 2020-01-01 RX ADMIN — DIGOXIN 125 MCG: 125 TABLET ORAL at 08:36

## 2020-01-01 RX ADMIN — IPRATROPIUM BROMIDE AND ALBUTEROL SULFATE 3 ML: .5; 3 SOLUTION RESPIRATORY (INHALATION) at 19:27

## 2020-01-01 RX ADMIN — SENNOSIDES AND DOCUSATE SODIUM 2 TABLET: 8.6; 5 TABLET ORAL at 09:58

## 2020-01-01 RX ADMIN — SENNOSIDES AND DOCUSATE SODIUM 1 TABLET: 8.6; 5 TABLET ORAL at 08:36

## 2020-01-01 RX ADMIN — Medication 10 MEQ: at 17:53

## 2020-01-01 RX ADMIN — Medication 10 MEQ: at 18:58

## 2020-01-01 RX ADMIN — FUROSEMIDE 40 MG: 10 INJECTION, SOLUTION INTRAVENOUS at 12:39

## 2020-01-01 RX ADMIN — APIXABAN 2.5 MG: 2.5 TABLET, FILM COATED ORAL at 22:53

## 2020-01-01 RX ADMIN — LEVOTHYROXINE SODIUM 88 MCG: 88 TABLET ORAL at 11:25

## 2020-01-01 RX ADMIN — FAMOTIDINE 20 MG: 20 TABLET, FILM COATED ORAL at 12:50

## 2020-01-01 RX ADMIN — LEVOTHYROXINE SODIUM 88 MCG: 88 TABLET ORAL at 12:56

## 2020-01-01 RX ADMIN — THIAMINE HCL TAB 100 MG 100 MG: 100 TAB at 09:02

## 2020-01-01 RX ADMIN — APIXABAN 2.5 MG: 2.5 TABLET, FILM COATED ORAL at 12:49

## 2020-01-01 RX ADMIN — FAMOTIDINE 20 MG: 20 TABLET, FILM COATED ORAL at 09:58

## 2020-01-01 RX ADMIN — Medication 10 MEQ: at 14:38

## 2020-01-01 RX ADMIN — IPRATROPIUM BROMIDE AND ALBUTEROL SULFATE 3 ML: .5; 3 SOLUTION RESPIRATORY (INHALATION) at 19:09

## 2020-01-01 RX ADMIN — METOPROLOL TARTRATE 5 MG: 5 INJECTION INTRAVENOUS at 23:06

## 2020-01-01 RX ADMIN — Medication 10 MEQ: at 16:55

## 2020-01-01 RX ADMIN — METOPROLOL SUCCINATE 12.5 MG: 25 TABLET, EXTENDED RELEASE ORAL at 16:22

## 2020-01-01 RX ADMIN — FAMOTIDINE 20 MG: 20 TABLET, FILM COATED ORAL at 09:01

## 2020-01-01 RX ADMIN — Medication 10 MEQ: at 15:40

## 2020-01-01 RX ADMIN — HEPARIN SODIUM 600 UNITS/HR: 10000 INJECTION, SOLUTION INTRAVENOUS at 01:35

## 2020-01-01 RX ADMIN — AZITHROMYCIN MONOHYDRATE 250 MG: 500 INJECTION, POWDER, LYOPHILIZED, FOR SOLUTION INTRAVENOUS at 00:59

## 2020-01-01 RX ADMIN — Medication 3100 UNITS: at 01:32

## 2020-01-01 RX ADMIN — SODIUM CHLORIDE 500 ML: 9 INJECTION, SOLUTION INTRAVENOUS at 20:45

## 2020-01-01 RX ADMIN — METOPROLOL SUCCINATE 12.5 MG: 25 TABLET, EXTENDED RELEASE ORAL at 22:53

## 2020-01-01 RX ADMIN — LEVOTHYROXINE SODIUM 88 MCG: 88 TABLET ORAL at 08:35

## 2020-01-01 RX ADMIN — FUROSEMIDE 40 MG: 40 TABLET ORAL at 09:02

## 2020-01-01 RX ADMIN — SODIUM CHLORIDE 70 ML: 9 INJECTION, SOLUTION INTRAVENOUS at 09:35

## 2020-01-01 RX ADMIN — AZITHROMYCIN MONOHYDRATE 250 MG: 500 INJECTION, POWDER, LYOPHILIZED, FOR SOLUTION INTRAVENOUS at 00:56

## 2020-01-01 RX ADMIN — AZITHROMYCIN MONOHYDRATE 500 MG: 500 INJECTION, POWDER, LYOPHILIZED, FOR SOLUTION INTRAVENOUS at 02:08

## 2020-01-01 RX ADMIN — METOPROLOL SUCCINATE 12.5 MG: 25 TABLET, EXTENDED RELEASE ORAL at 11:35

## 2020-01-01 RX ADMIN — AZITHROMYCIN MONOHYDRATE 250 MG: 500 INJECTION, POWDER, LYOPHILIZED, FOR SOLUTION INTRAVENOUS at 00:27

## 2020-01-01 RX ADMIN — CEFTRIAXONE 2 G: 2 INJECTION, POWDER, FOR SOLUTION INTRAMUSCULAR; INTRAVENOUS at 22:26

## 2020-01-01 RX ADMIN — FAMOTIDINE 20 MG: 20 TABLET, FILM COATED ORAL at 11:24

## 2020-01-01 RX ADMIN — FUROSEMIDE 40 MG: 10 INJECTION, SOLUTION INTRAVENOUS at 00:56

## 2020-01-01 RX ADMIN — METOPROLOL SUCCINATE 25 MG: 25 TABLET, EXTENDED RELEASE ORAL at 20:22

## 2020-01-01 ASSESSMENT — ENCOUNTER SYMPTOMS
ABDOMINAL PAIN: 1
TROUBLE SWALLOWING: 1
CONSTIPATION: 1
SHORTNESS OF BREATH: 1
SORE THROAT: 1

## 2020-01-01 ASSESSMENT — MIFFLIN-ST. JEOR
SCORE: 1002.45
SCORE: 1019.69
SCORE: 1025.59
SCORE: 1052.35
SCORE: 1051.44
SCORE: 1052.35

## 2020-01-01 ASSESSMENT — ACTIVITIES OF DAILY LIVING (ADL)
DRESS: 0-->INDEPENDENT
TOILETING: 0-->INDEPENDENT
COGNITION: 0 - NO COGNITION ISSUES REPORTED
BATHING: 0-->INDEPENDENT
WHICH_OF_THE_ABOVE_FUNCTIONAL_RISKS_HAD_A_RECENT_ONSET_OR_CHANGE?: AMBULATION
RETIRED_EATING: 0-->INDEPENDENT
RETIRED_COMMUNICATION: 0-->UNDERSTANDS/COMMUNICATES WITHOUT DIFFICULTY
PREVIOUS_RESPONSIBILITIES: MEAL PREP;HOUSEKEEPING;LAUNDRY;MEDICATION MANAGEMENT;FINANCES
TRANSFERRING: 0-->INDEPENDENT
AMBULATION: 0-->INDEPENDENT
SWALLOWING: 0-->SWALLOWS FOODS/LIQUIDS WITHOUT DIFFICULTY
FALL_HISTORY_WITHIN_LAST_SIX_MONTHS: NO

## 2020-02-23 PROBLEM — J18.9 COMMUNITY ACQUIRED PNEUMONIA: Status: ACTIVE | Noted: 2020-01-01

## 2020-02-23 PROBLEM — I73.00 RAYNAUD'S SYNDROME: Status: ACTIVE | Noted: 2019-01-01

## 2020-02-23 PROBLEM — G62.9 PERIPHERAL NEUROPATHY: Status: ACTIVE | Noted: 2019-01-01

## 2020-02-23 PROBLEM — I48.91 ATRIAL FIBRILLATION WITH RVR (H): Status: ACTIVE | Noted: 2020-01-01

## 2020-02-23 PROBLEM — R60.0 BILATERAL LOWER EXTREMITY EDEMA: Status: ACTIVE | Noted: 2020-01-01

## 2020-02-23 PROBLEM — M41.9 SCOLIOSIS: Status: ACTIVE | Noted: 2019-01-01

## 2020-02-23 PROBLEM — R79.89 ELEVATED BRAIN NATRIURETIC PEPTIDE (BNP) LEVEL: Status: ACTIVE | Noted: 2020-01-01

## 2020-02-23 PROBLEM — E03.9 HYPOTHYROIDISM: Status: ACTIVE | Noted: 2019-01-01

## 2020-02-23 NOTE — LETTER
February 23, 2020      To Whom It May Concern:      Lisa Sheridancliff was seen in our Emergency Department today, 02/23/20. She is able to return home by airplane.      Sincerely,        Jared Bobby RN

## 2020-02-24 PROBLEM — J18.9 PNEUMONIA: Status: ACTIVE | Noted: 2020-01-01

## 2020-02-24 NOTE — ED NOTES
[facility]  ED Nurse Handoff Report    ED Chief complaint: Cough; Leg Swelling; and Abdominal Pain      ED Diagnosis:   Final diagnoses:   Pneumonia due to infectious organism, unspecified laterality, unspecified part of lung   Atrial fibrillation with RVR (H)       Code Status:  Admitting MD to assess.      Allergies: No Known Allergies    Patient Story: 3 weeks of SOB, cough, and swelling in bilateral feet spreading up legs.  Also states she is having abd pain: thinks she is maybe constipated  Focused Assessment:  Patient is alert and oriented x 3, calm and cooperative, currently in a-fib RVR at 120-135, two doses of metoprolol have been given, she is otherwise vitally stable, respirations appear slightly labored with some accessory muscle use, on 2L via NC. Pt's pulse ox does not  pt's oxygen saturations. Poor wave form. Pt's hands cold. Hx of raynauds. Attempted to use ear probe pulse ox. Continues to give poor wave form. Pt's skin pink warm and dry. Pt tachypneic with accessory muscle use. Pt placed on 2L o2 via NC.     Treatments and/or interventions provided: IV ABX, IV metoprolol (see MAR).  Patient's response to treatments and/or interventions: Patient remains tachycardic, otherwise stable.     To be done/followed up on inpatient unit:  Monitor     Does this patient have any cognitive concerns?: None    Activity level - Baseline/Home:  Unknown  Activity Level - Current:   Stand with Assist    Patient's Preferred language: English   Needed?: No    Isolation: None  Infection: Not Applicable  Bariatric?: No    Vital Signs:   Vitals:    02/23/20 2200 02/23/20 2215 02/23/20 2230 02/23/20 2300   BP: 104/75 118/87 (!) 132/100 113/79   Pulse: 128 138 161 141   Resp: 18 18 23 20   Temp:       TempSrc:       SpO2:    100%   Weight:       Height:           Cardiac Rhythm:Cardiac Rhythm: Atrial fibrillation    Was the PSS-3 completed:   Yes  What interventions are required if any?                Family Comments: Family member present at the bedside at this time.   OBS brochure/video discussed/provided to patient/family: N/A              Name of person given brochure if not patient: NA              Relationship to patient: NA    For the majority of the shift this patient's behavior was Green.   Behavioral interventions performed were Information and reassurance provided.    ED NURSE PHONE NUMBER: [phone]

## 2020-02-24 NOTE — ED TRIAGE NOTES
3 weeks of SOB, cough, and swelling in bilateral feet spreading up legs.  Also states she is having abd pain: thinks she is maybe constipated

## 2020-02-24 NOTE — ED NOTES
Pt's pulse ox does not  pt's oxygen saturations. Poor wave form. Pt's hands cold. Hx of raynauds. Attempted to use ear probe pulse ox. Continues to give poor wave form. Pt's skin pink warm and dry. Pt tachypneic. Pt placed on 2L o2 via NC.

## 2020-02-24 NOTE — ED PROVIDER NOTES
History     Chief Complaint:  Cough; Leg Swelling; and Abdominal Pain      HPI   Lisa Damon is a 84 year old female  who presents to the emergency department for evaluation of shortness of breath, cough, leg swelling, and abdominal pain. The leg swelling started 2 days ago and her shortness of breath has gotten progressively worse over the last week. The patient states that she feels the abdomen pain more with her bowel movement, and then she has been wuite constipated. The patient reports shortness of breath, difficulty swallowing, chest pain, leg swelling, sore throat, cold legs and feet, abdominal pain, constipation, and gait problem. These symptoms are all new for her, prompting her presentation today.    PE and DVT Risk Factors:  Personal hx of PE/DVT:  Negative  Family hx of PE/DVT:  Negative  Recent travel:    Negative  Recent surgery:   Negative  Other immobilizations:  Negative  Hx cancer:    Negative    Allergies:  No known drug allergies     Medications:    Lipitor  Levothyroxine    Past Medical History:    Hypothyroidism  Peripheral neuropathy  Raynaud's syndrome  Scoliosis  TIA  Arthritis  Cholesteatoma  Hypothyroidism    Past Surgical History:    Back surgery  Colonoscopy  Exam under anesthesia ear(s)  Hysterectomy  Diskectomy  Bilateral shoulder arthroplasty  Tonsillectomy  Tympanoplasty x2    Family History:    No past pertinent family history.    Social History:  The patient presents today with her daughter.  Former smoker  Negative for alcohol use.  Negative for drug use.  Marital Status:  Single    Review of Systems   HENT: Positive for sore throat and trouble swallowing.    Respiratory: Positive for shortness of breath.    Cardiovascular: Positive for chest pain and leg swelling.   Gastrointestinal: Positive for abdominal pain and constipation.   Musculoskeletal: Positive for gait problem.        Cold legs and feet     10 point review of systems performed and is negative except as above  "and in HPI.       Physical Exam     Patient Vitals for the past 24 hrs:   BP Temp Temp src Heart Rate Resp SpO2 Height Weight   02/23/20 1948 -- -- -- -- -- 92 % -- --   02/23/20 1946 135/88 97.8  F (36.6  C) Temporal 82 20 -- 1.778 m (5' 10\") 51.7 kg (114 lb)     Physical Exam  General: Very thin, laying on the gurney, appears uncomfortable  Head:  The scalp, face, and head appear normal  Mouth/Throat: Mucus membranes are moist  CV:  Rapid and irregular rate.     Normal S1 and S2  No pathological murmur   Resp:  Breath sounds diminished throughout.  GI:  Abdomen is soft, no rigidity    No tenderness to palpation. No guarding. No rebound.  MS:  Normal motor assessment of all extremities.    Good capillary refill noted.    Bilateral lower extremity swelling, No redness or excess warmth.  Skin:  Bilateral LEs cool below the knees, which is not new.  No rash or lesions noted.  Neuro:   Speech is normal and fluent. No apparent deficit.  Psych: Awake. Alert.  Normal affect.      Appropriate interactions.      Emergency Department Course     ECG:  Time: 2022  Vent. Rate 151 bpm. MS interval *. QRS duration 74. QT/QTc 292/462. P-R-T axis * 82 -12.  Atrial fibrillation with rapid ventricular response  Abnormal QRS-T angle, consider primary T wave abnormality  Abnormal ECG  Read time: 2030    Imaging:  Radiology findings were communicated with the patient who voiced understanding of the findings.    US Lower Extremity venous Duplex Bilateral:  No DVT demonstrated.  As per radiology.    XR Chest 2 Views:  Suspected consolidation in the right upper lobe. Bilateral  fibrosis noted. Additional infiltrate not excluded.  As per radiology.    Laboratory:  Laboratory findings were communicated with the patient who voiced understanding of the findings.    CBC: WBC 13.0 (H) o/w WNL. (HGB 12.9, )   CMP: Glucose 109 (H), BUN 31 (H) o/w WNL (Creatinine 0.81)    Lactic acid whole blood 2.0    Troponin I <0.015    TSH with free T4 " reflex 2.49    Nt probnp inpatient (BNP) 84118 (H)     Influenza A/B antigen: Influenza A Negative, Influenza B Negative     Interventions:  2045 NS 0.5L IV    Emergency Department Course:    1954 Nursing notes and vitals reviewed.    2003 I performed an exam of the patient as documented above.     2022 EKG obtained as noted above.    2037 IV was inserted and blood was drawn for laboratory testing, results above.    2048 The patient was sent for a XR Chest 2 Views while in the emergency department, results above.     2050 A nasopharyngeal sample was obtained for laboratory testing as documented above.    2102 The patient was sent for a US Lower Extremity Venous Duplex Bilateral while in the emergency department, results above.     2319 I spoke with Dr. Rodriguez of the Hospitalist service from [facility] regarding patient's presentation, findings, and plan of care.    Findings and plan explained to the Patient who consents to admission. Discussed the patient with Dr. Rodriguez, who will [facility] the [facility] for further monitoring, evaluation, and treatment.    Impression & Plan     Medical Decision Making:  Lisa Damon is a 84 year old female who presents to the emergency department today with multiple complaints.  She was found to have pneumonia as well as A. fib with RVR.  Additionally she has had lower extremity edema and elevated BNP concerning for heart failure as well.  Patient's rate improved with Lopressor.  IV antibiotics ordered.  Fluids were given as the patient appears endovascularly dry and although she does have lower extremity edema she is also tachycardic with a source of infection.  Finding the appropriate balance of fluids in the setting of heart failure with infection and A. fib with RVR will be somewhat challenging.  Patient will be [facility] for careful management of her pneumonia, heart failure, and atrial fibrillation with RVR.    Discharge Diagnosis:    ICD-10-CM     1. Pneumonia due to infectious organism, unspecified laterality, unspecified part of lung J18.9    2. Atrial fibrillation with RVR (H) I48.91      Disposition:  The patient is admitted into the care of Dr. Rodriguez.     Scribe Disclosure:  I, Gildardo Fleming, am serving as a scribe at 8:03 PM on 2/23/2020 to document services personally performed by Maryjo Cano MD based on my observations and the provider's statements to me.        Maryjo Cano MD  02/24/20 0333

## 2020-02-24 NOTE — PLAN OF CARE
Pt A&Ox4 w/ VSS ex soft BP at times. Afebrile. 2L O2 via NC. Tele is AFib RVR- which improved somewhat overnight. Metoprolol IV scheduled. Lasix given. LA of 4.6- RRT paged & blood cultures ordered. Pt asymptomatic. Will continue POC.

## 2020-02-24 NOTE — H&P
[facility]    History and Physical  Hospitalist       Date of Admission:  2/23/2020    Assessment & Plan   Lisa Damon is a 84 year old female with a history of Raynaud's syndrome, TIA presents with increased shortness of breath.    Active Problems:    Atrial fibrillation with RVR (H)    Elevated brain natriuretic peptide (BNP) level    Bilateral lower extremity edema  --- Patient presented with acute onset of shortness of breath and bilateral lower extremity edema, further work-up indicated possible community-acquired pneumonia along with elevated BNP.  Her last echocardiogram was in 2019 and it did show ejection fraction at 60% age with a mild mitral regurgitation.  Further evaluation at that time with a Holter did indicate an nonsustained V. tach, she was recommended to have ischemic work-up, I have not seen the results of that test.  ---Patient was diagnosed with A. fib with RVR on presentation at the rate of 150 bpm, she was given 2 doses of metoprolol and the heart rate did come down, since there is a doubt whether patient could have congestive heart failure will hold off on diltiazem and try to rate control with metoprolol.  --Admit to cardiac telemetry, continue IV metoprolol, started on heparin drip.  Will obtain another echocardiogram since the last one was a year ago, will cycle troponin to evaluate for any recent ischemic events.  Will request cardiology input, patient had already established with cardiology service here.  --We will get TSH and other electrolytes checked and replaced.  --- Continue daily weights, intake/output, started on IV Lasix 40 mg twice daily, closely monitor electrolytes as well as creatinine.  --- Doppler ultrasound of bilateral lower extremity was obtained due to increased to swelling which is acute and it is negative for DVT.    Community acquired pneumonia  Acute hypoxic respiratory failure  --- Admit patient to med telemetry, started on IV Rocephin and  Zithromax, sputum and blood cultures are pending, wean oxygen down as possible, added albuterol and DuoNeb nebulizations and expectorants.  --- Chest x-ray did indicate bilateral fibrosis which seems to be a new diagnosis, will obtain CT scan for further evaluation.    Hypothyroidism  --- Continue PTA Synthroid    Peripheral neuropathy    Raynaud's syndrome    Scoliosis  --- Patient does have significant drain also on bilateral lower extremities and upper extremities, knee down her legs are very cold and there is possible cyanosis on lower extremities with erythema.  Chronic issue as per daughter.  No history of autoimmune illness other than raynauds, daughter has history of multiple sclerosis and Raynaud's.  A sister has idiopathic peripheral neuropathy.  History of TIA and NSVT  --- Continue statin and aspirin    DVT Prophylaxis: Heparin drip  Code Status: DNR/DNI    Disposition: Expected discharge in 2 to 3 days    Dayanara Rodriguez MD    Primary Care Physician   Pb Andrews    Chief Complaint   Shortness of breath 1 week    History is obtained from the patient  Family and medical records    History of Present Illness   Lisa Damon is a 84 year old female with a history of peripheral neuropathy, hypothyroidism, Raynaud's syndrome, scoliosis presents to the emergency department with complaints of increased shortness of breath, her symptoms started 2 days ago, worse with exertion, does not have any relation with her position, no orthopnea or PND.  Patient does have a remote history of smoking, quit 56 years ago, denies any history of COPD.  Patient or the family is not aware whether she has crest syndrome, she has significant raynauds on bilateral upper and lower extremities.  she had noticed leg swelling on bilateral lower extremities 2 days ago along with the shortness of breath, denies any fever or chills, she complains about constipation, mentions that she can did not have any bowel movement for 2  days, denies any abdominal pain, she does not have any chest pain, shortness of breath present, she has cold hands and feet secondary to the Raynaud's syndrome, she complains about unstable gait, had multiple falls recently.  She lives alone and occasionally uses the walker, does not drive.    In patient had history of TIA in the past and was worked up here we have an echocardiogram from 2019 showing good ejection fraction, she presented with a BNP of 12,000 and A. fib with RVR.  Chest x-ray did show pneumonia.  During her evaluation for TIA with a Holter she was found to have nonsustained V. tach, plan was to obtain stress test for ischemia evaluation and she had met with electrophysiologist .  Patient did not mention any other arrhythmias since then, and echocardiogram at that time showed an ejection fraction of 60% age without any indication of diastolic dysfunction.  She received 2 doses of metoprolol in the ER, diltiazem was deferred due to concern about elevated BNP and possible heart failure.  Troponins the first set has been negative so far.  EKG showed A. fib with RVR.  Patient needed 2 to 3 L of oxygen since admission.  Patient would like to be DNR/DNI, this was discussed with daughter present.  She does not have a living will and wanted us to arrange for that to be sorted out this admission if possible.    Past Medical History    I have reviewed this patient's medical history and updated it with pertinent information if needed.   Past Medical History:   Diagnosis Date     Arthritis      Cholesteatoma      Hypothyroidism 3/25/2019     Peripheral neuropathy      Peripheral neuropathy 3/25/2019     Raynaud's syndrome      Scoliosis 3/25/2019     Scoliosis (and kyphoscoliosis), idiopathic      Unspecified hypothyroidism        Past Surgical History   I have reviewed this patient's surgical history and updated it with pertinent information if needed.  Past Surgical History:   Procedure Laterality Date      BACK SURGERY      L4-5 FUSION     COLONOSCOPY N/A 10/6/2016    Procedure: COLONOSCOPY;  Surgeon: Cal Timmons MD;  Location:  GI     EXAM UNDER ANESTHESIA EAR(S) Right 2015    Procedure: EXAM UNDER ANESTHESIA EAR(S);  Surgeon: Wan Suh MD;  Location: [facility]     GYN SURGERY      HYSTERECTOMY     L4-5      DISKECTOMY     ORTHOPEDIC SURGERY      BILATERAL SHOULDER ARTHOPLASTY     TONSILLECTOMY       TYMPANOPLASTY Right 2015    Procedure: TYMPANOPLASTY;  Surgeon: Wan Suh MD;  Location: [facility]     TYMPANOPLASTY Right 2017    Procedure: TYMPANOPLASTY;  RIGHT TYMPANOPLASTY, REVISION PORP;  Surgeon: Wan Suh MD;  Location: [facility]       Prior to Admission Medications   Prior to Admission Medications   Prescriptions Last Dose Informant Patient Reported? Taking?   atorvastatin (LIPITOR) 10 MG tablet   No No   Si tablet (10 mg) by Oral or NG Tube route daily   calcium-vitamin D 500-125 MG-UNIT TABS   Yes No   Sig: Take 1 tablet by mouth daily    levothyroxine (SYNTHROID/LEVOTHROID) 75 MCG tablet   Yes No   Sig: Take 75 mcg by mouth daily   multivitamin, therapeutic with minerals (MULTI-VITAMIN) TABS tablet   Yes No   Sig: Take 1 tablet by mouth daily   order for DME   No No   Sig: Equipment being ordered: Walker Wheels () and Walker ()  Treatment Diagnosis: Difficulty ambulating      Facility-Administered Medications: None     Allergies   No Known Allergies    Social History   I have reviewed this patient's social history and updated it with pertinent information if needed. Lisa MATTHEW Marisol  reports that she quit smoking about 55 years ago. Her smoking use included cigarettes. She started smoking about 60 years ago. She has a 2.50 pack-year smoking history. She has never used smokeless tobacco. She reports that she does not drink alcohol or use drugs.    Family History   I have reviewed this patient's family history and updated it with pertinent information  if needed.   Patient's daughter has Raynaud's phenomena and multiple sclerosis  Review of Systems   The 10 point Review of Systems is negative other than noted in the HPI or here.     Physical Exam   Temp: 97.8  F (36.6  C) Temp src: Temporal BP: 113/79 Pulse: 141 Heart Rate: 112 Resp: 20 SpO2: 100 % O2 Device: Nasal cannula Oxygen Delivery: 2 LPM  Vital Signs with Ranges  Temp:  [97.8  F (36.6  C)] 97.8  F (36.6  C)  Pulse:  [128-165] 141  Heart Rate:  [] 112  Resp:  [18-30] 20  BP: (104-135)/() 113/79  SpO2:  [90 %-100 %] 100 %  114 lbs 0 oz    Constitutional: Awake, alert, cooperative, no apparent distress.  Eyes: Conjunctiva and pupils examined and normal.  HEENT: Moist mucous membranes, normal dentition.  Respiratory: Bilateral basilar crackles noted, right upper lobe crackles noted, scattered wheezes noted  Cardiovascular: Regular rate and rhythm, normal S1 and S2, and no murmur noted.  GI: Soft, non-distended, non-tender, normal bowel sounds.  Lymph/Hematologic: No anterior cervical or supraclavicular adenopathy.  Skin: No rashes, no cyanosis, no edema.  Bilateral lower extremity 2+ pitting edema noted, toes are cyanotic looking.  Lower extremities are cold from knee downwards  Musculoskeletal: No joint swelling, erythema or tenderness.  Neurologic: Cranial nerves 2-12 intact, normal strength and sensation.  Psychiatric: Alert, oriented to person, place and time, no obvious anxiety or depression.    Data   Data reviewed today:  I personally reviewed EKG showing A. fib with RVR  Recent Labs   Lab 02/23/20 2037   WBC 13.0*   HGB 12.9   MCV 92         POTASSIUM 4.7   CHLORIDE 100   CO2 30   BUN 31*   CR 0.81   ANIONGAP 5   KIMBERLY 10.1   *   ALBUMIN 3.4   PROTTOTAL 7.5   BILITOTAL 0.7   ALKPHOS 103   ALT 47   AST 35   TROPI <0.015       Imaging:  Recent Results (from the past 24 hour(s))   XR Chest 2 Views    Narrative    CHEST TWO VIEWS 2/23/2020 9:02 PM     HISTORY: Chest  pain.    COMPARISON: None.       Impression    IMPRESSION: Suspected consolidation in the right upper lobe. Bilateral  fibrosis noted. Additional infiltrate not excluded.    OLIVIA COWAN MD   US Lower Extremity Venous Duplex Bilateral    Narrative    ULTRASOUND VENOUS LOWER EXTREMITY BILATERAL 2/23/2020 9:27 PM     HISTORY: Swelling, shortness of breath.    COMPARISON: None.    TECHNIQUE: Ultrasound gray scale, Color Doppler flow, and spectral  Doppler waveform analysis performed.    FINDINGS: The bilateral common femoral, superficial femoral, popliteal  and posterior tibial veins are patent and fully compressible and  demonstrate normal venous Doppler flow. The visualized greater  saphenous veins are negative for thrombus.      Impression    IMPRESSION: No DVT demonstrated.    OLIVIA COWAN MD

## 2020-02-24 NOTE — PHARMACY-ADMISSION MEDICATION HISTORY
Pharmacy Medication History  Admission medication history interview status for the 2/23/2020  admission is complete. See EPIC admission navigator for prior to admission medications     Medication history sources: Patient and Surescripts  Medication history source reliability: Good  Adherence assessment: Good    Significant changes made to the medication list:        Additional medication history information:       Medication reconciliation completed by provider prior to medication history? Yes    Time spent in this activity: 10 minutes      Prior to Admission medications    Medication Sig Last Dose Taking? Auth Provider   levothyroxine (SYNTHROID/LEVOTHROID) 88 MCG tablet Take 88 mcg by mouth daily 2/23/2020 at am Yes Unknown, Entered By History   trolamine salicylate (ASPERCREME) 10 % external cream Apply topically as needed for moderate pain Apply to feet as needed rarely use Yes Unknown, Entered By History

## 2020-02-24 NOTE — CONSULTS
[facility]    Cardiology Consultation     Date of Admission:  2/23/2020    Assessment & Plan   Lisa Damon is a 84 year old female who was admitted on 2/23/2020.      1.  Subacute, progressive shortness of breath, due in part to heart failure caused by a rapid atrial fibrillation.  Fibrotic lung sounds raise the possibility of interstitial lung disease as well.    2.  New onset rapid atrial fibrillation of unclear duration, possibly months.    3.  History of TIA in January 2019, possibly due to paroxysmal atrial fibrillation at that time.    4.  Hypothyroidism on Synthroid replacement.    5.  Possible pneumonia versus interstitial lung disease..    6.  Chronic debilitating lower extremity neuropathy.  Etiology unknown.    7.  25 to 50 pound weight loss over the course of the last year or more.  She appears significantly malnourished.    Recommendations:    We will recheck an echocardiogram.  Last year at the time of her TIA, she had normal left ventricular size and function with mild to moderate mitral regurgitation.    She is anticoagulated with heparin for stroke prevention.  We will stop that and switch her to low-dose Eliquis.    I will attempt rate control of her atrial fibrillation.  I think rhythm control would be very difficult and inconsistent.  I do not want to expose her to the risks of antiarrhythmic therapy.  Unfortunately, she is relatively hypotensive which may limit our ability to titrate rate control medications.  I will introduce a low-dose of digoxin and continue oral metoprolol with additional titrations as tolerated.  She may require pacemaker and AV node ablation.    Ultimately, she is showing significant failure to thrive and may not be able to live independently at home again.  Her 2 daughters are with her in the hospital and express their wishes for her to be able to get back home.  I indicated that she will likely need at least temporary transitional care but possibly  long-term care.  I would suggest that a palliative care consult would be very appropriate to help discuss goals of care and for everyone to feel included in the discussion.      WANDA GARZA MD  Cardiology    Primary Care Physician   Pb Andrews    Reason for Consult   Reason for consult: I was asked by the hospitalist service to evaluate this patient for shortness of breath and swelling with discovery of new onset atrial fibrillation.    History of Present Illness   Lisa Damon is a 84 year old female who presents with progressive shortness of breath.  The duration is unclear but probably at least a month or 2.  She has been losing weight over the last couple of years, as much as 25 to 50 pounds.  Duration is unclear.  She complains mostly about her chronic bilateral lower extremity neuropathy.  She is unable to feel her feet and has difficulty walking because of this.  However, her daughters indicate that she has been able to take care of her own house, do her cooking, and even make her bed and clean her bathroom on her own until a few days ago.  Recently, her shortness of breath became progressive over weeks to months, associated with lower worsening lower extremity edema, and she has been unable to walk.  She has been in a wheelchair for the last several days.  She has not seen a doctor recently except for a quick thyroid lab test visit a month ago.  She has felt occasional mild intermittent palpitations but cannot be very clear about the sensation of her heart rhythm.  She has not had chest pain lightheadedness or syncope.    When she presented to the emergency room, she was found to be in atrial fibrillation with rapid ventricular response.  She was started on IV metoprolol for heart rate control and IV Lasix for treatment of acute shortness of breath due to congestive heart failure.  By the time she left the emergency room, she was feeling better.  Her breathing was better.  Her heart rate  was slower.  Overnight, she has had continued diuresis and her edema appears much better based on her daughter's report.  Her breathing is somewhat improved but still labored.  She denies feeling palpitations.  Her heart rates remain high, in the 120s with episodes of hypotension.    Patient Active Problem List   Diagnosis     TIA (transient ischemic attack)     Hypothyroidism     Peripheral neuropathy     Raynaud's syndrome     Scoliosis     Atrial fibrillation with RVR (H)     Bilateral lower extremity edema     Elevated brain natriuretic peptide (BNP) level     Community acquired pneumonia     Pneumonia       Past Medical History   I have reviewed this patient's medical history and updated it with pertinent information if needed.   Past Medical History:   Diagnosis Date     Arthritis      Cholesteatoma      Hypothyroidism 3/25/2019     Peripheral neuropathy      Peripheral neuropathy 3/25/2019     Raynaud's syndrome      Scoliosis 3/25/2019     Scoliosis (and kyphoscoliosis), idiopathic      Unspecified hypothyroidism        Past Surgical History   I have reviewed this patient's surgical history and updated it with pertinent information if needed.  Past Surgical History:   Procedure Laterality Date     BACK SURGERY      L4-5 FUSION     COLONOSCOPY N/A 10/6/2016    Procedure: COLONOSCOPY;  Surgeon: Cal Timmons MD;  Location:  GI     EXAM UNDER ANESTHESIA EAR(S) Right 8/6/2015    Procedure: EXAM UNDER ANESTHESIA EAR(S);  Surgeon: Wan Suh MD;  Location: [facility]     GYN SURGERY      HYSTERECTOMY     L4-5      DISKECTOMY     ORTHOPEDIC SURGERY      BILATERAL SHOULDER ARTHOPLASTY     TONSILLECTOMY       TYMPANOPLASTY Right 8/6/2015    Procedure: TYMPANOPLASTY;  Surgeon: Wan Suh MD;  Location: [facility]     TYMPANOPLASTY Right 11/16/2017    Procedure: TYMPANOPLASTY;  RIGHT TYMPANOPLASTY, REVISION PORP;  Surgeon: Wan Suh MD;  Location: [facility]       Prior to Admission Medications    Prior to Admission Medications   Prescriptions Last Dose Informant Patient Reported? Taking?   atorvastatin (LIPITOR) 10 MG tablet   No No   Si tablet (10 mg) by Oral or NG Tube route daily   calcium-vitamin D 500-125 MG-UNIT TABS   Yes No   Sig: Take 1 tablet by mouth daily    levothyroxine (SYNTHROID/LEVOTHROID) 75 MCG tablet   Yes No   Sig: Take 75 mcg by mouth daily   multivitamin, therapeutic with minerals (MULTI-VITAMIN) TABS tablet   Yes No   Sig: Take 1 tablet by mouth daily   order for DME   No No   Sig: Equipment being ordered: Walker Wheels () and Walker ()  Treatment Diagnosis: Difficulty ambulating      Facility-Administered Medications: None     Current Facility-Administered Medications   Medication Dose Route Frequency     [START ON 2020] azithromycin  250 mg Intravenous Q24H     cefTRIAXone  2 g Intravenous Q24H     famotidine  20 mg Oral Daily     furosemide  40 mg Intravenous Q12H     ipratropium - albuterol 0.5 mg/2.5 mg/3 mL  3 mL Nebulization 4x Daily     levothyroxine  75 mcg Oral Daily     metoprolol  5 mg Intravenous Q6H     metoprolol tartrate  25 mg Oral BID     senna-docusate  1 tablet Oral BID    Or     senna-docusate  2 tablet Oral BID     sodium chloride (PF)  3 mL Intracatheter Q8H     Current Facility-Administered Medications   Medication Last Rate     - MEDICATION INSTRUCTIONS -       HEParin 600 Units/hr (20 0900)     Reason anticoagulant not prescribed for atrial fibrillation       - MEDICATION INSTRUCTIONS -       ACE/ARB/ARNI NOT PRESCRIBED       Allergies   No Known Allergies    Social History    reports that she quit smoking about 55 years ago. Her smoking use included cigarettes. She started smoking about 60 years ago. She has a 2.50 pack-year smoking history. She has never used smokeless tobacco. She reports that she does not drink alcohol or use drugs.    Family History   No family history on file.    Review of Systems   The comprehensive 10 point  "Review of Systems is negative other than noted in the HPI or here.     Physical Exam   Vital Signs with Ranges  Temp:  [94  F (34.4  C)-97.8  F (36.6  C)] 97.5  F (36.4  C)  Pulse:  [105-165] 146  Heart Rate:  [] 117  Resp:  [14-30] 28  BP: ()/() 105/78  SpO2:  [90 %-100 %] 95 %  Wt Readings from Last 4 Encounters:   02/24/20 51.7 kg (114 lb)   05/31/19 54.3 kg (119 lb 12.8 oz)   04/17/19 55.2 kg (121 lb 11.2 oz)   01/14/19 63.5 kg (140 lb)     No intake/output data recorded.      Vitals: /78   Pulse 146   Temp 97.5  F (36.4  C) (Axillary)   Resp 28   Ht 1.778 m (5' 10\")   Wt 51.7 kg (114 lb)   SpO2 95%   BMI 16.36 kg/m      Constitutional: Awake, alert, cooperative, uncomfortable due to increased work of breathing, and appears stated age. Frail, cachectic, malnourished.   Eyes: Lids and lashes normal, pupils equal, round and reactive to light,, sclera clear, conjunctiva normal.  ENT: Normocephalic, without obvious abnormality, atramatic,  gums normal .  Neck: Supple, symmetrical, trachea midline, no adenopathy, thyroid symmetric, not enlarged and no tenderness, skin normal.  Lungs: Breathing appears labored with use of accessory muscles, good air exchange, diffuse, bilateral fine crackles.  Cardiovascular: Irregularly irregular rhythm, normal S1 and S2, no S3 or S4, and no murmur noted.  Abdomen: No scars, normal bowel sounds, soft, non-distended, non-tender, no masses palpated, no hepatosplenomegally.  Musculoskeletal: No redness, warmth, or swelling of the joints.  Neurologic: Awake, alert, oriented to name, place and time.    Neuropsychiatric: Normal affect, mood, orientation, memory and insight.  Skin: No rashes, erythema, pallor, petechia or purpura.  Acrocyanosis of the feet. 1+ symmetric bilateral LE edema to mid calf.     Recent Labs   Lab 02/24/20  0451 02/24/20  0114 02/23/20 2037   TROPI 0.016 <0.015 <0.015       Recent Labs   Lab 02/24/20 0451 02/24/20 0114 " 02/23/20 2037   WBC  --  13.6* 13.0*   HGB  --  12.3 12.9   MCV  --  92 92   PLT  --  335 347   NA  --   --  135   POTASSIUM  --   --  4.7   CHLORIDE  --   --  100   CO2  --   --  30   BUN  --   --  31*   CR  --   --  0.81   GFRESTIMATED  --   --  67   GFRESTBLACK  --   --  78   ANIONGAP  --   --  5   KIMBERLY  --   --  10.1   GLC  --   --  109*   ALBUMIN  --   --  3.4   PROTTOTAL  --   --  7.5   BILITOTAL  --   --  0.7   ALKPHOS  --   --  103   ALT  --   --  47   AST  --   --  35   TROPI 0.016 <0.015 <0.015     Recent Labs   Lab Test 01/14/19  1650   CHOL 195      LDL 79   TRIG 59     Recent Labs   Lab 02/24/20 0114 02/23/20 2037   WBC 13.6* 13.0*   HGB 12.3 12.9   HCT 38.0 38.4   MCV 92 92    347     No results for input(s): PH, PHV, PO2, PO2V, SAT, PCO2, PCO2V, HCO3, HCO3V in the last 168 hours.  Recent Labs   Lab 02/23/20 2037   NTBNPI 12,268*     No results for input(s): DD in the last 168 hours.  No results for input(s): SED, CRP in the last 168 hours.  Recent Labs   Lab 02/24/20  0114 02/23/20 2037    347     Recent Labs   Lab 02/24/20 0114 02/23/20 2037   TSH 1.79 2.49     No results for input(s): COLOR, APPEARANCE, URINEGLC, URINEBILI, URINEKETONE, SG, UBLD, URINEPH, PROTEIN, UROBILINOGEN, NITRITE, LEUKEST, RBCU, WBCU in the last 168 hours.    Imaging:  Recent Results (from the past 48 hour(s))   XR Chest 2 Views    Narrative    CHEST TWO VIEWS 2/23/2020 9:02 PM     HISTORY: Chest pain.    COMPARISON: None.       Impression    IMPRESSION: Suspected consolidation in the right upper lobe. Bilateral  fibrosis noted. Additional infiltrate not excluded.    OLIVIA COWAN MD   US Lower Extremity Venous Duplex Bilateral    Narrative    ULTRASOUND VENOUS LOWER EXTREMITY BILATERAL 2/23/2020 9:27 PM     HISTORY: Swelling, shortness of breath.    COMPARISON: None.    TECHNIQUE: Ultrasound gray scale, Color Doppler flow, and spectral  Doppler waveform analysis performed.    FINDINGS: The bilateral  common femoral, superficial femoral, popliteal  and posterior tibial veins are patent and fully compressible and  demonstrate normal venous Doppler flow. The visualized greater  saphenous veins are negative for thrombus.      Impression    IMPRESSION: No DVT demonstrated.    OLIVIA COWAN MD       Echo:  No results found for this or any previous visit (from the past 4320 hour(s)).

## 2020-02-24 NOTE — PLAN OF CARE
Patient is A/O x4. Turn and repo in bed. VSS, 2L O2. Lung sounds fine crackles in bases, and diminished throughout. Denies pain. 2+ edema in bilat LE. Baseline N/T in feet. Tele A fib RVR. Echo done today, EF 25%. Cardiac diet, but minimal appetite. Purewick for occasional incontinence. Plans to medically manage Afib with digoxin and metoprolol. If unsuccessful potential plans/discussion for Pacer and AV node ablation. Discharge pending.

## 2020-02-24 NOTE — CODE/RAPID RESPONSE
Sepsis Evaluation Progress Note    I was called to see Lisa Damon due to abnormal vital signs triggering the Sepsis SIRS screening alert. She is known to have an infection.     Physical Exam   Vital Signs:  Temp: 94  F (34.4  C) Temp src: Axillary BP: 90/63(side-lying) Pulse: 118 Heart Rate: 101 Resp: 18 SpO2: 95 % O2 Device: Nasal cannula Oxygen Delivery: 2 LPM    Lab:  Lactic Acid   Date Value Ref Range Status   02/23/2020 2.0 0.7 - 2.0 mmol/L Final     Lactate for Sepsis Protocol   Date Value Ref Range Status   02/24/2020 4.6 (HH) 0.7 - 2.0 mmol/L Final     Comment:     Critical Value called to and read back by  AMERICA LEDEZMA RN IN CCU [id number] RK         The patient is at baseline mental status.     The rest of their physical exam is significant for   Physical Exam  Constitutional:       General: She is not in acute distress.     Appearance: She is not toxic-appearing or diaphoretic.   HENT:      Mouth/Throat:      Mouth: Mucous membranes are dry.   Eyes:      Extraocular Movements: Extraocular movements intact.      Conjunctiva/sclera: Conjunctivae normal.   Neck:      Musculoskeletal: Normal range of motion and neck supple.   Cardiovascular:      Rate and Rhythm: Tachycardia present. Rhythm irregular.      Pulses: Normal pulses.      Heart sounds: Normal heart sounds. No murmur. No friction rub. No gallop.    Pulmonary:      Effort: Pulmonary effort is normal. No respiratory distress.      Breath sounds: No wheezing, rhonchi or rales.   Abdominal:      Palpations: Abdomen is soft.   Musculoskeletal:      Right lower leg: Edema present.      Left lower leg: Edema present.   Skin:     General: Skin is dry.      Comments: Cool to touch   Neurological:      General: No focal deficit present.      Mental Status: She is alert and oriented to person, place, and time.   Psychiatric:         Mood and Affect: Mood normal.         Assessment & Plan   Lisa Damon meets SIRS criteria AND has a lactate >2 or other  evidence of acute organ damage.  These vital signs, lab and physical exam findings are consistent with SEVERE SEPSIS.    Sepsis Time-Zero (time severe sepsis diagnosis confirmed): 0600  02/24/20 as this was the time when Lactate resulted, and the level was > 2.0     Anti-infectives (From now, onward)    Start     Dose/Rate Route Frequency Ordered Stop    02/25/20 0000  azithromycin (ZITHROMAX) 250 mg in sodium chloride 0.9 % 250 mL intermittent infusion      250 mg  over 1 Hours Intravenous EVERY 24 HOURS 02/24/20 0033 02/28/20 2359    02/24/20 2200  cefTRIAXone (ROCEPHIN) 2 g vial to attach to  ml bag for ADULTS or NS 50 ml bag for PEDS      2 g  over 30 Minutes Intravenous EVERY 24 HOURS 02/24/20 0033          Current antibiotic coverage is appropriate for source of infection.    3 Hour Severe Sepsis Bundle Completion:  1. Initial Lactic Acid Result:   Recent Labs   Lab Test 02/23/20 2037   LACT 2.0     2. Blood Cultures before Antibiotics: No, antibiotics were started prior to BCx collection b/c waiting for BCx to be collected would have been detrimental to the patient, antibiotics were given prior.  3. Broad Spectrum Antibiotics Administered: already on antibiotics  4. Fluids: Fluid bolus not indicated due to: heart failure    Lab: Repeat lactic acid ordered for 2 hours from now.    Disposition: The patient will remain on the current unit. We will continue to monitor this patient closely..  IVON Alcantara CNP    Sepsis Criteria   Sepsis: 2+ SIRS criteria due to infection  Severe Sepsis: Sepsis AND 1+ new sign of acute organ dysfunction (Note: lactate >2 is organ dysfunction)  Septic Shock: Sepsis AND hypotension despite volume resuscitation with 30 ml/kg crystalloid

## 2020-02-24 NOTE — PROGRESS NOTES
[facility]    Hospitalist Progress Note    Date of Service (when I saw the patient): 02/24/2020    Assessment & Plan   Lisa Damon is a 84 year old female with a history of Raynaud's syndrome, TIA presents with increased shortness of breath.     Atrial fibrillation with RVR   Bilateral lower extremity edema,  Elevated brain natriuretic peptide, concern for acute CHF exacerbation  --- Patient presented with acute onset of shortness of breath and bilateral lower extremity edema, further work-up indicated possible community-acquired pneumonia along with elevated BNP.  Her last echocardiogram was in 2019 and it did show ejection fraction at 60% age with a mild mitral regurgitation.  Further evaluation at that time with a Holter did indicate an nonsustained V. tach, she was recommended to have ischemic work-up, I have not seen the results of that test.  ---Patient was diagnosed with A. fib with RVR on presentation at the rate of 150 bpm, she was given 2 doses of metoprolol and the heart rate did come down, since there is a doubt whether patient could have congestive heart failure will hold off on diltiazem and try to rate control with metoprolol.  --Cardiac telemetry  --continue IV metoprolo  --heparin drip.    --Echocardiogram ordered  --Cardiology consulted  --TSH wnl  --Replace electrolytes, monitor renal function  --Daily weights, intake/output  --Started on IV Lasix 40 mg twice daily  --Doppler ultrasound of bilateral lower extremity was obtained due to increased to swelling which is acute and it is negative for DVT.      Community acquired pneumonia  Acute hypoxic respiratory failure  --Telemetry  --Continue IV Rocephin and Zithromax  -Sputum and blood cultures are pending  --Wean oxygen down as possible  --Added albuterol and DuoNeb nebulizations and expectorants.  --Chest x-ray did indicate bilateral fibrosis which seems to be a new diagnosis  --CT chest:  1.  Airspace disease in the right  upper lobe, right lower lobe, and  left lower lobe. There may be cavitary lesions in the left lung base.  Recommend follow-up imaging to resolution following treatment, perhaps  in 4-6 weeks.  2.  Cardiomegaly with pleural effusions and fluid in the pericardial  recesses.      Hypothyroidism  --- Continue PTA Synthroid      Peripheral neuropathy    Raynaud's syndrome    Scoliosis  --- Patient does have significant drain also on bilateral lower extremities and upper extremities, knee down her legs are very cold and there is possible cyanosis on lower extremities with erythema.  Chronic issue as per daughter.  No history of autoimmune illness other than raynauds, daughter has history of multiple sclerosis and Raynaud's.  A sister has idiopathic peripheral neuropathy.  History of TIA and NSVT  --- Continue statin and aspirin     DVT Prophylaxis: Heparin drip  Code Status: DNR/DNI     Disposition: Expected discharge in at least 2-3 days.  Placement unclear at this time.    Efe Schultzman    Interval History   Pt appears weak on my arrival, fatigued. Low appetite.  Still sob, but maybe a little better.  Denies f/c, cough, cp, abd pain, n/v.     -Data reviewed today: I reviewed all new labs and imaging results over the last 24 hours.     Physical Exam   Temp: 97.5  F (36.4  C) Temp src: Axillary BP: (!) 116/97 Pulse: 146 Heart Rate: 144 Resp: 28 SpO2: 95 % O2 Device: Nasal cannula Oxygen Delivery: 2 LPM  Vitals:    02/23/20 1946 02/24/20 0044   Weight: 51.7 kg (114 lb) 51.7 kg (114 lb)     Vital Signs with Ranges  Temp:  [94  F (34.4  C)-97.8  F (36.6  C)] 97.5  F (36.4  C)  Pulse:  [105-165] 146  Heart Rate:  [] 144  Resp:  [14-30] 28  BP: ()/() 116/97  SpO2:  [90 %-100 %] 95 %  No intake/output data recorded.    Constitutional: Awake, alert, cooperative, no apparent distress.  HEENT: Moist mucous membranes, normal dentition.  Respiratory: Bilateral basilar crackles noted, right upper lobe  crackles noted, scattered wheezes noted  Cardiovascular: Regular rate and rhythm, normal S1 and S2, and no murmur noted.  Skin: No rashes, no cyanosis, no edema.  Bilateral lower extremity 2+ pitting edema noted, toes are cyanotic looking.  Lower extremities are cold from knee downwards  Musculoskeletal: No joint swelling, erythema or tenderness.  Neurologic: Cranial nerves 2-12 intact, normal strength and sensation.    Medications     - MEDICATION INSTRUCTIONS -       HEParin 600 Units/hr (02/24/20 0900)     Reason anticoagulant not prescribed for atrial fibrillation       - MEDICATION INSTRUCTIONS -       ACE/ARB/ARNI NOT PRESCRIBED         [START ON 2/25/2020] azithromycin  250 mg Intravenous Q24H     cefTRIAXone  2 g Intravenous Q24H     famotidine  20 mg Oral Daily     furosemide  40 mg Intravenous Q12H     iopamidol  80 mL Intravenous Once     ipratropium - albuterol 0.5 mg/2.5 mg/3 mL  3 mL Nebulization 4x Daily     levothyroxine  75 mcg Oral Daily     metoprolol  5 mg Intravenous Q6H     metoprolol tartrate  25 mg Oral BID     sodium chloride 0.9 %  70 mL Intravenous Once     senna-docusate  1 tablet Oral BID    Or     senna-docusate  2 tablet Oral BID     sodium chloride (PF)  3 mL Intracatheter Q8H       Data   Recent Labs   Lab 02/24/20  0451 02/24/20  0114 02/23/20  2037   WBC  --  13.6* 13.0*   HGB  --  12.3 12.9   MCV  --  92 92   PLT  --  335 347   NA  --   --  135   POTASSIUM  --   --  4.7   CHLORIDE  --   --  100   CO2  --   --  30   BUN  --   --  31*   CR  --   --  0.81   ANIONGAP  --   --  5   KIMBERLY  --   --  10.1   GLC  --   --  109*   ALBUMIN  --   --  3.4   PROTTOTAL  --   --  7.5   BILITOTAL  --   --  0.7   ALKPHOS  --   --  103   ALT  --   --  47   AST  --   --  35   TROPI 0.016 <0.015 <0.015       Recent Results (from the past 24 hour(s))   XR Chest 2 Views    Narrative    CHEST TWO VIEWS 2/23/2020 9:02 PM     HISTORY: Chest pain.    COMPARISON: None.       Impression    IMPRESSION: Suspected  consolidation in the right upper lobe. Bilateral  fibrosis noted. Additional infiltrate not excluded.    OLIVIA COWAN MD   US Lower Extremity Venous Duplex Bilateral    Narrative    ULTRASOUND VENOUS LOWER EXTREMITY BILATERAL 2/23/2020 9:27 PM     HISTORY: Swelling, shortness of breath.    COMPARISON: None.    TECHNIQUE: Ultrasound gray scale, Color Doppler flow, and spectral  Doppler waveform analysis performed.    FINDINGS: The bilateral common femoral, superficial femoral, popliteal  and posterior tibial veins are patent and fully compressible and  demonstrate normal venous Doppler flow. The visualized greater  saphenous veins are negative for thrombus.      Impression    IMPRESSION: No DVT demonstrated.    OLIVIA COWAN MD

## 2020-02-24 NOTE — PROVIDER NOTIFICATION
DATE:  2/24/2020   TIME OF RECEIPT FROM LAB:  0547  LAB TEST:  Lactic   LAB VALUE:  4.6  RESULTS GIVEN WITH READ-BACK TO (PROVIDER):  Roxie Otoole  TIME LAB VALUE REPORTED TO PROVIDER:   0550-RRT called

## 2020-02-24 NOTE — PLAN OF CARE
OT- Attempted evaluation. Provided education on the role of OT. Pt reporting fatigue and requesting to rest at this time.

## 2020-02-24 NOTE — PROGRESS NOTES
RECEIVING UNIT ED HANDOFF REVIEW    ED Nurse Handoff Report was reviewed by: Adrianne Reed RN on February 24, 2020 at 12:09 AM

## 2020-02-24 NOTE — PROVIDER NOTIFICATION
MD Notification    Notified Person: MD    Notified Person Name: Efe Chandra    Notification Date/Time: 2/24/20 1142    Notification Interaction: Web Page    Purpose of Notification: FYI Lactic down to 3.3.     Orders Received:    Comments:

## 2020-02-25 NOTE — PLAN OF CARE
Heart Failure Care Pathway  GOALS TO BE MET BEFORE DISCHARGE:    1. Decrease congestion and/or edema with diuretic therapy to achieve near      optimal volume status.            Dyspnea improved:  Yes            Edema improved:     Yes        Net I/O and Weights since admission:          01/26 1500 - 02/25 1459  In: 1190 [P.O.:830; I.V.:360]  Out: 1300 [Urine:1300]  Net: -110            Vitals:    02/23/20 1946 02/24/20 0044 02/25/20 0408   Weight: 51.7 kg (114 lb) 51.7 kg (114 lb) 51.6 kg (113 lb 12.8 oz)       2.  O2 sats > 92% on RA or at prior home O2 therapy level.          Current oxygenation status:       SpO2: 97 %         O2 Device: Nasal cannula,  Oxygen Delivery: 3 LPM         Able to wean O2 this shift to keep sats > 92%:  No, please explain: Pt still requiring O2 @ 3 LPM.       Does patient use Home O2? No    3.  Tolerates ambulation and mobility near baseline: No, please explain: Pt is very deconditioned and has severe neuropathy in both feet.        How many times did the patient ambulate with nursing staff this shift? Pt declined to ambulate.    Pt is A&O. Denies pain. Tele shows A fib with RVR - rates . BPs soft. BG 55 this morning. MD notified. Recheck was 109 after 240 ml of orange juice and 25 mg of IV dextrose. Pt asymptomatic. Pt is diuresing large amounts of urine. Expresses concern and distress about incontinence. Provided reassurance and will obtain urine sample. 2+ edema noted in legs and ankles. Pt hesitant to participate in therapies. As of now plan is to discharge to TCU when medically stable.     Calista Trevino RN RN  2/25/2020

## 2020-02-25 NOTE — PROGRESS NOTES
[facility]  Cardiology Progress Note  Date of Service: 02/25/2020  Primary Cardiologist: will be Dr. Milton    Assessment & Plan    Lisa Damon is a 84 year old female with past medical history significant for raynauds syndrome, TIA admitted on 2/23/2020 with increased sob.      Assessment:  1. Acute systolic heart failure and new dx biventricular failure with EF 25% likely tachycardia induced with new dx afib with rvr.    - bilateral pleural effusions   - admit wt 114, current wt 113, dry wt likely 105   - bp has been labile    2.  New dx afib with rvr   - rates elevated in the 100-110s at rest   - CHADS VASC 4 inidicating benefit of anticoatulation, started on Eliquis 2.5 mg bid (based on age and wt)    3.   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  End of Life Discussion- discussed options ranging from aggressive care with procedures, to medical management to comfort care.  Pt tells me she doesn't want to do a lot, she is ok with comfort care and has been healthy most of her life until now.  Ok to dying naturally.  Will review with primary team for next steps.    5.  Malnourished/ cachetic- appreciate dietary assistance      Plan:   1. Lopressor 12.5 mg qid given hypotension  2. Continue IV lasix for now  3. Possible transition to hospice?  Pt is willing, did not discuss with family  4. Will continue to follow with you       Lydia Nieto PA-C  [facility]  Pager: [phone]     Interval History   Tired, sob, does not feel palps.  Does not want aggressive cares, procedures.  Has been healthy and only more recently frail and sick.  Doesn't want to live with this quality of life.      Physical Exam   Temp: 97.2  F (36.2  C) Temp src: Axillary BP: 102/56 Pulse: 96 Heart Rate: 110 Resp: 11 SpO2: 94 % O2 Device: Nasal cannula Oxygen Delivery: 3 LPM  Vitals:    02/23/20 1946 02/24/20 0044 02/25/20 0408   Weight: 51.7 kg (114 lb) 51.7 kg  (114 lb) 51.6 kg (113 lb 12.8 oz)   Elderly, frail, cachectic woman in no acute distress.  Oxygen in place per nasal cannula.  Her heart is irregularly irregular and tachycardic with a 2 out of 6 systolic murmur heard best left sternal border.  Her lungs have bilateral rales in the lower lobes clearer in the upper lobes.  She has 2+ pitting edema to her knees    Medications     - MEDICATION INSTRUCTIONS -       Reason anticoagulant not prescribed for atrial fibrillation       - MEDICATION INSTRUCTIONS -       ACE/ARB/ARNI NOT PRESCRIBED         apixaban ANTICOAGULANT  2.5 mg Oral BID     azithromycin  250 mg Intravenous Q24H     cefTRIAXone  2 g Intravenous Q24H     digoxin  125 mcg Oral Daily     famotidine  20 mg Oral Daily     furosemide  40 mg Intravenous Q12H     ipratropium - albuterol 0.5 mg/2.5 mg/3 mL  3 mL Nebulization 4x Daily     levothyroxine  88 mcg Oral Daily     metoprolol  5 mg Intravenous Q6H     metoprolol tartrate  25 mg Oral BID     senna-docusate  1 tablet Oral BID    Or     senna-docusate  2 tablet Oral BID     sodium chloride (PF)  3 mL Intracatheter Q8H       Data   Most Recent 3 CBC's:  Recent Labs   Lab Test 02/25/20  0526 02/24/20  0114 02/23/20 2037   WBC 12.5* 13.6* 13.0*   HGB 11.7 12.3 12.9   MCV 93 92 92    335 347     Most Recent 3 BMP's:  Recent Labs   Lab Test 02/25/20  0526 02/23/20  2037 01/14/19  1650    135 136   POTASSIUM 3.5 4.7 4.3   CHLORIDE 104 100 103   CO2 30 30 24   BUN 43* 31* 15   CR 1.13* 0.81 0.80   ANIONGAP 7 5 9   KIMBERLY 9.2 10.1 8.9   GLC 66* 109* 85     Most Recent 3 Troponin's:  Recent Labs   Lab Test 02/24/20  1024 02/24/20  0451 02/24/20  0114   TROPI 0.040 0.016 <0.015     Last 24 hours labs reviewed   EKG: (reviewed personally) afib     Imaging: CT chest  1.  Airspace disease in the right upper lobe, right lower lobe, and  left lower lobe. There may be cavitary lesions in the left lung base.  Recommend follow-up imaging to resolution  following treatment, perhaps  in 4-6 weeks.  2.  Cardiomegaly with pleural effusions and fluid in the pericardial  Recesses.    Tele: afib with -110s    Echo:   1. The left ventricle is normal in size. The visual ejection fraction is  estimated at 25%. There is severe global hypokinesia of the left ventricle.  2. The right ventricle is moderately dilated. The right ventricular systolic  function is mild to moderately reduced.  3. There is moderately severe (3+) mitral regurgitation. 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  4. There is moderately severe (3+) tricuspid regurgitation. The right  ventricular systolic pressure is approximated at 30mmHg plus the right atrial  pressure.     Echo from 1/2019 showed EF 60%, normal RV, 1-2+ MR, 1+ TR.    Last ischemic eval: none    Device: none

## 2020-02-25 NOTE — PLAN OF CARE
Heart Failure Care Pathway  GOALS TO BE MET BEFORE DISCHARGE:    1. Decrease congestion and/or edema with diuretic therapy to achieve near      optimal volume status.            Dyspnea improved:  Yes            Edema improved:     Yes        Net I/O and Weights since admission:          01/26 0700 - 02/25 0659  In: 340 [P.O.:240; I.V.:100]  Out: 300 [Urine:300]  Net: 40            Vitals:    02/23/20 1946 02/24/20 0044   Weight: 51.7 kg (114 lb) 51.7 kg (114 lb)       2.  O2 sats > 92% on RA or at prior home O2 therapy level.          Current oxygenation status:       SpO2: 91 %         O2 Device: Nasal cannula,  Oxygen Delivery: 2 LPM         Able to wean O2 this shift to keep sats > 92%:  No, please explain: 2 L NC, poor SP02 wave form, clip on ear, repositioned frequently       Does patient use Home O2? No    3.  Tolerates ambulation and mobility near baseline: No, please explain: turned q2hr, refused chair for dinner        How many times did the patient ambulate with nursing staff this shift? None      Please review the Heart Failure Care Pathway for additional HF goal outcomes.    Ariadna Jain, RN RN  2/24/2020     Neuro:flat, oriented, but forgetful  CV/Rhythm:a fib less than 110  Resp/02:2 L NC  GI/Diet:BM today, poor PO intake  :pure wick and incontinent, IV lasix  Skin/Incisions/Sites:grayson hands and feet, scabs/cracks to Osbaldo great toes. Home dressings removed, cleansed, sween cream applied to feet, now open to air, mepilex sacral placed  Pulses/CMS:1+ BLE, mottled grayson extremities  Edema:1+ BLE  Activity/Falls Risk:fall risk, turn q2hr  Lines/Drains/IVs:PIV x 2  Labs/BGM:BNP elevated, lactic elevated, ef 25% Dr Milton aware  Test/Procedures:echo don  VS/Pain:stable, HR  a fib  DC Plan:social work following will need additional cares, lived alone, daughters involved  Other:pure wick changed, IV antibiotics PNA

## 2020-02-25 NOTE — ACP (ADVANCE CARE PLANNING)
SPIRITUAL HEALTH SERVICES Progress Note  [facility] CCU    Advance Directive Consult Order.  SH discussed ACP w pt and two of her dtrs.  Pt indicated interest in considering completion of a HCD and suggested she has some specific thoughts about EOL care which she may want to document.  Pt's dtrs pledge to support pt if she chooses to complete a HCD.  Pt has no current questions.  SH offered further availability per pt/family need.                                                                                                                                           Shadi Corbett M.Div., [facility]  Staff   Pager [phone]

## 2020-02-25 NOTE — PROGRESS NOTES
SW:  D:  Received a call back from [facility].  They can accept patient on Wednesday or Thursday.  P:  Will continue to follow.      SADE Eubanks, [facility]  Lead   [phone]  [facility]

## 2020-02-25 NOTE — PLAN OF CARE
Heart Failure Care Pathway  GOALS TO BE MET BEFORE DISCHARGE:    1. Decrease congestion and/or edema with diuretic therapy to achieve near      optimal volume status.            Dyspnea improved:  Yes            Edema improved:     Yes        Net I/O and Weights since admission:          01/26 0700 - 02/25 0659  In: 700 [P.O.:350; I.V.:350]  Out: 1300 [Urine:1300]  Net: -600            Vitals:    02/23/20 1946 02/24/20 0044 02/25/20 0408   Weight: 51.7 kg (114 lb) 51.7 kg (114 lb) 51.6 kg (113 lb 12.8 oz)       2.  O2 sats > 92% on RA or at prior home O2 therapy level.          Current oxygenation status:       SpO2: 96 %         O2 Device: Nasal cannula,  Oxygen Delivery: 2 LPM         Able to wean O2 this shift to keep sats > 92%:  No, please explain: still on 2L NC       Does patient use Home O2? No    3.  Tolerates ambulation and mobility near baseline: No, please explain: generalized weakness        How many times did the patient ambulate with nursing staff this shift? 0    Please review the Heart Failure Care Pathway for additional HF goal outcomes.    Pt A and Ox4 overnight, forgetful. Lethargic, but arouses to voice. Good output per purewick. Tolerating 2L NC. Turn and repo q2h, can help with encouragement. Tele a-fib with variable rates 80's-115.   Amanda Seaver, RN RN  2/25/2020

## 2020-02-25 NOTE — CONSULTS
Care Transition Initial Assessment -      Met with: Family (daughter Ashley)   Active Problems:    Hypothyroidism    Peripheral neuropathy    Raynaud's syndrome    Scoliosis    Atrial fibrillation with RVR (H)    Bilateral lower extremity edema    Elevated brain natriuretic peptide (BNP) level    Community acquired pneumonia    Pneumonia       DATA  Lives With: alone   Quality of Family Relationships: involved, supportive  Description of Support System: Supportive, Involved  Who is your support system?: Children  Support Assessment: Adequate family and caregiver support.   Identified issues/concerns regarding health management:    Quality of Family Relationships: involved, supportive     Per social work consult for discharge planning.  Patient was admitted on 2-23-20 with shortness of breath.  The tentative date of discharge is 2-26-20 or 2-27-20.  Reviewed chart and spoke with patient's daughter Ashley regarding discharge plans.  Per patient's daughter's report, patient lives alone in a house.  Patient is fairly independent at baseline.  Patient has a few daughter's who live near by who assist, as needed.  Patient occasionally uses a walker.  Patient has not had therapy as of yet, but it is anticipated that patient will need TCU placement on discharge.  Patient's daughter is asking for a referral to be sent to [place].  Referral sent, via discharge on the double, to check bed availability.      ASSESSMENT  Cognitive Status:  awake and alert  Concerns to be addressed: discharge planning, TCU placement on discharge.     PLAN  Financial costs for the patient includes N/A.  Patient given options and choices for discharge TCU choices.  Patient/family is agreeable to the plan?  Yes  Transportation/person available to transport on day of discharge  is TBD and have they been notified/set up TBD  Patient Goals and Preferences: TCU placement on discharge.  Patient anticipates discharging to:  TCU.    Will confirm a  bed, continue to follow, and assist with a safe discharge plan.      SADE Eubanks, [facility]  Lead   [phone]  [facility]

## 2020-02-25 NOTE — CONSULTS
"CLINICAL NUTRITION SERVICES  -  ASSESSMENT NOTE    RECOMMENDATIONS FOR MD/PROVIDER TO ORDER:   - Consider liberalized diet given malnourished state  - Deferred 2g Na diet ed for CHF at this time   Recommendations Ordered by Registered Dietitian (RD):   - Thiamine 100 mg daily for EF <30%  - Continue supplements with meals (Boost Shake TID w/ meals per RN).   - Changed lunch supplement to strawberry Plus2 Shake (625 kcal, 19 g pro)  - Thera vit M daily    Malnutrition:   % Weight Loss:  Up to 20% in 1 year (non-severe malnutrition)  % Intake:  </= 75% for >/= 1 month (severe malnutrition) --> provided diet recall  Subcutaneous Fat Loss:  Orbital region severe depletion and Upper arm region severe depletion  Muscle Loss:  Temporal region moderate depletion, Clavicle bone region moderate depletion, Acromion bone region moderate depletion and Dorsal hand region moderate-severe depletion  Fluid Retention:  Mild 2+    Malnutrition Diagnosis: Severe malnutrition  In Context of:  Acute illness or injury  Chronic illness or disease     REASON FOR ASSESSMENT  Lisa Damon is a 84 year old female seen by Registered Dietitian for Admission Nutrition Risk Screen for unintentional loss of 10# or more in the past two months and reduced oral intake over the last month  AND Provider Order - Congestive Heart Failure (CHF) - Dietitian to instruct patient on 2 gram sodium diet    NUTRITION HISTORY  - Information obtained from patient/EMR. Daughter at bedside initially during visit.  - Presented to ED for eval of shortness of breath, cough, leg swelling, abdominal pain. Pt also endorses constipation, difficulty swallowing, sore throat, abdominal pain.     - Lisa lives alone, but her kids are very helpful in providing her with food/meals. She tries to eat meals TID +1-2 snacks.   - Sleeps late \"not a morning person\", so first meal is around 11:30.    Breakfast: toast w/ butter and jelly. OJ   Snack: yogurt   Lunch: " "banana   Dinner: leftovers from what her kids brought, chicken, chili. She tries to always eat 1/2 of an avocado, and tries to eat \"a little vegetable\".    Snack: every night she drinks a vanilla Boost or Ensure.   - Appetite has gotten worse and worse Lisa reports, has been eating \"less and less\".     - 25-50# loss in the past year or more per EMR. Patient reported most recent weight at doctor was 114#, \"with clothes on\".   - NKFA    CURRENT NUTRITION ORDERS  Diet Order:     Low Saturated Fat/2400 mg Sodium   No Caffeine    Current Intake/Tolerance:  Had just ordered breakfast - strawberry yogurt, muffin, pears (pt states \"I won't be able to eat half that\".     NUTRITION FOCUSED PHYSICAL ASSESSMENT FOR DIAGNOSING MALNUTRITION)  Yes         Observed:    Muscle wasting (refer to documentation in Malnutrition section) and Subcutaneous fat loss (refer to documentation in Malnutrition section)    Obtained from Chart/Interdisciplinary Team:  Cardiology --> \"heart failure with preserved ejection fraction caused by a rapid atrial fibrillation. Significant FTT.  25 to 50 pound weight loss over the course of the last year or more.  She appears significantly malnourished\"     ECHO --> 25% EF    Mild 2+ edema   BM x1 2/24    ANTHROPOMETRICS  Height: 5' 10\"  Weight: 113 lbs 12.8 oz (51.6 kg)   Body mass index is 16.33 kg/m .  Weight Status:  Underweight BMI <18.5  IBW: 68.2 kg  % IBW: 76%  Weight History: 27# loss indicated in just a little over one year (19%).   Wt Readings from Last 10 Encounters:   02/25/20 51.6 kg (113 lb 12.8 oz)   05/31/19 54.3 kg (119 lb 12.8 oz)   04/17/19 55.2 kg (121 lb 11.2 oz)   01/14/19 63.5 kg (140 lb)   11/16/17 59 kg (130 lb)   10/06/16 63.5 kg (140 lb)   08/06/15 73.2 kg (161 lb 4.8 oz)     LABS  Labs reviewed  Recent Labs   Lab 02/25/20  0822 02/25/20  0741 02/25/20  0526 02/23/20 2037   GLC  --   --  66* 109*   BGM 55* 55*  --   --      Lab Results   Component Value Date    A1C 5.4 " 01/14/2019     MEDICATIONS  Medications reviewed  Lasix 40 mg BID  Bowel program    ASSESSED NUTRITION NEEDS PER APPROVED PRACTICE GUIDELINES:  Dosing Weight 51.6 kg  Estimated Energy Needs: 5330-1467 kcals (30-35 Kcal/Kg)  Justification: repletion, increased expenditure  Estimated Protein Needs:  grams protein (1.5-2 g pro/Kg)  Justification: preservation of lean body mass, repletion  Estimated Fluid Needs: 1 mL/kcal   Justification: maintenance    MALNUTRITION:  % Weight Loss:  Up to 20% in 1 year (non-severe malnutrition)  % Intake:  </= 75% for >/= 1 month (severe malnutrition) --> provided diet recall  Subcutaneous Fat Loss:  Orbital region severe depletion and Upper arm region severe depletion  Muscle Loss:  Temporal region moderate depletion, Clavicle bone region moderate depletion, Acromion bone region moderate depletion and Dorsal hand region moderate-severe depletion  Fluid Retention:  Mild 2+    Malnutrition Diagnosis: Severe malnutrition  In Context of:  Acute illness or injury  Chronic illness or disease    NUTRITION DIAGNOSIS:  Malnutrition related to inadequate oral intake and increased needs as evidenced by diet recall meeting <75% est needs, weight loss of 19% in the past 13 months, e/o severe fat and muscle losses, coding for malnutrition.       NUTRITION INTERVENTIONS  Recommendations / Nutrition Prescription  Consider liberalized diet given malnourished state    Ordered per RD:  - Thiamine 100 mg daily for EF <30%  - Continue supplements with meals (Boost Shake TID w/ meals per RN).   - Changed lunch supplement to strawberry Plus2 Shake (625 kcal, 19 g pro)  - Thera vit M daily     Implementation  Nutrition education: Provided education on supplements, encouraged intakes. Deferred education on 2g Na diet at this time given degree of malnutrition.   Medical Food Supplement and Multivitamin/Mineral: above      Nutrition Goals  Intake of at least 75% meals TID.      MONITORING AND  EVALUATION:  Progress towards goals will be monitored and evaluated per protocol and Practice Guidelines    Brigitte Loyola RD, LD  [facility] [place], [address], 56, [facility]   Pager: [phone]  Weekend Pager: [phone]

## 2020-02-25 NOTE — PROGRESS NOTES
[facility]    Hospitalist Progress Note    Date of Service (when I saw the patient): 02/25/2020    Assessment & Plan   Lisa Damon is a 84 year old female with a history of Raynaud's syndrome, TIA presents with increased shortness of breath and found to have an acute CHF exacerbation and Afib with RVR.  Admitted to CICU.     Atrial fibrillation with RVR   New onset acute systolic CHF exacerbation  Patient presents with acute onset of shortness of breath and bilateral lower extremity edema, further work-up indicated possible community-acquired pneumonia along with elevated BNP.  Her last echocardiogram was in 2019 and it did show ejection fraction at 60% age with a mild mitral regurgitation.  Further evaluation at that time with a Holter did indicate an nonsustained V. tach, she was recommended to have ischemic work-up at that time.  * Chest Xray shows extensive bilateral pulmonary disease, possibly edema, and effusions, R > L. CT chest confirms airways disease, possibly with some bronchiectasis. Weight is still up. Not much edema remaining. HR , variable. BP marginal in 90's/50's. No specific complaints. Heart irregular, lungs with bilateral crackles.  * Patient was diagnosed with A. fib with RVR on presentation at the rate of 150 bpm, she was treated with IV metoprolol and her HR improved.  Doppler ultrasound of bilateral lower extremity was obtained due to increased to swelling which is acute and it is negative for DVT.  TSH wnl.  --Echocardiogram with new dx biventricular failure with EF 25%, likely tachycardia induced with new dx afib with rvr.  Also 3+ MR and TR.  --Cardiology consulted, input greatly appreciated.  --Continuous cardiac telemetry  --Digoxin added  --Transition to metoprolol XL 12.5 mg TID  --Started on IV Lasix 40 mg twice daily  --CHADS VASC is 4 indicating benefit of anticoatulation, started on Eliquis 2.5 mg bid (based on age and wt)  --Heparin drip discontinued.     --Replace electrolytes, monitor renal function  --Daily weights, intake/output  --Continued goals of care discussion as below.    Community acquired pneumonia  Acute hypoxic respiratory failure  --Chest x-ray did indicate bilateral fibrosis which seems to be a new diagnosis  --CT chest:  1.  Airspace disease in the right upper lobe, right lower lobe, and  left lower lobe. There may be cavitary lesions in the left lung base.  Recommend follow-up imaging to resolution following treatment, perhaps  in 4-6 weeks.  2.  Cardiomegaly with pleural effusions and fluid in the pericardial  recesses.  --Telemetry  --Continue IV Rocephin and Zithromax  --Sputum culture to be obtained. Blood cultures NGTD.  --Wean oxygen down as possible  --Continue albuterol and DuoNeb nebulizations and expectorants.    Failure to thrive  Severe protein calorie malnutrition  End of Life Discussion  Pt reports she does not want aggressive cares.  She has lost a lot of her independence over the last few years and reports an unintentional ~60 lbs weight loss.  Discussed options ranging from aggressive care with procedures, to medical management to comfort care.  Pt expressed to me and Cardiology that she doesn't want to do a lot, she is ok with comfort care and has been healthy most of her life until now.   --Palliative care and/or hospice offered to family.  No consults ordered, they will discuss amongst themselves further before making any decisions.    Hypothyroidism  --Continue PTA Synthroid    Peripheral neuropathy  Raynaud's syndrome  Scoliosis  Patient does have significant drain also on bilateral lower extremities and upper extremities, knee down her legs are very cold and there is possible cyanosis on lower extremities with erythema.  Chronic issue as per daughter.  No history of autoimmune illness other than raynauds, daughter has history of multiple sclerosis and Raynaud's.  A sister has idiopathic peripheral neuropathy.    History of  TIA and NSVT  --- Continue statin and aspirin     DVT Prophylaxis: Heparin drip  Code Status: DNR/DNI     Disposition: Expected discharge in 1-2 days, pending cardiology recommendations, possible palliative care consult, and safe dispo.  Placement unclear at this time.    D/w daughter at bedside 2/25    Efejenae Ambriz Goodman Charli SALGUERO    Interval History   Pt appears weak on my arrival, fatigued. Low appetite.  Still sob, but states she is feeling better than yesterday.  Generally weak and very tired.  Does not want a lot done and is reluctant to start more medications.  Denies f/c, cp, abd pain, n/v.     -Data reviewed today: I reviewed all new labs and imaging results over the last 24 hours.     Physical Exam   Temp: 98.4  F (36.9  C) Temp src: Oral BP: 90/60 Pulse: 104 Heart Rate: 116 Resp: 23 SpO2: 92 % O2 Device: Nasal cannula Oxygen Delivery: 3 LPM  Vitals:    02/23/20 1946 02/24/20 0044 02/25/20 0408   Weight: 51.7 kg (114 lb) 51.7 kg (114 lb) 51.6 kg (113 lb 12.8 oz)     Vital Signs with Ranges  Temp:  [97.2  F (36.2  C)-98.4  F (36.9  C)] 98.4  F (36.9  C)  Pulse:  [100-146] 104  Heart Rate:  [] 116  Resp:  [13-28] 23  BP: ()/(56-97) 90/60  SpO2:  [89 %-96 %] 92 %  I/O last 3 completed shifts:  In: 700 [P.O.:350; I.V.:350]  Out: 1300 [Urine:1300]    Constitutional: Awake, alert, oriented to self, place, situation. Cooperative, no apparent distress.  HEENT: Moist mucous membranes, normal dentition.  Respiratory: Bilateral basilar crackles, scattered wheezes noted.  Breathing non-labored.  Cardiovascular: Mildly tachycardic and irregular, and no murmur noted.  Skin: No rashes, no cyanosis, no edema.  Bilateral lower extremity 2+ pitting edema noted, toes are cyanotic looking.  Lower extremities are cold from knee downwards.  Chronic per patient.  Musculoskeletal: No joint swelling, erythema or tenderness.    Medications     - MEDICATION INSTRUCTIONS -       Reason anticoagulant not prescribed for  atrial fibrillation       - MEDICATION INSTRUCTIONS -       ACE/ARB/ARNI NOT PRESCRIBED         apixaban ANTICOAGULANT  2.5 mg Oral BID     azithromycin  250 mg Intravenous Q24H     cefTRIAXone  2 g Intravenous Q24H     digoxin  125 mcg Oral Daily     famotidine  20 mg Oral Daily     furosemide  40 mg Intravenous Q12H     ipratropium - albuterol 0.5 mg/2.5 mg/3 mL  3 mL Nebulization 4x Daily     levothyroxine  88 mcg Oral Daily     metoprolol  5 mg Intravenous Q6H     metoprolol tartrate  25 mg Oral BID     senna-docusate  1 tablet Oral BID    Or     senna-docusate  2 tablet Oral BID     sodium chloride (PF)  3 mL Intracatheter Q8H       Data   Recent Labs   Lab 02/25/20  0526 02/24/20  1024 02/24/20  0451 02/24/20  0114 02/23/20 2037   WBC 12.5*  --   --  13.6* 13.0*   HGB 11.7  --   --  12.3 12.9   MCV 93  --   --  92 92     --   --  335 347     --   --   --  135   POTASSIUM 3.5  --   --   --  4.7   CHLORIDE 104  --   --   --  100   CO2 30  --   --   --  30   BUN 43*  --   --   --  31*   CR 1.13*  --   --   --  0.81   ANIONGAP 7  --   --   --  5   KIMBERLY 9.2  --   --   --  10.1   GLC 66*  --   --   --  109*   ALBUMIN  --   --   --   --  3.4   PROTTOTAL  --   --   --   --  7.5   BILITOTAL  --   --   --   --  0.7   ALKPHOS  --   --   --   --  103   ALT  --   --   --   --  47   AST  --   --   --   --  35   TROPI  --  0.040 0.016 <0.015 <0.015       Recent Results (from the past 24 hour(s))   CT Chest w Contrast    Narrative    CT CHEST W CONTRAST 2/24/2020 9:42 AM    CLINICAL HISTORY: Cough, persistent  TECHNIQUE: CT chest with IV contrast. Multiplanar reformats were  obtained. Dose reduction techniques were used.    CONTRAST: 80 mL Isovue-370    COMPARISON: Radiograph dated 2/23/2020    FINDINGS:   LUNGS AND PLEURA: There are right greater than left pleural effusions.  There are groundglass and consolidative opacities in the right upper  lobe, right lower lobe, and left lower lobe. There is a small  region  of lucency in the left lung base which could represent traction  bronchiectasis more small cavities.    MEDIASTINUM/AXILLAE: The heart is enlarged. The esophagus is not well  assessed. Cannot assess for mediastinal or hilar lymphadenopathy due  to streak artifact and fluid in the pericardial recesses.    UPPER ABDOMEN: Circumscribed hypoattenuating hepatic foci are likely  cysts.    MUSCULOSKELETAL: Degenerative changes a present. There is height loss  of the T12 vertebral body. There is exaggerated kyphotic curvature of  the included spine.      Impression    IMPRESSION:   1.  Airspace disease in the right upper lobe, right lower lobe, and  left lower lobe. There may be cavitary lesions in the left lung base.  Recommend follow-up imaging to resolution following treatment, perhaps  in 4-6 weeks.  2.  Cardiomegaly with pleural effusions and fluid in the pericardial  recesses.    LESTER J FAHRNER, MD   Echocardiogram Complete    Narrative    [id number]  [id number]  [id number]  [id number]^GREG^WANDA^PAT           [facility]  Echocardiography Laboratory  [address] [place] [address]        Name: CUONG GARCIA  MRN: [id number]  : 1935  Study Date: 2020 02:33 PM  Age: 84 yrs  Gender: Female  Patient Location: [facility]  Reason For Study: CHF  Ordering Physician: WANDA GARZA  Referring Physician: Pb Andrews  Performed By: Unique Shanks     BSA: 1.6 m2  Height: 70 in  Weight: 114 lb  HR: 115  BP: 117/74 mmHg  _____________________________________________________________________________  __        Procedure  Complete Portable Echo Adult. Optison (NDC #[id number]) given intravenously.  _____________________________________________________________________________  __        Interpretation Summary     1. The left ventricle is normal in size. The visual ejection fraction is  estimated at 25%. There is severe global hypokinesia of the left ventricle.  2. The right ventricle is  moderately dilated. The right ventricular systolic  function is mild to moderately reduced.  3. There is moderately severe (3+) mitral regurgitation. 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  4. There is moderately severe (3+) tricuspid regurgitation. The right  ventricular systolic pressure is approximated at 30mmHg plus the right atrial  pressure.     Echo from 1/2019 showed EF 60%, normal RV, 1-2+ MR, 1+ TR.  _____________________________________________________________________________  __        Left Ventricle  The left ventricle is normal in size. There is normal left ventricular wall  thickness. The visual ejection fraction is estimated at 25%. Diastolic  function not assessed due to atrial fibrillation. There is severe global  hypokinesia of the left ventricle.     Right Ventricle  The right ventricle is moderately dilated. The right ventricular systolic  function is mild to moderately reduced.     Atria  There is moderate biatrial enlargement. There is no atrial shunt seen.     Mitral Valve  There is moderately severe (3+) mitral regurgitation. 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        Tricuspid Valve  There is moderately severe (3+) tricuspid regurgitation. The right ventricular  systolic pressure is approximated at 30mmHg plus the right atrial pressure.     Aortic Valve  The aortic valve is normal in structure and function.     Pulmonic Valve  The pulmonic valve is normal in structure and function.     Vessels  Normal ascending, transverse (arch), and descending aorta. The inferior vena  cava was normal in size with preserved respiratory variability.     Pericardium  There is no pericardial effusion.        Rhythm  The rhythm was atrial fibrillation.  _____________________________________________________________________________  __  MMode/2D Measurements & Calculations  IVSd: 0.98 cm     LVIDd: 3.8 cm  LVIDs: 3.4 cm  LVPWd: 0.98 cm  FS: 9.7 %  LV mass(C)d: 113.6 grams  LV mass(C)dI: 69.1 grams/m2  Ao  root diam: 3.2 cm  LA dimension: 4.3 cm  asc Aorta Diam: 3.4 cm  LA/Ao: 1.3  LA Volume (BP): 79.9 ml  LA Volume Index (BP): 48.7 ml/m2  RWT: 0.52           Doppler Measurements & Calculations  MV E max rex: 82.3 cm/sec  MV dec time: 0.15 sec  MR ERO: 0.33 cm2  MR volume: 50.0 ml  TR max rex: 273.4 cm/sec  TR max P.9 mmHg  E/E' av.3  Lateral E/e': 9.0  Medial E/e': 13.7           _____________________________________________________________________________  __           Report approved by: Gokul Koch 2020 04:28 PM

## 2020-02-25 NOTE — PROVIDER NOTIFICATION
MD Notification    Notified Person: hospitalist    Notified Person Name: Efe Chandra    Notification Date/Time: 2/25 9625    Notification Interaction: web paged    Purpose of Notification:  BG 55, need hypoglycemia protocol    Orders Received:  Hypoglycemia protocol ordered    Comments:

## 2020-02-25 NOTE — PROGRESS NOTES
02/25/20 1043   Quick Adds   Type of Visit Initial Occupational Therapy Evaluation   Living Environment   Lives With alone   Living Arrangements house   Home Accessibility stairs to enter home;stairs within home   Number of Stairs, Main Entrance 2   Number of Stairs, Within Home, Primary other (see comments)  (flight to basement for laundry room)   Transportation Anticipated family or friend will provide   Self-Care   Usual Activity Tolerance moderate   Current Activity Tolerance poor   Equipment Currently Used at Home shower chair   Functional Level   Ambulation 0-->independent   Transferring 0-->independent   Toileting 0-->independent   Bathing 1-->assistive equipment   Dressing 0-->independent   Eating 0-->independent   Communication 0-->understands/communicates without difficulty   Swallowing 0-->swallows foods/liquids without difficulty   Cognition 0 - no cognition issues reported   Fall history within last six months no   General Information   Onset of Illness/Injury or Date of Surgery - Date 02/23/20   Referring Physician Dr. Dayanara Rodriguez   Patient/Family Goals Statement Pt plans to discharge to TCU   Additional Occupational Profile Info/Pertinent History of Current Problem Admitted for CHF and A-fib with RVR.    Precautions/Limitations fall precautions;oxygen therapy device and L/min  (On 3L at eval. No supplemental O2 at baseline. )   General Observations Daughter present for part of OT eval.    Cognitive Status Examination   Orientation orientation to person, place and time   Level of Consciousness alert;confused   Follows Commands (Cognition) WNL   Memory   (will continue to monitor and assess as needed)   Visual Perception   Visual Perception Wears glasses   Pain Assessment   Patient Currently in Pain No   Mobility   Bed Mobility Bed mobility skill: Rolling/Turning;Bed mobility skill: Scooting/Bridging;Bed mobility skill: Sit to supine;Bed mobility skill: Supine to sit;Bed mobility analysis   Bed  Mobility Skill: Rolling/Turning   Level of [place] - Bed Mobility Skill Rolling Turning minimum assist (75% patients effort)   Bed Mobility Skill: Scooting/Bridging   Level of [place]: Scooting/Bridging minimum assist (75% patients effort)   Bed Mobility Skill: Sit to Supine   Level of [place]: Sit/Supine minimum assist (75% patients effort)   Bed Mobility Skill: Supine to Sit   Level of [place]: Supine/Sit minimum assist (75% patients effort)   Bed Mobility Analysis   Bed Mobility Limitations cognitive deficits   Impairments Contributing to Impaired Bed Mobility impaired balance;pain;decreased strength   Transfer Skill: Bed to Chair/Chair to Bed   Level of [place]: Bed to Chair moderate assist (50% patients effort)   Transfer Skill: Sit to Stand   Level of [place]: Sit/Stand minimum assist (75% patients effort)   Transfer Skill: Toilet Transfer   Level of [place]: Toilet moderate assist (50% patients effort)   Assistive Device seat riser;grab bars   Toilet Transfer Skill Comments BSC transfer only   Upper Body Dressing   Level of [place]: Dress Upper Body minimum assist (75% patients effort)   Lower Body Dressing   Level of [place]: Dress Lower Body moderate assist (50% patients effort)   Toileting   Level of [place]: Toilet moderate assist (50% patients effort)   Grooming   Level of [place]: Grooming minimum assist (75% patients effort)   Instrumental Activities of Daily Living (IADL)   Previous Responsibilities meal prep;housekeeping;laundry;medication management;finances   Activities of Daily Living Analysis   Impairments Contributing to Impaired Activities of Daily Living pain;balance impaired;cognition impaired;strength decreased   General Therapy Interventions   Planned Therapy Interventions ADL retraining;cognition;strengthening;transfer training   Clinical Impression   Criteria for Skilled Therapeutic Interventions Met yes, treatment indicated   OT  "Diagnosis Decreased I and safety with ADLs   Influenced by the following impairments Pain; IMpaired cognition and safety; Decreased balance and activity tolerance   Assessment of Occupational Performance 3-5 Performance Deficits   Identified Performance Deficits Dressing; Toileting; Bathing; IADLs   Clinical Decision Making (Complexity) Moderate complexity   Therapy Frequency 5x/week   Predicted Duration of Therapy Intervention (days/wks) 5 days   Anticipated Discharge Disposition Transitional Care Facility   Risks and Benefits of Treatment have been explained. Yes   Patient, Family & other staff in agreement with plan of care Yes   [facility] TM \"6 Clicks\"   2016, Trustees of [facility], under license to [employer].  All rights reserved.   6 Clicks Short Forms Daily Activity Inpatient Short Form   [facility]  \"6 Clicks\" Daily Activity Inpatient Short Form   1. Putting on and taking off regular lower body clothing? 2 - A Lot   2. Bathing (including washing, rinsing, drying)? 2 - A Lot   3. Toileting, which includes using toilet, bedpan or urinal? 2 - A Lot   4. Putting on and taking off regular upper body clothing? 3 - A Little   5. Taking care of personal grooming such as brushing teeth? 3 - A Little   6. Eating meals? 4 - None   Daily Activity Raw Score (Score out of 24.Lower scores equate to lower levels of function) 16   Total Evaluation Time   Total Evaluation Time (Minutes) 10     "

## 2020-02-25 NOTE — PLAN OF CARE
OT: Eval complete and Tx initiated. Pt admitted for CHF and A-fib with RVR. Prior to admit, pt lives in house alone and reports I with ADL/IADLs.     Discharge Planner OT   Patient plan for discharge: Rehab    Current status: Pt required min-mod A for functional transfers and LE dressing mgmt. O2 sats remain stable in low 90s on 2L during activity.     Barriers to return to prior living situation: Fall risk; Current level of A required; Lives alone; Stairs to enter home; Bathtub    Recommendations for discharge: TCU    Rationale for recommendations: Pt limited by pain, impaired cognition and safety, and decreased balance and activity tolerance; thus, OT will continue 5x/week to progress I and safety with ADLs. Pt appears in agreement with TCU stay. Pt is not safe to return to independent living at this time. If discharges home, recommend strict 24 hr supervision from family with A for ADLs and transfers, use of FWW at all times, and home RN/PT/OT/HHA.        Entered by: Pedrito Duff 02/25/2020 11:19 AM

## 2020-02-26 NOTE — PLAN OF CARE
Heart Failure Care Pathway  GOALS TO BE MET BEFORE DISCHARGE:    1. Decrease congestion and/or edema with diuretic therapy to achieve near      optimal volume status.            Dyspnea improved:  Yes            Edema improved:     Yes        Net I/O and Weights since admission:          01/27 0700 - 02/26 0659  In: 1190 [P.O.:830; I.V.:360]  Out: 2900 [Urine:2900]  Net: -1710            Vitals:    02/23/20 1946 02/24/20 0044 02/25/20 0408 02/26/20 0547   Weight: 51.7 kg (114 lb) 51.7 kg (114 lb) 51.6 kg (113 lb 12.8 oz) 49 kg (108 lb 1.6 oz)       2.  O2 sats > 92% on RA or at prior home O2 therapy level.          Current oxygenation status:       SpO2: 92 %         O2 Device: Nasal cannula,  Oxygen Delivery: 3 LPM         Able to wean O2 this shift to keep sats > 92%:  No, please explain: Continues to require 3L NC       Does patient use Home O2? No    3.  Tolerates ambulation and mobility near baseline: No, please explain: Activity encouraged        How many times did the patient ambulate with nursing staff this shift? 0    Please review the Heart Failure Care Pathway for additional HF goal outcomes.    Essie Lyon RN RN  2/26/2020     Patient is alert and oriented, assist of one with gait belt. Denies chest pain and shortness of breath. Continues on IV lasix with good output. Plan for continued diuresis.

## 2020-02-26 NOTE — PROGRESS NOTES
Brief Palliative Care Team Note    Consult received, chart briefly reviewed and discussed with Efe Chandra PA-C. I will be available to meet with family tomorrow (Thursday (2/27). Please do not hesitate to contact me if more urgent needs arise. Thank you.    Edda DEL VALLE, [facility]  Palliative Medicine   [phone] (team pager)

## 2020-02-26 NOTE — CONSULTS
[facility]    Received [facility] Consult from Dr. Rodriguez on admission.     Reviewed Ms. Damon's chart and see multiple notes documenting that her wishes are for conservative management and that she and her family are discussing her goals of care and potential for palliative consult vs hospice vs ongoing conservative medical management, or a combination.     With the above in mind, do not feel that [facility] follow-up (frequent visits/frequent interventions) is in line with Ms. Damon's current wishes. Should her wishes change, or should she or her family express interest in following up with our team, please re-consult CORE.      Please call with questions.     Komal Gomez RN, BSN  [facility] RN Care Coordinator  [facility]  [phone]  [facility]: Cardiomyopathy, Optimization, Rehabilitation, Education

## 2020-02-26 NOTE — PLAN OF CARE
OT: attempted to work with patient however she declined despite encouragement stating that she had been up for a gown change and nursing cares and wanted to eat her meal in bed. OT helped set-up her food, opened containers and positioned tray and patient in functional position, call light available.

## 2020-02-26 NOTE — PLAN OF CARE
Heart Failure Care Pathway  GOALS TO BE MET BEFORE DISCHARGE:    1. Decrease congestion and/or edema with diuretic therapy to achieve near      optimal volume status.            Dyspnea improved:  Yes            Edema improved:     Yes        Net I/O and Weights since admission:          01/27 2300 - 02/26 2259  In: 1193 [P.O.:830; I.V.:363]  Out: 3350 [Urine:3350]  Net: -2157            Vitals:    02/23/20 1946 02/24/20 0044 02/25/20 0408 02/26/20 0547   Weight: 51.7 kg (114 lb) 51.7 kg (114 lb) 51.6 kg (113 lb 12.8 oz) 49 kg (108 lb 1.6 oz)       2.  O2 sats > 92% on RA or at prior home O2 therapy level.          Current oxygenation status:       SpO2: 93 %         O2 Device: Nasal cannula,  Oxygen Delivery: 3 LPM         Able to wean O2 this shift to keep sats > 92%:  No, please explain: Still requiring O2       Does patient use Home O2? No    3.  Tolerates ambulation and mobility near baseline: No, please explain: Very deconditioned, A fib RVR        How many times did the patient ambulate with nursing staff this shift? 0    Pt A&O. Denies pain. Tele is A fib CVR/RVR. BPs stable. LE edema improved. Very poor PO intake despite encouragement. Declines to work with therapies. Continues to diurese copious amounts of urine. K+ replaced per protocol. No discharge plans at this time.    Please review the Heart Failure Care Pathway for additional HF goal outcomes.    Calista Trevino RN RN  2/26/2020

## 2020-02-26 NOTE — PLAN OF CARE
VSS except HR is running . Needs turn/repo for skin health. Coccyx and spine are blanchable, red. Heels on mattress, pt declines pillows under legs, boots ordered to try. Planning care conference tomorrow, details unknown. Pt is thinking about moving towards comfort cares. K replacing via IV. Incontinent of urine-using external catheter.

## 2020-02-26 NOTE — PROGRESS NOTES
[facility]    Hospitalist Progress Note    Date of Service (when I saw the patient): 02/26/2020    Assessment & Plan   Lisa Damon is a 84 year old female with a history of Raynaud's syndrome, TIA presents with increased shortness of breath and found to have an acute CHF exacerbation and Afib with RVR.  Admitted to CICU.     Atrial fibrillation with RVR   New onset acute systolic CHF exacerbation  Patient presents with acute onset of shortness of breath and bilateral lower extremity edema, further work-up indicated possible community-acquired pneumonia along with elevated BNP.  Her last echocardiogram was in 2019 and it did show ejection fraction at 60% age with a mild mitral regurgitation.  Further evaluation at that time with a Holter did indicate an nonsustained V. tach, she was recommended to have ischemic work-up at that time.  * Chest Xray shows extensive bilateral pulmonary disease, possibly edema, and effusions, R > L. CT chest confirms airways disease, possibly with some bronchiectasis. Weight is still up. Not much edema remaining. HR , variable. BP marginal in 90's/50's. No specific complaints. Heart irregular, lungs with bilateral crackles.  * Patient was diagnosed with A. fib with RVR on presentation at the rate of 150 bpm, she was treated with IV metoprolol and her HR improved.  Doppler ultrasound of bilateral lower extremity was obtained due to increased to swelling which is acute and it is negative for DVT.  TSH wnl.  --Echocardiogram with new dx biventricular failure with EF 25%, likely tachycardia induced with new dx afib with rvr.  Also 3+ MR and TR.  --Cardiology consulted, input greatly appreciated.  --Continuous cardiac telemetry  --Digoxin added  --Transition to metoprolol XL 12.5 mg TID  --Started PO Lasix 40 mg daily  --CHADS VASC is 4 indicating benefit of anticoatulation, started on Eliquis 2.5 mg bid (based on age and wt)  --Replace electrolytes, monitor renal  function  --Daily weights, intake/output  --Continued goals of care discussion to occur tomorrow. Palliative care consulted.    Community acquired pneumonia  Acute hypoxic respiratory failure  --Chest x-ray did indicate bilateral fibrosis which seems to be a new diagnosis  --CT chest:  1.  Airspace disease in the right upper lobe, right lower lobe, and  left lower lobe. There may be cavitary lesions in the left lung base.  Recommend follow-up imaging to resolution following treatment, perhaps  in 4-6 weeks.  2.  Cardiomegaly with pleural effusions and fluid in the pericardial  recesses.  --Telemetry  --Continue IV Rocephin and Zithromax  --Sputum culture to be obtained. Blood cultures NGTD.  --Wean oxygen down as possible  --Continue albuterol and DuoNeb nebulizations and expectorants.    Failure to thrive  Severe protein calorie malnutrition  End of Life Discussion  Pt reports she does not want aggressive cares.  She has lost a lot of her independence over the last few years and reports an unintentional ~60 lbs weight loss.  Discussed options ranging from aggressive care with procedures, to medical management to comfort care.  Pt expressed to me and Cardiology that she doesn't want to do a lot, she is ok with comfort care and has been healthy most of her life until now.   --Palliative care and/or hospice offered to family.  Palliative care meeting stephan 2 pm.    Hypothyroidism  --Continue PTA Synthroid    Peripheral neuropathy  Raynaud's syndrome  Scoliosis  Patient does have significant drain also on bilateral lower extremities and upper extremities, knee down her legs are very cold and there is possible cyanosis on lower extremities with erythema.  Chronic issue as per daughter.  No history of autoimmune illness other than raynauds, daughter has history of multiple sclerosis and Raynaud's.  A sister has idiopathic peripheral neuropathy.    History of TIA and NSVT  --- Continue statin and aspirin     DVT  Prophylaxis: Heparin drip  Code Status: DNR/DNI     Disposition: Expected discharge in 1-2 days, pending cardiology recommendations, possible palliative care consult, and safe dispo.  Placement unclear at this time.    D/w daughter at bedside 2/25    Efe Pb Goodman Charli SALGUERO    Interval History   Pt appears weak on my arrival, fatigued. Low appetite.  Denies SOB currently, but quickly get sob with any activity.  Generally weak and very tired.  Does not want pacemaker or aggressive therapies.  Denies f/c, cp, abd pain, n/v.     -Data reviewed today: I reviewed all new labs and imaging results over the last 24 hours.     Physical Exam   Temp: 97.5  F (36.4  C) Temp src: Oral BP: 101/67 Pulse: 105 Heart Rate: 121 Resp: 16 SpO2: 92 % O2 Device: Nasal cannula Oxygen Delivery: 3 LPM  Vitals:    02/24/20 0044 02/25/20 0408 02/26/20 0547   Weight: 51.7 kg (114 lb) 51.6 kg (113 lb 12.8 oz) 49 kg (108 lb 1.6 oz)     Vital Signs with Ranges  Temp:  [97.2  F (36.2  C)-97.9  F (36.6  C)] 97.5  F (36.4  C)  Pulse:  [] 105  Heart Rate:  [] 121  Resp:  [11-37] 16  BP: ()/(47-89) 101/67  SpO2:  [89 %-97 %] 92 %  I/O last 3 completed shifts:  In: 490 [P.O.:480; I.V.:10]  Out: 1600 [Urine:1600]    Constitutional: Somnolent, oriented to self, place, situation. Cooperative, no apparent distress.  HEENT: Moist mucous membranes, normal dentition.  Respiratory: Bilateral basilar crackles, scattered wheezes noted.  Breathing non-labored.  Cardiovascular: Mildly tachycardic and irregular, and no murmur noted.  Skin: No rashes, no cyanosis, no edema.  Bilateral lower extremity 2+ pitting edema noted, toes are cyanotic looking.  Lower extremities are cold from knee downwards.  Chronic per patient.  Musculoskeletal: No joint swelling, erythema or tenderness.    Medications     - MEDICATION INSTRUCTIONS -       Reason anticoagulant not prescribed for atrial fibrillation       - MEDICATION INSTRUCTIONS -       ACE/ARB/ARNI  NOT PRESCRIBED         apixaban ANTICOAGULANT  2.5 mg Oral BID     azithromycin  250 mg Intravenous Q24H     cefTRIAXone  2 g Intravenous Q24H     digoxin  125 mcg Oral Daily     famotidine  20 mg Oral Daily     furosemide  40 mg Intravenous Q12H     ipratropium - albuterol 0.5 mg/2.5 mg/3 mL  3 mL Nebulization 4x Daily     levothyroxine  88 mcg Oral Daily     metoprolol succinate ER  12.5 mg Oral TID     multivitamin w/minerals  1 tablet Oral Daily     senna-docusate  1 tablet Oral BID    Or     senna-docusate  2 tablet Oral BID     sodium chloride (PF)  3 mL Intracatheter Q8H     vitamin B1  100 mg Oral Daily       Data   Recent Labs   Lab 02/26/20  0547 02/25/20  0526 02/24/20  1024 02/24/20  0451 02/24/20  0114 02/23/20 2037   WBC 14.0* 12.5*  --   --  13.6* 13.0*   HGB 12.5 11.7  --   --  12.3 12.9   MCV 93 93  --   --  92 92    307  --   --  335 347    141  --   --   --  135   POTASSIUM 2.9* 3.5  --   --   --  4.7   CHLORIDE 95 104  --   --   --  100   CO2 37* 30  --   --   --  30   BUN 29 43*  --   --   --  31*   CR 0.86 1.13*  --   --   --  0.81   ANIONGAP 4 7  --   --   --  5   KIMBERLY 9.1 9.2  --   --   --  10.1   GLC 91 66*  --   --   --  109*   ALBUMIN  --   --   --   --   --  3.4   PROTTOTAL  --   --   --   --   --  7.5   BILITOTAL  --   --   --   --   --  0.7   ALKPHOS  --   --   --   --   --  103   ALT  --   --   --   --   --  47   AST  --   --   --   --   --  35   TROPI  --   --  0.040 0.016 <0.015 <0.015       No results found for this or any previous visit (from the past 24 hour(s)).

## 2020-02-26 NOTE — PROGRESS NOTES
[facility]  Cardiology Progress Note  Date of Service: 02/26/2020  Primary Cardiologist: will be Dr. Milton    Assessment & Plan    Lisa Damon is a 84 year old female with past medical history significant for raynauds syndrome, TIA admitted on 2/23/2020 with increased sob.      Assessment:  1. Acute systolic heart failure and new dx biventricular failure with EF 25% likely tachycardia induced with new dx afib with rvr.    - bilateral pleural effusions   - admit wt 114, current wt 108, dry wt likely 105   - bp has been labile    2.  New dx afib with rvr   - rates elevated in the 90s-110s at rest   - CHADS VASC 4 inidicating benefit of anticoatulation, started on Eliquis 2.5 mg bid (based on age and wt)    3.   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  End of Life Discussion- discussed options ranging from aggressive care with procedures, to medical management to comfort care.  Pt tells me she doesn't want to do a lot, she is ok with comfort care and has been healthy most of her life until now.  Ok to dying naturally.  2/26 update, pt reiterated wishes of conservative care.  Does not want pacemaker or other procedures, does not want therapy.  At this time is planned to discharge to tcu.     -recommend family meeting/ care conference, based on chart notes pts wishes are not being followed by family?    5.  Malnourished/ cachetic- appreciate dietary assistance      Plan:   1. Increase toprol xl to 25 mg tid  2. Stop IV lasix, start po lasix at 40 mg daily  3. Replace K  4. Unable to add acei at this time due to bp  5. Recommend care conference or palliative consult to address goals of care.  If HRs are unable to be controlled with meds and the plan is restorative care pt may need a pacemaker placed.  Of course if she wants conservative care would allow for mild tachycardia and possible discharge to hospice.   6. Dispo depends on these  decisions.    7. Npo after midnight in case of ppm.        Lydia Nieto PA-C  [facility]  Pager: [phone]     Interval History   Still tired, sob.  States she doesn't want to do therapy or take meds.  She is very clear that she does not want a pacemaker.      Physical Exam   Temp: 97.5  F (36.4  C) Temp src: Oral BP: 101/67 Pulse: 105 Heart Rate: 121 Resp: 16 SpO2: 92 % O2 Device: Nasal cannula Oxygen Delivery: 3 LPM  Vitals:    02/24/20 0044 02/25/20 0408 02/26/20 0547   Weight: 51.7 kg (114 lb) 51.6 kg (113 lb 12.8 oz) 49 kg (108 lb 1.6 oz)   Elderly, frail, cachectic woman in no acute distress.  Oxygen in place per nasal cannula.  Her heart is irregularly irregular and tachycardic with a 2 out of 6 systolic murmur heard best left sternal border.  Her lungs have bilateral rales in the lower lobes clearer in the upper lobes.  She has 1+ pitting edema to her knees    Medications     - MEDICATION INSTRUCTIONS -       Reason anticoagulant not prescribed for atrial fibrillation       - MEDICATION INSTRUCTIONS -       ACE/ARB/ARNI NOT PRESCRIBED         apixaban ANTICOAGULANT  2.5 mg Oral BID     azithromycin  250 mg Intravenous Q24H     cefTRIAXone  2 g Intravenous Q24H     digoxin  125 mcg Oral Daily     famotidine  20 mg Oral Daily     furosemide  40 mg Intravenous Q12H     ipratropium - albuterol 0.5 mg/2.5 mg/3 mL  3 mL Nebulization 4x Daily     levothyroxine  88 mcg Oral Daily     metoprolol succinate ER  25 mg Oral TID     multivitamin w/minerals  1 tablet Oral Daily     senna-docusate  1 tablet Oral BID    Or     senna-docusate  2 tablet Oral BID     sodium chloride (PF)  3 mL Intracatheter Q8H     vitamin B1  100 mg Oral Daily       Data   Most Recent 3 CBC's:  Recent Labs   Lab Test 02/26/20  0547 02/25/20  0526 02/24/20  0114   WBC 14.0* 12.5* 13.6*   HGB 12.5 11.7 12.3   MCV 93 93 92    307 335     Most Recent 3 BMP's:  Recent Labs   Lab Test 02/26/20  0547 02/25/20  0526 02/23/20 2037    141  135   POTASSIUM 2.9* 3.5 4.7   CHLORIDE 95 104 100   CO2 37* 30 30   BUN 29 43* 31*   CR 0.86 1.13* 0.81   ANIONGAP 4 7 5   KIMBERLY 9.1 9.2 10.1   GLC 91 66* 109*     Most Recent 3 Troponin's:  Recent Labs   Lab Test 02/24/20  1024 02/24/20  0451 02/24/20  0114   TROPI 0.040 0.016 <0.015     Last 24 hours labs reviewed   EKG: (reviewed personally) afib     Imaging: CT chest  1.  Airspace disease in the right upper lobe, right lower lobe, and  left lower lobe. There may be cavitary lesions in the left lung base.  Recommend follow-up imaging to resolution following treatment, perhaps  in 4-6 weeks.  2.  Cardiomegaly with pleural effusions and fluid in the pericardial  Recesses.    Tele: afib with -110s    Echo:   1. The left ventricle is normal in size. The visual ejection fraction is  estimated at 25%. There is severe global hypokinesia of the left ventricle.  2. The right ventricle is moderately dilated. The right ventricular systolic  function is mild to moderately reduced.  3. There is moderately severe (3+) mitral regurgitation. 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  4. There is moderately severe (3+) tricuspid regurgitation. The right  ventricular systolic pressure is approximated at 30mmHg plus the right atrial  pressure.     Echo from 1/2019 showed EF 60%, normal RV, 1-2+ MR, 1+ TR.    Last ischemic eval: none    Device: none

## 2020-02-26 NOTE — PROGRESS NOTES
SW:   I: Char prior authorization for [employer] insurance sent.   P: Will continue to follow.     12:00: Evsanketre prior authorization is good from 2/26/20 to 3/6/20. #[id number]    SADE Beckham, [facility]  [phone]

## 2020-02-27 NOTE — PROGRESS NOTES
SW  I: Patient has decided on hospice. Family and patient have chosen [facility]; Writer called and spoke with Pb of [facility] regarding our patients hospice consult. Referrals sent to [facility] and [facility]. Pt likely to transfer to the 8th floor at [facility]. Daughter Ashley will call writer in the a.m. regarding a time when family can all be present for the hospice consult. Per Tonia Ambriz or Greta of [facility] will contact patients daughterAshley ([phone]) in the morning and SW regarding consult time.   P: Will continue to follow.     SADE Beckham, [facility]   [facility]  [phone]

## 2020-02-27 NOTE — PROGRESS NOTES
Brief Cardiology Note  Pt: Lisa Damon    1935      Lisa Damon is a 84 year old well known to our service.  Greatly appreciate palliative assistance. Notes reviewed, pt requests comfort care and no meds. Cardiology will sign off.  Please call with questions.        Lydia Nieto PA-C  3:58 PM 2020   [facility]  Pager: [phone]

## 2020-02-27 NOTE — CONSULTS
[facility]  Palliative Care Consultation Note    This note with report created with voice recognition software, and while I edit it, it may contain transcription errors.    Patient: Lisa Damon  Date of Admission:  2/23/2020    Requesting Clinician / Team: Hospital medicine  Reason for consult: Goals of care    Recommendations:    Transition to comfort care: As described below I would stop her heart medication, potassium, and nearly all scheduled medications.  I would use the routine comfort care order set opioid medicines for dyspnea and pain PRN.    Plan for hospice care in a care facility    Discussion: I met with the patient, 2 daughters in person, 1 daughter on the phone, and the hospital medicine team today and we discussed her situation, care goals, and the overall plan of care.  I was also able to discuss her care with the cardiology team earlier today.  The patient continues to be very consistent that she does not want life prolonging treatments, really of any sort, but especially invasive medical treatments.  She indicates that she feels like her life is at an end and her heart is telling her that she will die soon.  Her family is very sad about this but overall understands and supports the patient's decision and want to honor her wishes.  We discussed the plan of care moving forward and overall recommended hospice care.  We discussed hospice care at home versus a facility and the patient very much wants to be at a facility.  We discussed what the agent could realistically expect out of the facility based hospice care, as well as that insurance does not typically pay for room and board.  Discussed symptoms that she could expect, particularly related to dyspnea.  Discussed with her that chances are her symptoms will be modestly better if she continues her heart medications, particularly rate control, although the patient very clearly and repeatedly articulates today that  she does not feel at all better with the interventions it is been enacted here in the hospital to control her rate.  Regardless the patient is very clear that she wants us to stop her heart medications, and does  not want us to even offer them to her.  Discussed with them symptom management and end-of-life including dyspnea control with opiates which may be sedating.  Answered their questions.  Hospital chaplain resident was present and offering support to the patient and family.      I discussed with the patient and family that prognosis is somewhat uncertain.  I do very much believe the patient's prognosis is less than 6 months and that she very much qualifies for hospice care.  The patient herself think she is going to die in a matter of days and I acknowledge that patients are often right about that.  However I did caution the patient and family that I think it could easily be several weeks and even more than a month or longer, we really just do not know.    These recommendations have been discussed with primary team and cardiology.  I am just filling in today, please call our general pager tomorrow to contact the team..      Thank you for the opportunity to participate in the care of this patient and family. Our team: will continue to follow.     After regular work hours and on weekends/holidays, you can call our answering service at [phone]. Also, who's on call for us is available in [employer].     80' on floor today over half counseling & coord as described above  Elian Marroquin MD / Palliative Medicine / Mobile [phone] - texts, without PHI, preferred /  Palliative After-Hours Answering Service [phone].           Assessments:  Lisa Sheridancliff is a 84 year old female with a severe systolic cardiomyopathy, atrial fibrillation with RVR, severe mitral and tricuspid regurgitation, who is here with shortness of breath and edema from the above.  Chronic decline of over 50 pounds the last  year.  Severe lower extremity neuropathy of unclear etiology.  History of TIA.  Also being treated for community acquired pneumonia and has fibrotic changes on her chest CT.    Today, the patient was seen for:  Systolic cardiomyopathy  Atrial fibrillation with rapid ventricular response    Prognosis, Goals, & Planning:      Functional Status just prior to hospitalization: 2 (Ambulatory and capable of all selfcare but unable to carry out any work activities; may need help with IADLs up and about > 50% of waking hours)      Prognosis, Goals, and/or Advance Care Planning were addressed today: Yes        Summary/Comments:       Patient's decision making preferences: independently          Patient has decision-making capacity today for complex decisions: Yes            I have concerns about the patient/family's health literacy today: No           Patient has a completed Health Care Directive: No.       Code status: DNR/DNI    Coping, Meaning, & Spirituality:   Mood, coping, and/or meaning in the context of serious illness were addressed today: Yes  Summary/Comments: Patient reports she is at peace with this.  She is not very Scientologist.  Family very sad and grieving the situation but report support for the patient's decisions.    Social:     Living situation: She has been living independently.    Key family / caregivers: Several adult daughters     Extensive social history summarized in social work note from February 25 is reviewed.    History of Present Illness:  History gathered today from: patient, family/loved ones, medical chart, medical team members    Admitted with shortness of breath and edema and was found to be in atrial fibrillation with a rate of 150 bpm, and to have a worsening systolic cardiomyopathy and bivalvular regurgitation..  She is met with cardiology and expressed an interest in mostly comfort focused treatments.  AV node ablation and pacemaker were discussed for rate control of her atrial  fibrillation which she declined.  She did seem agreeable with medical therapy for her atrial fibrillation. [facility] saw her and did not think she was appropriate for their program due to her care goals.    She reports she does not feel better being in the hospital, and in fact feels worse.  Long discussion about prognosis, goals of care, etc. as described above.    Key Palliative Symptom Data:  # Pain severity the last 12 hours: low  # Dyspnea severity the last 12 hours: moderate  # Nausea severity the last 12 hours: low  # Anxiety severity the last 12 hours: low        ROS:  Comprehensive ROS is reviewed and is negative except as here & per HPI:     Past Medical History:  Past Medical History:   Diagnosis Date     Arthritis      Cholesteatoma      Hypothyroidism 3/25/2019     Peripheral neuropathy      Peripheral neuropathy 3/25/2019     Raynaud's syndrome      Scoliosis 3/25/2019     Scoliosis (and kyphoscoliosis), idiopathic      Unspecified hypothyroidism         Past Surgical History:  Past Surgical History:   Procedure Laterality Date     BACK SURGERY      L4-5 FUSION     COLONOSCOPY N/A 10/6/2016    Procedure: COLONOSCOPY;  Surgeon: Cal Timmons MD;  Location:  GI     EXAM UNDER ANESTHESIA EAR(S) Right 8/6/2015    Procedure: EXAM UNDER ANESTHESIA EAR(S);  Surgeon: Wan Suh MD;  Location: [facility]     GYN SURGERY      HYSTERECTOMY     L4-5      DISKECTOMY     ORTHOPEDIC SURGERY      BILATERAL SHOULDER ARTHOPLASTY     TONSILLECTOMY       TYMPANOPLASTY Right 8/6/2015    Procedure: TYMPANOPLASTY;  Surgeon: Wan Suh MD;  Location: [facility]     TYMPANOPLASTY Right 11/16/2017    Procedure: TYMPANOPLASTY;  RIGHT TYMPANOPLASTY, REVISION PORP;  Surgeon: Wan Suh MD;  Location: [facility]         Family History:  No family history on file.  Daughters alive and well     Allergies:  No Known Allergies     Medications:  I have reviewed this patient's medication profile and medications from  this hospitalization.   Noted scheduled meds are:  Metoprolol, Lasix,     Noted PRN meds are:      Physical Exam:  Vital Signs: Temp: 98.3  F (36.8  C) Temp src: Oral BP: 111/76 Pulse: 109 Heart Rate: 129 Resp: 16 SpO2: 92 % O2 Device: Nasal cannula Oxygen Delivery: 2 LPM  Weight: 106 lbs 12.8 oz  Alert cachectic frail older woman lying in bed with her eyes closed most of the time.  Heart rate is 130-140 most of the time in the room.  She is on telemetry.  Affect is full, pleasant, interactive, voice is soft but speech is fluent, memory, thought processes, and fund of knowledge are all normal.    Data reviewed:  Recent imaging reviewed, my comments on pertinents:   Echocardiogram showed an EF of 25% with severe global hypokinesia of the left ventricle.  Moderately severe mitral regurgitation and tricuspid regurgitation.  Previous echo showed EF of 60%    CT of the chest:IMPRESSION:   1.  Airspace disease in the right upper lobe, right lower lobe, and  left lower lobe. There may be cavitary lesions in the left lung base.  Recommend follow-up imaging to resolution following treatment, perhaps  in 4-6 weeks.  2.  Cardiomegaly with pleural effusions and fluid in the pericardial  recesses.    Recent lab data reviewed, my comments on pertinents:   Creatinine 0.74, sodium 138, albumin 2.2, AST and ALT up, lactate 2.2,

## 2020-02-27 NOTE — PROGRESS NOTES
Sepsis Evaluation Progress Note    I was called to see Lisa MATTHEW Marisol due to abnormal vital signs triggering the Sepsis SIRS screening alert. She is known to have an infection.     Physical Exam   Vital Signs:  Temp: 98.6  F (37  C) Temp src: Oral BP: 105/78 Pulse: 101 Heart Rate: 108 Resp: 20 SpO2: 93 % O2 Device: Nasal cannula Oxygen Delivery: 2 LPM    Lab:  Lactic Acid   Date Value Ref Range Status   02/24/2020 3.3 (H) 0.7 - 2.0 mmol/L Final     Lactate for Sepsis Protocol   Date Value Ref Range Status   02/26/2020 2.2 (H) 0.7 - 2.0 mmol/L Final     Comment:     Significant value called to and read back by  AMERICA LEDEZMA IN CCU AT 2329 BY AF         The patient is at baseline mental status.     The rest of their physical exam is significant for tachycardia    Assessment & Plan   NO EVIDENCE OF SEPSIS at this time.  Vital sign, physical exam, and lab findings are likely due to atrial fibrillation.    Disposition: The patient will remain on the current unit. We will continue to monitor this patient closely..  Austin Landry,     Sepsis Criteria   Sepsis: 2+ SIRS criteria due to infection  Severe Sepsis: Sepsis AND 1+ new sign of acute organ dysfunction (Note: lactate >2 is organ dysfunction)  Septic Shock: Sepsis AND hypotension despite volume resuscitation with 30 ml/kg crystalloid

## 2020-02-27 NOTE — PLAN OF CARE
OT: noted MD note regarding pt's wishes to transition to comfort cares only and to discontinue therapies. Therefore, OT discharged, will sign off and complete order.     Occupational Therapy Discharge Summary    Reason for therapy discharge:    Change in medical status. Pt transitioning to comfort care. Therapies to be discontinued.     Progress towards therapy goal(s). See goals on Care Plan in [facility] electronic health record for goal details.   GOALS not met d/t pt's change in medical status.       Therapy recommendation(s):    No further therapy is recommended.

## 2020-02-27 NOTE — PROGRESS NOTES
[facility]    Hospitalist Progress Note    Date of Service (when I saw the patient): 02/27/2020    Assessment & Plan   Lisa Damon is a 84 year old female with a history of Raynaud's syndrome, TIA presents with increased shortness of breath and found to have an acute CHF exacerbation and Afib with RVR.  Admitted to CICU.  Transition 2/27 to comfort care after discussion with pt and family.       Atrial fibrillation with RVR   New onset acute systolic CHF exacerbation (EF 25%)  Community acquired pneumonia  Acute hypoxic respiratory failure  Failure to thrive  Severe protein calorie malnutrition  End of Life Discussion  Patient presents with acute onset of shortness of breath and bilateral lower extremity edema, further work-up indicated possible community-acquired pneumonia along with elevated BNP.  Her last echocardiogram was in 2019 and it did show ejection fraction at 60% age with a mild mitral regurgitation.  Further evaluation at that time with a Holter did indicate an nonsustained V. tach, she was recommended to have ischemic work-up at that time.  * Chest Xray shows extensive bilateral pulmonary disease, possibly edema, and effusions, R > L. CT chest confirms airways disease, possibly with some bronchiectasis. Weight is still up. Not much edema remaining. HR , variable. BP marginal in 90's/50's. No specific complaints. Heart irregular, lungs with bilateral crackles.  * Patient was diagnosed with A. fib with RVR on presentation at the rate of 150 bpm, she was treated with IV metoprolol and her HR improved.  Doppler ultrasound of bilateral lower extremity was obtained due to increased to swelling which is acute and it is negative for DVT.  TSH wnl.  --Echocardiogram with new dx biventricular failure with EF 25%, likely tachycardia induced with new dx afib with rvr.  Also 3+ MR and TR.  --Cardiology consulted, input greatly appreciated.  -------Pt reports she does not want aggressive  cares.  She has lost a lot of her independence over the last few years and reports an unintentional ~60 lbs weight loss.  Discussed options ranging from aggressive care with procedures, to medical management to comfort care.  Pt expressed to me and Cardiology that she doesn't want to do a lot, she is ok with comfort care and has been healthy most of her life until now.   --Palliative care consulted, Care conference today.  Please see Palliative notes.  --Pt and family opted for comfort care   --Comfort orders entered.  --Discontinued cardiac medications per patient wishes.  No lab draws or invasive procedures.  Will keep levothyroxine and Pepcid going for comfort purposes, but can discontinue if patient doesn't want these.  --Hospice consulted       Code Status: DNR/DN     Disposition: Expected discharge in 1-2 days, pending cardiology recommendations, possible palliative care consult, and safe dispo.  Placement unclear at this time.    D/w family at bedside 2/27    Efe Augustin PA-C    Interval History   Pt appears weak on my arrival, fatigued. Low appetite.  Denies SOB currently, but quickly get sob with any activity.  Generally weak and very tired.  Does not want pacemaker or aggressive therapies.  Denies f/c, cp, abd pain, n/v. Goals of care discussion today completed with family present and supportive.    -Data reviewed today: I reviewed all new labs and imaging results over the last 24 hours.     Physical Exam   Temp: 98.3  F (36.8  C) Temp src: Oral BP: 108/68 Pulse: 109 Heart Rate: 107 Resp: 20 SpO2: 94 % O2 Device: Nasal cannula Oxygen Delivery: 2 LPM  Vitals:    02/25/20 0408 02/26/20 0547 02/27/20 0600   Weight: 51.6 kg (113 lb 12.8 oz) 49 kg (108 lb 1.6 oz) 48.4 kg (106 lb 12.8 oz)     Vital Signs with Ranges  Temp:  [97.2  F (36.2  C)-98.6  F (37  C)] 98.3  F (36.8  C)  Pulse:  [101-109] 109  Heart Rate:  [] 107  Resp:  [15-22] 20  BP: (101-128)/() 108/68  SpO2:  [93 %-94 %] 94  %  I/O last 3 completed shifts:  In: 303 [I.V.:303]  Out: 850 [Urine:850]    Constitutional: Somnolent, oriented to self, place, situation. Cooperative, no apparent distress.  HEENT: Moist mucous membranes  Respiratory: Bilateral basilar crackles, scattered wheezes noted.  Breathing non-labored.  Cardiovascular: Mildly tachycardic and irregular, and no murmur noted.  Skin: No rashes, no cyanosis, no edema.  Bilateral lower extremity 1-2+ pitting edema noted, toes are cyanotic looking.  Lower extremities are cold from knee downwards.  Chronic per patient.  Musculoskeletal: No joint swelling, erythema or tenderness.    Medications     - MEDICATION INSTRUCTIONS -       Reason anticoagulant not prescribed for atrial fibrillation       - MEDICATION INSTRUCTIONS -       ACE/ARB/ARNI NOT PRESCRIBED         apixaban ANTICOAGULANT  2.5 mg Oral BID     azithromycin  250 mg Intravenous Q24H     cefTRIAXone  2 g Intravenous Q24H     digoxin  125 mcg Oral Daily     famotidine  20 mg Oral Daily     furosemide  40 mg Oral Daily     ipratropium - albuterol 0.5 mg/2.5 mg/3 mL  3 mL Nebulization 4x Daily     levothyroxine  88 mcg Oral Daily     metoprolol succinate ER  25 mg Oral TID     multivitamin w/minerals  1 tablet Oral Daily     potassium chloride  20 mEq Oral Daily     senna-docusate  1 tablet Oral BID    Or     senna-docusate  2 tablet Oral BID     sodium chloride (PF)  3 mL Intracatheter Q8H     vitamin B1  100 mg Oral Daily       Data   Recent Labs   Lab 02/27/20  0549 02/26/20  2314 02/26/20  0547 02/25/20  0526 02/24/20  1024 02/24/20  0451 02/24/20  0114 02/23/20  2037   WBC 13.1*  --  14.0* 12.5*  --   --  13.6* 13.0*   HGB 12.1  --  12.5 11.7  --   --  12.3 12.9   MCV 94  --  93 93  --   --  92 92     --  333 307  --   --  335 347     --  136 141  --   --   --  135   POTASSIUM 3.6 3.8 2.9* 3.5  --   --   --  4.7   CHLORIDE 100  --  95 104  --   --   --  100   CO2 36*  --  37* 30  --   --   --  30   BUN 25   --  29 43*  --   --   --  31*   CR 0.74  --  0.86 1.13*  --   --   --  0.81   ANIONGAP 2*  --  4 7  --   --   --  5   KIMBERLY 9.1  --  9.1 9.2  --   --   --  10.1   *  --  91 66*  --   --   --  109*   ALBUMIN 2.2*  --   --   --   --   --   --  3.4   PROTTOTAL 5.7*  --   --   --   --   --   --  7.5   BILITOTAL 0.5  --   --   --   --   --   --  0.7   ALKPHOS 110  --   --   --   --   --   --  103   *  --   --   --   --   --   --  47   AST 51*  --   --   --   --   --   --  35   TROPI  --   --   --   --  0.040 0.016 <0.015 <0.015       No results found for this or any previous visit (from the past 24 hour(s)).

## 2020-02-27 NOTE — PROGRESS NOTES
Patient and family decline to be kept NPO as they do not wish to undergo any procedures. Diet re-ordered. Palliative care meeting this afternoon.

## 2020-02-27 NOTE — PROGRESS NOTES
[facility]  Cardiology Progress Note  Date of Service: 02/27/2020  Primary Cardiologist: will be Dr. Milton    Assessment & Plan    Lisa Damon is a 84 year old female with past medical history significant for raynauds syndrome, TIA admitted on 2/23/2020 with increased sob.      Assessment:  1. Acute systolic heart failure and new dx biventricular failure with EF 25% likely tachycardia induced with new dx afib with rvr.    - bilateral pleural effusions   - admit wt 114, current wt 106, dry wt likely 103-107, appears euvolemic today     2.  New dx afib with rvr   - rates elevated in the 90s-110s at rest   - CHADS VASC 4 inidicating benefit of anticoatulation, started on Eliquis 2.5 mg bid (based on age and wt)    3.   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  End of Life Discussion- discussed options ranging from aggressive care with procedures, to medical management to comfort care.  Pt tells me she doesn't want to do a lot, she is ok with comfort care and has been healthy most of her life until now.  Ok to dying naturally.  2/26 update, pt reiterated wishes of conservative care.  Does not want pacemaker or other procedures, does not want therapy.  At this time is planned to discharge to tcu.     -recommend family meeting/ care conference, based on chart notes pts wishes are not being followed by family?    5.  Malnourished/ cachetic- appreciate dietary assistance      Plan:   1. Increase toprol xl to 37.5 mg tid, continue dig  2. Could consider IV amiodarone but concern for pulmonary disease makes this risky and chances of converting to sinus rhythm are small  3. BP prevents the addition of ACEI for cardiomyopathy  4. Agree with family mtg with palliative for goals of care.  Pts wishes are very clear.  She does not want PPM, nor to participate in therapies, nor to take medications.  Our recommendation continues to be  hospice.      Lydia Nieto PA-C  [facility]  Pager: [phone]     Interval History   Tired, just wants to take a nap.  She doesn't get any sleep here.  Denies sob, isn't sure nebulizer helps.  Wishes continue to be for conservative care.      Physical Exam   Temp: 98.3  F (36.8  C) Temp src: Oral BP: 111/76 Pulse: 109 Heart Rate: 129 Resp: 16 SpO2: 92 % O2 Device: Nasal cannula Oxygen Delivery: 2 LPM  Vitals:    02/25/20 0408 02/26/20 0547 02/27/20 0600   Weight: 51.6 kg (113 lb 12.8 oz) 49 kg (108 lb 1.6 oz) 48.4 kg (106 lb 12.8 oz)   Elderly, frail, cachectic woman in no acute distress.  Oxygen in place per nasal cannula.  Her heart is irregularly irregular and tachycardic with a 2 out of 6 systolic murmur heard best left sternal border.  Her lungs scattered rales, but improved from admission.  Trace peripheral edema.    Medications     - MEDICATION INSTRUCTIONS -       Reason anticoagulant not prescribed for atrial fibrillation       - MEDICATION INSTRUCTIONS -       ACE/ARB/ARNI NOT PRESCRIBED         apixaban ANTICOAGULANT  2.5 mg Oral BID     azithromycin  250 mg Intravenous Q24H     cefTRIAXone  2 g Intravenous Q24H     digoxin  125 mcg Oral Daily     famotidine  20 mg Oral Daily     furosemide  40 mg Oral Daily     ipratropium - albuterol 0.5 mg/2.5 mg/3 mL  3 mL Nebulization 4x Daily     levothyroxine  88 mcg Oral Daily     metoprolol succinate ER  12.5 mg Oral Once     metoprolol succinate ER  37.5 mg Oral TID     multivitamin w/minerals  1 tablet Oral Daily     potassium chloride  20 mEq Oral Daily     senna-docusate  1 tablet Oral BID    Or     senna-docusate  2 tablet Oral BID     sodium chloride (PF)  3 mL Intracatheter Q8H     vitamin B1  100 mg Oral Daily       Data   Most Recent 3 CBC's:  Recent Labs   Lab Test 02/27/20  0549 02/26/20  0547 02/25/20  0526   WBC 13.1* 14.0* 12.5*   HGB 12.1 12.5 11.7   MCV 94 93 93    333 307     Most Recent 3 BMP's:  Recent Labs   Lab Test 02/27/20  0549  02/26/20  2314 02/26/20  0547 02/25/20  0526     --  136 141   POTASSIUM 3.6 3.8 2.9* 3.5   CHLORIDE 100  --  95 104   CO2 36*  --  37* 30   BUN 25  --  29 43*   CR 0.74  --  0.86 1.13*   ANIONGAP 2*  --  4 7   KIMBERLY 9.1  --  9.1 9.2   *  --  91 66*     Most Recent 3 Troponin's:  Recent Labs   Lab Test 02/24/20  1024 02/24/20  0451 02/24/20  0114   TROPI 0.040 0.016 <0.015     Last 24 hours labs reviewed   EKG: (reviewed personally) afib     Imaging: CT chest  1.  Airspace disease in the right upper lobe, right lower lobe, and  left lower lobe. There may be cavitary lesions in the left lung base.  Recommend follow-up imaging to resolution following treatment, perhaps  in 4-6 weeks.  2.  Cardiomegaly with pleural effusions and fluid in the pericardial  Recesses.    Tele: afib with -110s    Echo:   1. The left ventricle is normal in size. The visual ejection fraction is  estimated at 25%. There is severe global hypokinesia of the left ventricle.  2. The right ventricle is moderately dilated. The right ventricular systolic  function is mild to moderately reduced.  3. There is moderately severe (3+) mitral regurgitation. 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  4. There is moderately severe (3+) tricuspid regurgitation. The right  ventricular systolic pressure is approximated at 30mmHg plus the right atrial  pressure.     Echo from 1/2019 showed EF 60%, normal RV, 1-2+ MR, 1+ TR.    Last ischemic eval: none    Device: none

## 2020-02-27 NOTE — PLAN OF CARE
BP stable, HR running as fast as 160s prior to AM meds, 120's after meds. 2L NC.  Pt declines PPM, palliative consult completed. Pt and family have opted for comfort cares. Family hope for a hospice facility such as [facility]. SW consulted to assist with planning. Turn/repo as pt will allow-declines to get out of bed, purewick, Alert, forgetful. Family very involved. Possible transfer to station 88.

## 2020-02-27 NOTE — PROGRESS NOTES
SPIRITUAL HEALTH SERVICES Progress Note  [facility]     spoke with pt and two daughters following Palliative Care Consult. Daughters were happy to have pt's wishes articulated to them and staff, but are still dealing with the new reality of comfort-focused care and transitioning to hospice. Daughters say these events been rapid and unexpected and pt's wishes seem somewhat out of character, but they respect her wishes and as of writing are grieving appropriately. Family seems anxious about getting additional care and legal needs in order, but is aware of potential resources and steps staff will take. Pt has no presenting spiritual needs and daughters seemed bothered by the lack-thereof, especially surrounding what comes after death.  offered emotional support to daughters and notified them that SH services are available to pt and them at request.  has no plans to follow at the time of writing but remains available.       Fady Blake   Intern

## 2020-02-27 NOTE — PLAN OF CARE
[id number]  Neuro- A&Ox3, disoriented to time, forgetful  Most Recent Vitals- Temp: 98.6  F (37  C) Temp src: Oral BP: 105/78 Pulse: 101 Heart Rate: 105 Resp: 15 SpO2: 94 % O2 Device: Nasal cannula Oxygen Delivery: 2 LPM  Tele/Cardiac- A Fib CVR/RVR, denies CP  Resp- continues on 3L via NC  Activity- A-1  Pain- denies  Drips- none, SL  Drains/Tubes- Purewick, PIV  Skin- Redness on coccyx, heels, and spine-blanchable. Black spots/scabs on bilateral great toes  GI/- incontinent of urine-purewick in place,   Aggression Color- Green  Plan- Palliative Consult  Misc- Pt frequently refusing repositioning and foam heel boots to protect skin    Elsie Garrett, RN

## 2020-02-27 NOTE — PROVIDER NOTIFICATION
MD Notification    Notified Person: MD    Notified Person Name: Dr. Landry    Notification Date/Time: 2/26/2020 11:59 PM    Notification Interaction: Paged    Purpose of Notification: Lactate 2.2    Orders Received:    Comments:

## 2020-02-28 NOTE — PROVIDER NOTIFICATION
MD Notification    Notified Person: MD    Notified Person Name:    Notification Date/Time: 10:25 AM on 2/28     Notification Interaction: Text-paged     Purpose of Notification: FYI Patient has placement at [facility] today, ride has been arranged for 5 pm tonight, patient will need 30 day supply of medications.     Orders Received:    Comments:

## 2020-02-28 NOTE — PROGRESS NOTES
Hospice planning to meet with family today at 1330 to discuss hospice and discharge plans. Pt going to [facility] today. Hospice recommends medications below for comfort. Care coordination with Altagracia CHOI and  Dr Chandra. Will update after meeting with pt/family.      No ranges, liquids ok, vials ok.     Atropine 1% drops; 4 gtts every 2 hours PRN for oral secretions  Acetaminophen 650mg supp; 1 supp AZ q4h PRN pain/fever  Bisacodyl 10mg sup; 1 supp AZ 1-2 times daily prn constipation  Senna 8.6mg; 1 tabs PO twice daily prn for constipation  Haloperidol 0.5mg; 1 tab (0.5mg) PO/SL/AZ prn nausea/agitation  Lorazepam 0.5mg;   tab (0.25mg) PO/SL/AZ q4h PRN anxiety/restlessness  Pain medication as appropriate    Thank you for the referral and opportunity to work with this family and team,     Love Martínez RN PHN [facility]  Hospice Referral Specialist  [facility]    [phone]  [email or web address]

## 2020-02-28 NOTE — PROGRESS NOTES
SW  I: Patient has been accepted at [facility]. Transport via [facility] stretcher ride set for 5:00 today, 2/28/20. Ambulance PCS faxed to billing and on patient chart. Writer called to [facility] regarding a time to meet with family to admit patient into hospice. Patients Ashley montenegro ([phone]) can be at [facility] at 11:00 and on to meet with hospice. Zainab at [facility] would like for patient to come with liquid O2 and a 30 day supply of hospice medications.   P: Will continue to follow.     11:00: Patients Ashley montenegro notified that  Hospice Liaison Tona Martínez ([phone]) can meet with patient and family today between 1:30 and 2:00 to admit patient into hospice. Writer notified Tona that Zainab at [facility] is requesting liquid O2 to be ordered for patient; Per Tona, O2 will be ordered and be available when patient arrives at [facility]. Hospitalist working on patients hospice medications, thirty day supply to be sent with patient. Patient and family are in agreement with this plan.     Altagracia Olson, SADE, [facility]  [phone]

## 2020-02-28 NOTE — PLAN OF CARE
Lethargic, oriented x3, forgetful. Denies pain and any need for pain medications. No SOB noted. Pt resting comfortably w/ 3 L NC. Plan to transfer to [facility] for inpatient hospice. Transportation arranged by  for 5 PM. Little appetite w/ oral intake encouraged. All medications have been sent up from discharge pharmacy and sent with patient's family to deliver to facility. AVS also printed and new medication list reviewed with family. All patient belongings have been sent with family. EMS arrived to unit to transport patient to hospice facility.

## 2020-02-28 NOTE — CONSULTS
Met with pt and family, Ashley, Irish, Jorge L, and Royer to discuss plans for hospice. Presented hospice services and philosophy. Family and pt in agreement with hospice . Pt to be discharged to [facility] today at 5pm and has agreed to have  Hospice follow with hospice services.  Equipment of liquid oxygen was ordered and being delivered to facility with expected arrival by 4pm. Medications are being filled at the discharge pharmacy and will be sent with the pt. Family verbalized understanding that completion of the admission visit is scheduled for 2/29/29. The POLST documentation was completed and copy sent with pt and copy put on chart to scan to [employer]. The Hospice consents were signed on 2/29/20 by daughter Ashley. ?Pt/Family has the 24 hour phone number for [facility] for any questions or concerns. Care coordinated with Altagracia CHOI, Radha LOVELACE, Zainab at [facility] and Efe YADAV  for hospice services.      Thank you for this referral and opportunity to work with this family and the medical team,      Love Martínez RN PHN [facility]  Hospice Referral Specialist  [phone]  [email or web address]

## 2020-02-28 NOTE — PROGRESS NOTES
BRIEF NUTRITION NOTE    Reason for note:  Lisa Damon is a 84 year old female originally seen by Registered Dietitian for positive Admission Nutrition Risk Screen --> weight loss and reduced oral intakes    Pt has transferred to comfort cares, nutrition will no longer be following. Please re-consult if a Registered Dietitian is needed in the future.       Leanna Serrano, RD, LD  Clinical Dietitian

## 2020-02-28 NOTE — PLAN OF CARE
Pt A&Ox3 but forgetful. Pt has elected comfort care and is no longer on tele, meds discontinued and is refusing most repositioning. Purewick is in place for voiding with adequate output and has no complaints of pain. Is likely to discharge today or tomorrow to hospice center or home with [facility]

## 2020-02-29 NOTE — DISCHARGE SUMMARY
[facility]    Discharge Summary  Hospitalist    Date of Admission:  2/23/2020  Date of Discharge:  2/28/2020  Discharging Provider: Efe Chandra  Date of Service (when I saw the patient): 02/28/20    Discharge Diagnoses   1. Atrial fibrillation with RVR   2. New onset acute systolic CHF exacerbation (EF 25%)  3. Community acquired pneumonia  4. Acute hypoxic respiratory failure  5. Failure to thrive  6. Severe protein calorie malnutrition  7. End of Life Discussion, transition to hospice care    History of Present Illness   Lisa Damon is a 84 year old female with a history of Raynaud's syndrome, TIA presents with increased shortness of breath and found to have an acute CHF exacerbation and Afib with RVR.  Admitted to CICU.  Unable to achieve rate control with medications alone, pt hypotensive and with severe cardiomyopathy. Does not wish for PPM or aggressive medical therapy.  Transition 2/27 to comfort care and hospice after discussion with pt and family.       For additional information, please refer to the H&P from Dr. Rodriguez on 2/23/2020.    Hospital Course     Atrial fibrillation with RVR   New onset acute systolic CHF exacerbation (EF 25%)  Community acquired pneumonia  Acute hypoxic respiratory failure  Failure to thrive  Severe protein calorie malnutrition  End of Life Discussion  Patient presents with acute onset of shortness of breath and bilateral lower extremity edema, further work-up indicated possible community-acquired pneumonia along with elevated BNP.  Her last echocardiogram was in 2019 and it did show ejection fraction at 60% age with a mild mitral regurgitation.  Further evaluation at that time with a Holter did indicate an nonsustained V. tach, she was recommended to have ischemic work-up at that time.  * Chest Xray shows extensive bilateral pulmonary disease, possibly edema, and effusions, R > L. CT chest confirms airways disease, possibly with some  bronchiectasis. Weight is still up. Not much edema remaining. HR , variable. BP marginal in 90's/50's. No specific complaints. Heart irregular, lungs with bilateral crackles.  * Patient was diagnosed with A. fib with RVR on presentation at the rate of 150 bpm, she was treated with IV metoprolol and her HR improved.  Doppler ultrasound of bilateral lower extremity was obtained due to increased to swelling which is acute and it is negative for DVT.  TSH wnl.  --Echocardiogram with new dx biventricular failure with EF 25%, likely tachycardia induced with new dx afib with rvr.  Also 3+ MR and TR.  --Cardiology consulted, input greatly appreciated.  --Recommendation for pacemaker given unable to achieve rate control with medications alone, and she already has borderline hypotension.  -------Pt states she does not want aggressive cares.  She has lost a lot of her independence over the last few years and reports an unintentional ~60 lbs weight loss.  Discussed options ranging from aggressive care with procedures, to medical management to comfort care.  Pt expressed to me and Cardiology that she doesn't want to pursue further cares, including medications. She is ok with comfort care and has been healthy most of her life until now.   --Palliative care consulted, Care conference completed with daughters at bedside, and one dtr by phone.  Please see Palliative notes.  --Pt and family opted for comfort care   --Comfort orders/meds entered.  --Discontinued cardiac medications and antibiotics per patient wishes.  No lab draws or invasive procedures.  Will keep levothyroxine and Pepcid going for comfort purposes, but can discontinue if patient doesn't want these.  --Hospice consulted, Will go to [facility] today.  Comfort medications filled and sent with patient at discharge.    Efe Chandra PA-C      Pending Results   None     Code Status   DNR / DNI       Primary Care Physician   Pb Andrews    Physical  Exam   Temp: 98.4  F (36.9  C) Temp src: Axillary     Heart Rate: 93 Resp: 22 SpO2: 90 % O2 Device: Nasal cannula Oxygen Delivery: 3 LPM  Vitals:    02/26/20 0547 02/27/20 0600 02/28/20 0500   Weight: 49 kg (108 lb 1.6 oz) 48.4 kg (106 lb 12.8 oz) 46.7 kg (103 lb)     Vital Signs with Ranges  Temp:  [98.4  F (36.9  C)] 98.4  F (36.9  C)  Heart Rate:  [93] 93  Resp:  [22] 22  SpO2:  [90 %] 90 %  I/O last 3 completed shifts:  In: 303 [I.V.:303]  Out: 2000 [Urine:2000]    Constitutional: Somnolent, oriented to self, place, situation. Cooperative, no apparent distress.  HEENT: Moist mucous membranes  Respiratory: Bilateral basilar crackles, scattered wheezes noted.  Breathing non-labored.  Cardiovascular: Tachycardic and irregular, and grade 2 murmur noted.  Skin: No rashes, no cyanosis, no edema.  Bilateral lower extremity 1-2+ pitting edema noted, toes are cyanotic looking.    Musculoskeletal: No joint swelling, erythema or tenderness.    Discharge Disposition   Admited to hospice care.   Agency: [facility].      Consultations This Hospital Stay   OCCUPATIONAL THERAPY ADULT IP CONSULT  NUTRITION SERVICES ADULT IP CONSULT  CARE COORDINATOR IP CONSULT  SOCIAL WORK IP CONSULT  CARDIOLOGY IP CONSULT  PHARMACY TO DOSE HEPARIN  CARE TRANSITION RN/SW IP CONSULT  ADVANCE DIRECTIVE IP CONSULT  PALLIATIVE CARE ADULT IP CONSULT    Time Spent on this Encounter   I, LEIF Jimenez, personally saw the patient today and spent greater than 30 minutes discharging this patient.    Discharge Orders      Activity    Your activity upon discharge: activity as tolerated     Diet    Follow this diet upon discharge:     Snacks/Supplements Adult: Boost Shake; Between Meals      Regular Diet Adult     Discharge Medications   Discharge Medication List as of 2/28/2020  4:22 PM      START taking these medications    Details   acetaminophen (TYLENOL) 325 MG tablet Take 2 tablets (650 mg) by mouth every 4 hours as needed for mild pain or  fever, Disp-30 tablet, R-1, E-Prescribe      acetaminophen (TYLENOL) 650 MG suppository Place 1 suppository (650 mg) rectally every 4 hours as needed for fever or mild pain, Disp-30 suppository, R-1, E-Prescribe      atropine 1 % ophthalmic solution Place 4 drops under the tongue every hour as needed for other (secretions), Disp-1 Bottle, R-1, E-Prescribe      bisacodyl (DULCOLAX) 10 MG suppository Place 1 suppository (10 mg) rectally daily as needed for constipation, Disp-20 suppository, R-1, E-Prescribe      famotidine (PEPCID) 20 MG tablet Take 1 tablet (20 mg) by mouth daily, Disp-30 tablet, R-1, E-Prescribe      guaiFENesin (ROBITUSSIN) 20 mg/mL SOLN solution Take 10 mLs by mouth every 4 hours as needed for other (secretion expectorant), Transitional      haloperidol (HALDOL) 2 MG/ML (HIGH CONC) solution Take 0.5 mLs (1 mg) by mouth, place under tongue or insert rectally every 6 hours as needed for agitation (nausea), Disp-30 mL, R-1, Local Print      HYDROmorphone, HIGH CONC, (DILAUDID) 10 mg/mL LIQD oral Take 0.1 mLs (1 mg) by mouth or place under tongue every 2 hours as needed for moderate to severe pain (and/or  shortness of breath), Disp-30 mL, R-0, Local Print      LORazepam (ATIVAN) 2 MG/ML (HIGH CONC) solution Take 0.125 mLs (0.25 mg) by mouth, place under tongue or insert rectally every 4 hours as needed for anxiety (restlessness), Disp-30 mL, R-1, Local Print      MEDICATION INSTRUCTION If care facility cannot accept or use ranges, facility is instructed to use lower end of dosing range, No Print Out         CONTINUE these medications which have CHANGED    Details   levothyroxine (SYNTHROID/LEVOTHROID) 88 MCG tablet Take 1 tablet (88 mcg) by mouth daily, Disp-30 tablet, R-1, E-Prescribe         STOP taking these medications       order for DME Comments:   Reason for Stopping:         trolamine salicylate (ASPERCREME) 10 % external cream Comments:   Reason for Stopping:             Allergies   No Known  Allergies  Data   Most Recent 3 CBC's:  Recent Labs   Lab Test 02/27/20  0549 02/26/20  0547 02/25/20  0526   WBC 13.1* 14.0* 12.5*   HGB 12.1 12.5 11.7   MCV 94 93 93    333 307      Most Recent 3 BMP's:  Recent Labs   Lab Test 02/27/20  0549 02/26/20  2314 02/26/20  0547 02/25/20  0526     --  136 141   POTASSIUM 3.6 3.8 2.9* 3.5   CHLORIDE 100  --  95 104   CO2 36*  --  37* 30   BUN 25  --  29 43*   CR 0.74  --  0.86 1.13*   ANIONGAP 2*  --  4 7   KIMBERLY 9.1  --  9.1 9.2   *  --  91 66*     Most Recent 2 LFT's:  Recent Labs   Lab Test 02/27/20  0549 02/23/20 2037   AST 51* 35   * 47   ALKPHOS 110 103   BILITOTAL 0.5 0.7     Most Recent INR's and Anticoagulation Dosing History:  Anticoagulation Dose History     Recent Dosing and Labs Latest Ref Rng & Units 7/23/2009 8/19/2010    INR 0.86 - 1.14 1.01 0.98        Most Recent 3 Troponin's:  Recent Labs   Lab Test 02/24/20  1024 02/24/20  0451 02/24/20  0114   TROPI 0.040 0.016 <0.015     Most Recent Cholesterol Panel:  Recent Labs   Lab Test 01/14/19  1650   CHOL 195   LDL 79      TRIG 59     Most Recent 6 Bacteria Isolates From Any Culture (See EPIC Reports for Culture Details):  Recent Labs   Lab Test 02/24/20  0640 02/24/20  0632 01/14/19  1230   CULT No growth after 4 days No growth after 4 days No Beta Streptococcus isolated     Most Recent TSH, T4 and A1c Labs:  Recent Labs   Lab Test 02/24/20  0114  01/14/19  1650 01/14/19  0909   TSH 1.79   < >  --  0.18*   T4  --   --   --  1.72*   A1C  --   --  5.4  --     < > = values in this interval not displayed.     Results for orders placed or performed during the hospital encounter of 02/23/20   US Lower Extremity Venous Duplex Bilateral    Narrative    ULTRASOUND VENOUS LOWER EXTREMITY BILATERAL 2/23/2020 9:27 PM     HISTORY: Swelling, shortness of breath.    COMPARISON: None.    TECHNIQUE: Ultrasound gray scale, Color Doppler flow, and spectral  Doppler waveform analysis  performed.    FINDINGS: The bilateral common femoral, superficial femoral, popliteal  and posterior tibial veins are patent and fully compressible and  demonstrate normal venous Doppler flow. The visualized greater  saphenous veins are negative for thrombus.      Impression    IMPRESSION: No DVT demonstrated.    OLIVIA COWAN MD   XR Chest 2 Views    Narrative    CHEST TWO VIEWS 2/23/2020 9:02 PM     HISTORY: Chest pain.    COMPARISON: None.       Impression    IMPRESSION: Suspected consolidation in the right upper lobe. Bilateral  fibrosis noted. Additional infiltrate not excluded.    OLIVIA COWAN MD   CT Chest w Contrast    Narrative    CT CHEST W CONTRAST 2/24/2020 9:42 AM    CLINICAL HISTORY: Cough, persistent  TECHNIQUE: CT chest with IV contrast. Multiplanar reformats were  obtained. Dose reduction techniques were used.    CONTRAST: 80 mL Isovue-[id number]    COMPARISON: Radiograph dated 2/23/2020    FINDINGS:   LUNGS AND PLEURA: There are right greater than left pleural effusions.  There are groundglass and consolidative opacities in the right upper  lobe, right lower lobe, and left lower lobe. There is a small region  of lucency in the left lung base which could represent traction  bronchiectasis more small cavities.    MEDIASTINUM/AXILLAE: The heart is enlarged. The esophagus is not well  assessed. Cannot assess for mediastinal or hilar lymphadenopathy due  to streak artifact and fluid in the pericardial recesses.    UPPER ABDOMEN: Circumscribed hypoattenuating hepatic foci are likely  cysts.    MUSCULOSKELETAL: Degenerative changes a present. There is height loss  of the T12 vertebral body. There is exaggerated kyphotic curvature of  the included spine.      Impression    IMPRESSION:   1.  Airspace disease in the right upper lobe, right lower lobe, and  left lower lobe. There may be cavitary lesions in the left lung base.  Recommend follow-up imaging to resolution following treatment, perhaps  in 4-6 weeks.  2.   Cardiomegaly with pleural effusions and fluid in the pericardial  recesses.    LESTER J FAHRNER, MD   Echocardiogram Complete    Narrative    [id number]  [id number]  [id number]^GREG^WANDA^PAT           [facility]  Echocardiography Laboratory  [address] [place] [id number]        Name: CUONG GARCIA  MRN: [id number]  : 1935  Study Date: 2020 02:33 PM  Age: 84 yrs  Gender: Female  Patient Location: [facility]  Reason For Study: CHF  Ordering Physician: WANDA GARZA  Referring Physician: Pb Andrews  Performed By: Unique Shanks     BSA: 1.6 m2  Height: 70 in  Weight: 114 lb  HR: 115  BP: 117/74 mmHg  _____________________________________________________________________________  __        Procedure  Complete Portable Echo Adult. Optison (NDC #8556-0258) given intravenously.  _____________________________________________________________________________  __        Interpretation Summary     1. The left ventricle is normal in size. The visual ejection fraction is  estimated at 25%. There is severe global hypokinesia of the left ventricle.  2. The right ventricle is moderately dilated. The right ventricular systolic  function is mild to moderately reduced.  3. There is moderately severe (3+) mitral regurgitation. 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  4. There is moderately severe (3+) tricuspid regurgitation. The right  ventricular systolic pressure is approximated at 30mmHg plus the right atrial  pressure.     Echo from 2019 showed EF 60%, normal RV, 1-2+ MR, 1+ TR.  _____________________________________________________________________________  __        Left Ventricle  The left ventricle is normal in size. There is normal left ventricular wall  thickness. The visual ejection fraction is estimated at 25%. Diastolic  function not assessed due to atrial fibrillation. There is severe global  hypokinesia of the left ventricle.     Right Ventricle  The right ventricle  is moderately dilated. The right ventricular systolic  function is mild to moderately reduced.     Atria  There is moderate biatrial enlargement. There is no atrial shunt seen.     Mitral Valve  There is moderately severe (3+) mitral regurgitation. 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        Tricuspid Valve  There is moderately severe (3+) tricuspid regurgitation. The right ventricular  systolic pressure is approximated at 30mmHg plus the right atrial pressure.     Aortic Valve  The aortic valve is normal in structure and function.     Pulmonic Valve  The pulmonic valve is normal in structure and function.     Vessels  Normal ascending, transverse (arch), and descending aorta. The inferior vena  cava was normal in size with preserved respiratory variability.     Pericardium  There is no pericardial effusion.        Rhythm  The rhythm was atrial fibrillation.  _____________________________________________________________________________  __  MMode/2D Measurements & Calculations  IVSd: 0.98 cm     LVIDd: 3.8 cm  LVIDs: 3.4 cm  LVPWd: 0.98 cm  FS: 9.7 %  LV mass(C)d: 113.6 grams  LV mass(C)dI: 69.1 grams/m2  Ao root diam: 3.2 cm  LA dimension: 4.3 cm  asc Aorta Diam: 3.4 cm  LA/Ao: 1.3  LA Volume (BP): 79.9 ml  LA Volume Index (BP): 48.7 ml/m2  RWT: 0.52           Doppler Measurements & Calculations  MV E max rex: 82.3 cm/sec  MV dec time: 0.15 sec  MR ERO: 0.33 cm2  MR volume: 50.0 ml  TR max rex: 273.4 cm/sec  TR max P.9 mmHg  E/E' av.3  Lateral E/e': 9.0  Medial E/e': 13.7           _____________________________________________________________________________  __           Report approved by: Gokul Koch 2020 04:28 PM

## 2020-03-03 ENCOUNTER — DOCUMENTATION ONLY (OUTPATIENT)
Dept: OTHER | Facility: CLINIC | Age: 85
End: 2020-03-03

## (undated) DEVICE — STPL SKIN 35W APPOSE 8886803712

## (undated) DEVICE — ESU ELEC NDL 1" COATED/INSULATED E1465

## (undated) DEVICE — GLOVE PROTEXIS POWDER FREE SMT 7.5  2D72PT75X

## (undated) DEVICE — LINEN TOWEL PACK X5 5464

## (undated) DEVICE — SPONGE SURGIFOAM 12 1972

## (undated) DEVICE — DRAPE STERI APERATURE 15X15" 1020

## (undated) DEVICE — SU CHROMIC 5-0 P-3 18" 687G

## (undated) DEVICE — DRAIN PENROSE 0.25"X18" LATEX FREE GR201

## (undated) DEVICE — SYR 03ML LL W/O NDL 309657

## (undated) DEVICE — SU VICRYL 5-0 PS-3 18" UND J500G

## (undated) DEVICE — BLADE KNIFE BEAVER TYMPANOPLASTY 2.5MM W/60D DOWN 377200

## (undated) DEVICE — SOL NACL 0.9% IRRIG 1000ML BOTTLE 07138-09

## (undated) DEVICE — GLOVE PROTEXIS W/NEU-THERA 7.5  2D73TE75

## (undated) DEVICE — DRSG GLASSCOCK ADULT S-1000

## (undated) DEVICE — DRAPE MICROSCOPE OPMI ZEISS 48X118" 306071-0000-000

## (undated) DEVICE — DRAPE MAYO STAND 23X54 8337

## (undated) DEVICE — PACK HEAD NECK SEN15HNFSF

## (undated) DEVICE — SUCTION CANISTER MEDIVAC LINER 3000ML W/LID 65651-530

## (undated) RX ORDER — LIDOCAINE HYDROCHLORIDE 20 MG/ML
INJECTION, SOLUTION EPIDURAL; INFILTRATION; INTRACAUDAL; PERINEURAL
Status: DISPENSED
Start: 2017-11-16

## (undated) RX ORDER — PROPOFOL 10 MG/ML
INJECTION, EMULSION INTRAVENOUS
Status: DISPENSED
Start: 2017-11-16

## (undated) RX ORDER — DEXAMETHASONE SODIUM PHOSPHATE 4 MG/ML
INJECTION, SOLUTION INTRA-ARTICULAR; INTRALESIONAL; INTRAMUSCULAR; INTRAVENOUS; SOFT TISSUE
Status: DISPENSED
Start: 2017-11-16

## (undated) RX ORDER — ONDANSETRON 2 MG/ML
INJECTION INTRAMUSCULAR; INTRAVENOUS
Status: DISPENSED
Start: 2017-11-16

## (undated) RX ORDER — CEFAZOLIN SODIUM 2 G/100ML
INJECTION, SOLUTION INTRAVENOUS
Status: DISPENSED
Start: 2017-11-16

## (undated) RX ORDER — FENTANYL CITRATE 50 UG/ML
INJECTION, SOLUTION INTRAMUSCULAR; INTRAVENOUS
Status: DISPENSED
Start: 2017-11-16